# Patient Record
Sex: FEMALE | Race: WHITE | Employment: OTHER | URBAN - METROPOLITAN AREA
[De-identification: names, ages, dates, MRNs, and addresses within clinical notes are randomized per-mention and may not be internally consistent; named-entity substitution may affect disease eponyms.]

---

## 2017-01-20 RX ORDER — METAXALONE 800 MG/1
TABLET ORAL
Qty: 180 TABLET | Refills: 1 | Status: SHIPPED | OUTPATIENT
Start: 2017-01-20 | End: 2017-08-24

## 2017-01-20 NOTE — TELEPHONE ENCOUNTER
Approve/Deny. Last OV LE ANA LILIA 7-18-16 with request for 1 year follow up with refill given for 6 months.

## 2017-03-23 PROCEDURE — 87624 HPV HI-RISK TYP POOLED RSLT: CPT | Performed by: OBSTETRICS & GYNECOLOGY

## 2017-03-23 PROCEDURE — 88175 CYTOPATH C/V AUTO FLUID REDO: CPT | Performed by: OBSTETRICS & GYNECOLOGY

## 2017-08-24 ENCOUNTER — OFFICE VISIT (OUTPATIENT)
Dept: FAMILY MEDICINE CLINIC | Facility: CLINIC | Age: 57
End: 2017-08-24

## 2017-08-24 VITALS
RESPIRATION RATE: 20 BRPM | SYSTOLIC BLOOD PRESSURE: 120 MMHG | TEMPERATURE: 100 F | DIASTOLIC BLOOD PRESSURE: 60 MMHG | HEIGHT: 66.5 IN | WEIGHT: 187 LBS | HEART RATE: 76 BPM | BODY MASS INDEX: 29.7 KG/M2

## 2017-08-24 DIAGNOSIS — M54.9 CHRONIC BACK PAIN, UNSPECIFIED BACK LOCATION, UNSPECIFIED BACK PAIN LATERALITY: ICD-10-CM

## 2017-08-24 DIAGNOSIS — G89.29 CHRONIC BACK PAIN, UNSPECIFIED BACK LOCATION, UNSPECIFIED BACK PAIN LATERALITY: ICD-10-CM

## 2017-08-24 DIAGNOSIS — R07.89 ATYPICAL CHEST PAIN: ICD-10-CM

## 2017-08-24 DIAGNOSIS — Z82.49 FAMILY HISTORY OF EARLY CAD: ICD-10-CM

## 2017-08-24 DIAGNOSIS — Z13.820 SCREENING FOR OSTEOPOROSIS: ICD-10-CM

## 2017-08-24 DIAGNOSIS — E04.1 THYROID NODULE: ICD-10-CM

## 2017-08-24 DIAGNOSIS — R94.39 ABNORMAL STRESS ECHO: ICD-10-CM

## 2017-08-24 DIAGNOSIS — Z00.00 ANNUAL PHYSICAL EXAM: Primary | ICD-10-CM

## 2017-08-24 PROCEDURE — 99396 PREV VISIT EST AGE 40-64: CPT | Performed by: FAMILY MEDICINE

## 2017-08-24 RX ORDER — PREDNISONE 10 MG/1
TABLET ORAL
COMMUNITY
Start: 2017-07-31 | End: 2017-11-28

## 2017-08-24 RX ORDER — METAXALONE 800 MG/1
TABLET ORAL
Qty: 180 TABLET | Refills: 1 | Status: SHIPPED | OUTPATIENT
Start: 2017-08-24 | End: 2018-09-27

## 2017-08-24 NOTE — PROGRESS NOTES
HPI:   Jermaine Ko is a 60year old female who presents for a complete physical exam.     Wt Readings from Last 6 Encounters:  08/24/17 : 187 lb  03/23/17 : 186 lb  12/30/16 : 175 lb  07/18/16 : 180 lb  03/21/16 : 184 lb  03/10/16 : 187 lb 8 oz will per day     Pt reports she has had some issues with her heart skipping a beat and she has heard a swish  Pt reports early family history of CAD.         Current Outpatient Prescriptions:  Estradiol 0.0375 MG/24HR Transdermal Patch Biweekly Place 1 [facility] 8/15/2005   • Allergic rhinitis    • AR (allergic rhinitis) 8/15/2005   • Asthma    • Diverticulosis 8/15/2008   • Lactose intolerance 8/15/2008   • Migraine headache 8/15/1973   • MVP (mitral valve prolapse) 8/15/2005   • Spinal stenosis     CERVICAL MRI Cousin Female       Social History:   Smoking status: Never Smoker                                                              Smokeless tobacco: Never Used                      Alcohol use: No              Occ: . : .  Children: 4 - 2 sets of twins Future  - CBC WITH DIFFERENTIAL WITH PLATELET; Future  - VITAMIN B12; Future  - VITAMIN D, 25-HYDROXY; Future  - COMP METABOLIC PANEL (14); Future  - THYROID PEROXIDASE AND THYROGLOBULIN ANTIBODIES; Future    2.  Screening for osteoporosis    - XR DEXA BONE

## 2017-11-04 ENCOUNTER — LAB ENCOUNTER (OUTPATIENT)
Dept: LAB | Facility: HOSPITAL | Age: 57
End: 2017-11-04
Attending: FAMILY MEDICINE
Payer: COMMERCIAL

## 2017-11-04 DIAGNOSIS — Z00.00 ANNUAL PHYSICAL EXAM: ICD-10-CM

## 2017-11-04 DIAGNOSIS — E04.1 THYROID NODULE: ICD-10-CM

## 2017-11-04 DIAGNOSIS — R73.09 ELEVATED GLUCOSE: ICD-10-CM

## 2017-11-04 PROCEDURE — 86800 THYROGLOBULIN ANTIBODY: CPT

## 2017-11-04 PROCEDURE — 83036 HEMOGLOBIN GLYCOSYLATED A1C: CPT

## 2017-11-04 PROCEDURE — 82306 VITAMIN D 25 HYDROXY: CPT

## 2017-11-04 PROCEDURE — 82607 VITAMIN B-12: CPT

## 2017-11-04 PROCEDURE — 84439 ASSAY OF FREE THYROXINE: CPT

## 2017-11-04 PROCEDURE — 36415 COLL VENOUS BLD VENIPUNCTURE: CPT

## 2017-11-04 PROCEDURE — 84443 ASSAY THYROID STIM HORMONE: CPT

## 2017-11-04 PROCEDURE — 86376 MICROSOMAL ANTIBODY EACH: CPT

## 2017-11-04 PROCEDURE — 80061 LIPID PANEL: CPT

## 2017-11-04 PROCEDURE — 84481 FREE ASSAY (FT-3): CPT

## 2017-11-04 PROCEDURE — 80053 COMPREHEN METABOLIC PANEL: CPT

## 2017-11-04 PROCEDURE — 85025 COMPLETE CBC W/AUTO DIFF WBC: CPT

## 2017-11-06 ENCOUNTER — PATIENT MESSAGE (OUTPATIENT)
Dept: FAMILY MEDICINE CLINIC | Facility: CLINIC | Age: 57
End: 2017-11-06

## 2017-11-06 DIAGNOSIS — R73.09 ELEVATED GLUCOSE: Primary | ICD-10-CM

## 2017-11-06 NOTE — TELEPHONE ENCOUNTER
From: Domenico Lyons  To: Rolan Campos DO  Sent: 11/6/2017 2:13 PM CST  Subject: Test Results Question    Should I be concerned with any of my abnormal test results, such especially my RDW-CD and Monocyte counts?

## 2017-11-15 ENCOUNTER — HOSPITAL ENCOUNTER (OUTPATIENT)
Dept: CV DIAGNOSTICS | Facility: HOSPITAL | Age: 57
Discharge: HOME | End: 2017-11-15
Attending: FAMILY MEDICINE
Payer: COMMERCIAL

## 2017-11-15 DIAGNOSIS — Z82.49 FAMILY HISTORY OF EARLY CAD: ICD-10-CM

## 2017-11-15 DIAGNOSIS — R07.89 ATYPICAL CHEST PAIN: ICD-10-CM

## 2017-11-15 DIAGNOSIS — R94.39 ABNORMAL STRESS ECHO: ICD-10-CM

## 2017-11-15 PROCEDURE — 93017 CV STRESS TEST TRACING ONLY: CPT | Performed by: FAMILY MEDICINE

## 2017-11-15 PROCEDURE — 93350 STRESS TTE ONLY: CPT | Performed by: FAMILY MEDICINE

## 2017-11-15 PROCEDURE — 93018 CV STRESS TEST I&R ONLY: CPT | Performed by: FAMILY MEDICINE

## 2017-11-28 ENCOUNTER — OFFICE VISIT (OUTPATIENT)
Dept: FAMILY MEDICINE CLINIC | Facility: CLINIC | Age: 57
End: 2017-11-28

## 2017-11-28 VITALS
OXYGEN SATURATION: 98 % | WEIGHT: 188 LBS | SYSTOLIC BLOOD PRESSURE: 124 MMHG | DIASTOLIC BLOOD PRESSURE: 80 MMHG | HEART RATE: 85 BPM | BODY MASS INDEX: 30.22 KG/M2 | RESPIRATION RATE: 22 BRPM | HEIGHT: 66 IN | TEMPERATURE: 99 F

## 2017-11-28 DIAGNOSIS — R73.9 HYPERGLYCEMIA: Primary | ICD-10-CM

## 2017-11-28 PROCEDURE — 82962 GLUCOSE BLOOD TEST: CPT | Performed by: FAMILY MEDICINE

## 2017-11-28 PROCEDURE — 99213 OFFICE O/P EST LOW 20 MIN: CPT | Performed by: FAMILY MEDICINE

## 2017-11-28 RX ORDER — BUDESONIDE 0.5 MG/2ML
INHALANT ORAL AS NEEDED
COMMUNITY
Start: 2017-10-21 | End: 2018-09-27 | Stop reason: ALTCHOICE

## 2017-11-28 RX ORDER — EPINEPHRINE 0.3 MG/.3ML
INJECTION SUBCUTANEOUS AS NEEDED
COMMUNITY
Start: 2017-11-12 | End: 2019-10-11 | Stop reason: ALTCHOICE

## 2017-11-28 RX ORDER — LEVALBUTEROL INHALATION SOLUTION 0.63 MG/3ML
SOLUTION RESPIRATORY (INHALATION) AS NEEDED
COMMUNITY
Start: 2017-11-17

## 2017-11-28 NOTE — PROGRESS NOTES
HPI:   Dyan Gaston is a 59year old female here for lab follow up     Glucose and cholesterol elevated. Pt has not been doing great with exercise. Diet is ok   Working too much around the holidays.      Discussed stress echo  Pt reports she has an Disp:  Rfl:    VITAMIN D OR 5000 IU Daily Disp:  Rfl:    MSM  MG daily Disp:  Rfl:    MULTI-VITAMIN OR 1 tab daily Disp:  Rfl:    VITAMIN C 500 MG OR TABS 1 TABLET DAILY Disp:  Rfl:    SELENIUM 200 MCG OR TABS 2 qd Disp:  Rfl:       Past Medical Hist Zollie Ports Son    • Psychiatric Son      ADD   • Other Zollie Ports Son    • Obesity Sister    • Hypertension Sister    • Other [OTHER] Sister    • [address] Sister    • Psychiatric Brother      ADHD    • Breast Cancer Cousin 28     P 1st cousin- dx age 28 the plan. Follow up in 9 months  or sooner if needed.

## 2018-04-11 PROBLEM — S83.231A COMPLEX TEAR OF MEDIAL MENISCUS OF RIGHT KNEE AS CURRENT INJURY, INITIAL ENCOUNTER: Status: ACTIVE | Noted: 2018-04-11

## 2018-04-25 ENCOUNTER — HOSPITAL ENCOUNTER (OUTPATIENT)
Dept: MRI IMAGING | Age: 58
Discharge: HOME | End: 2018-04-25
Attending: ORTHOPAEDIC SURGERY
Payer: COMMERCIAL

## 2018-04-25 DIAGNOSIS — G89.29 CHRONIC PAIN OF RIGHT KNEE: ICD-10-CM

## 2018-04-25 DIAGNOSIS — S83.231A COMPLEX TEAR OF MEDIAL MENISCUS OF RIGHT KNEE AS CURRENT INJURY, INITIAL ENCOUNTER: ICD-10-CM

## 2018-04-25 DIAGNOSIS — M25.561 CHRONIC PAIN OF RIGHT KNEE: ICD-10-CM

## 2018-04-25 PROCEDURE — 73721 MRI JNT OF LWR EXTRE W/O DYE: CPT | Performed by: ORTHOPAEDIC SURGERY

## 2018-05-09 PROBLEM — M17.11 PRIMARY OSTEOARTHRITIS OF RIGHT KNEE: Status: ACTIVE | Noted: 2018-05-09

## 2018-05-09 PROBLEM — S83.231A COMPLEX TEAR OF MEDIAL MENISCUS OF RIGHT KNEE AS CURRENT INJURY, INITIAL ENCOUNTER: Status: RESOLVED | Noted: 2018-04-11 | Resolved: 2018-05-09

## 2018-05-09 PROCEDURE — 88175 CYTOPATH C/V AUTO FLUID REDO: CPT | Performed by: OBSTETRICS & GYNECOLOGY

## 2018-07-20 PROCEDURE — 88305 TISSUE EXAM BY PATHOLOGIST: CPT | Performed by: OBSTETRICS & GYNECOLOGY

## 2018-09-27 ENCOUNTER — LABORATORY ENCOUNTER (OUTPATIENT)
Dept: LAB | Age: 58
End: 2018-09-27
Attending: FAMILY MEDICINE
Payer: COMMERCIAL

## 2018-09-27 ENCOUNTER — OFFICE VISIT (OUTPATIENT)
Dept: FAMILY MEDICINE CLINIC | Facility: CLINIC | Age: 58
End: 2018-09-27
Payer: COMMERCIAL

## 2018-09-27 VITALS
WEIGHT: 186 LBS | BODY MASS INDEX: 29.89 KG/M2 | DIASTOLIC BLOOD PRESSURE: 72 MMHG | RESPIRATION RATE: 16 BRPM | OXYGEN SATURATION: 99 % | TEMPERATURE: 98 F | HEIGHT: 66 IN | HEART RATE: 84 BPM | SYSTOLIC BLOOD PRESSURE: 120 MMHG

## 2018-09-27 DIAGNOSIS — M51.26 BULGING OF LUMBAR INTERVERTEBRAL DISC: ICD-10-CM

## 2018-09-27 DIAGNOSIS — Z00.00 ANNUAL PHYSICAL EXAM: ICD-10-CM

## 2018-09-27 DIAGNOSIS — R73.9 HYPERGLYCEMIA: ICD-10-CM

## 2018-09-27 DIAGNOSIS — Z12.11 COLON CANCER SCREENING: ICD-10-CM

## 2018-09-27 DIAGNOSIS — M50.20 BULGING OF CERVICAL INTERVERTEBRAL DISC: ICD-10-CM

## 2018-09-27 DIAGNOSIS — Z00.00 ANNUAL PHYSICAL EXAM: Primary | ICD-10-CM

## 2018-09-27 DIAGNOSIS — M51.24 BULGING OF THORACIC INTERVERTEBRAL DISC: ICD-10-CM

## 2018-09-27 DIAGNOSIS — M54.9 CHRONIC BACK PAIN, UNSPECIFIED BACK LOCATION, UNSPECIFIED BACK PAIN LATERALITY: ICD-10-CM

## 2018-09-27 DIAGNOSIS — R30.0 DYSURIA: ICD-10-CM

## 2018-09-27 DIAGNOSIS — Z00.00 ROUTINE GENERAL MEDICAL EXAMINATION AT A HEALTH CARE FACILITY: ICD-10-CM

## 2018-09-27 DIAGNOSIS — G89.29 CHRONIC BACK PAIN, UNSPECIFIED BACK LOCATION, UNSPECIFIED BACK PAIN LATERALITY: ICD-10-CM

## 2018-09-27 LAB
ALBUMIN SERPL-MCNC: 3.9 G/DL (ref 3.5–4.8)
ALBUMIN/GLOB SERPL: 1 {RATIO} (ref 1–2)
ALP LIVER SERPL-CCNC: 62 U/L (ref 46–118)
ALT SERPL-CCNC: 32 U/L (ref 14–54)
ANION GAP SERPL CALC-SCNC: 6 MMOL/L (ref 0–18)
AST SERPL-CCNC: 18 U/L (ref 15–41)
BASOPHILS # BLD AUTO: 0.03 X10(3) UL (ref 0–0.1)
BASOPHILS NFR BLD AUTO: 0.5 %
BILIRUB SERPL-MCNC: 0.5 MG/DL (ref 0.1–2)
BUN BLD-MCNC: 16 MG/DL (ref 8–20)
BUN/CREAT SERPL: 18.6 (ref 10–20)
CALCIUM BLD-MCNC: 8.7 MG/DL (ref 8.3–10.3)
CHLORIDE SERPL-SCNC: 108 MMOL/L (ref 101–111)
CHOLEST SMN-MCNC: 248 MG/DL (ref ?–200)
CO2 SERPL-SCNC: 27 MMOL/L (ref 22–32)
CREAT BLD-MCNC: 0.86 MG/DL (ref 0.55–1.02)
EOSINOPHIL # BLD AUTO: 0.07 X10(3) UL (ref 0–0.3)
EOSINOPHIL NFR BLD AUTO: 1.2 %
ERYTHROCYTE [DISTWIDTH] IN BLOOD BY AUTOMATED COUNT: 13.9 % (ref 11.5–16)
EST. AVERAGE GLUCOSE BLD GHB EST-MCNC: 120 MG/DL (ref 68–126)
GLOBULIN PLAS-MCNC: 4 G/DL (ref 2.5–4)
GLUCOSE BLD-MCNC: 110 MG/DL (ref 70–99)
HAV AB SERPL IA-ACNC: 1027 PG/ML (ref 193–986)
HBA1C MFR BLD HPLC: 5.8 % (ref ?–5.7)
HCT VFR BLD AUTO: 44.8 % (ref 34–50)
HDLC SERPL-MCNC: 50 MG/DL (ref 40–59)
HGB BLD-MCNC: 14.5 G/DL (ref 12–16)
IMMATURE GRANULOCYTE COUNT: 0.01 X10(3) UL (ref 0–1)
IMMATURE GRANULOCYTE RATIO %: 0.2 %
LDLC SERPL CALC-MCNC: 171 MG/DL (ref ?–100)
LYMPHOCYTES # BLD AUTO: 2.1 X10(3) UL (ref 0.9–4)
LYMPHOCYTES NFR BLD AUTO: 35 %
M PROTEIN MFR SERPL ELPH: 7.9 G/DL (ref 6.1–8.3)
MCH RBC QN AUTO: 30 PG (ref 27–33.2)
MCHC RBC AUTO-ENTMCNC: 32.4 G/DL (ref 31–37)
MCV RBC AUTO: 92.8 FL (ref 81–100)
MONOCYTES # BLD AUTO: 0.6 X10(3) UL (ref 0.1–1)
MONOCYTES NFR BLD AUTO: 10 %
NEUTROPHIL ABS PRELIM: 3.19 X10 (3) UL (ref 1.3–6.7)
NEUTROPHILS # BLD AUTO: 3.19 X10(3) UL (ref 1.3–6.7)
NEUTROPHILS NFR BLD AUTO: 53.1 %
NONHDLC SERPL-MCNC: 198 MG/DL (ref ?–130)
OSMOLALITY SERPL CALC.SUM OF ELEC: 294 MOSM/KG (ref 275–295)
PLATELET # BLD AUTO: 302 10(3)UL (ref 150–450)
POTASSIUM SERPL-SCNC: 4.2 MMOL/L (ref 3.6–5.1)
RBC # BLD AUTO: 4.83 X10(6)UL (ref 3.8–5.1)
RED CELL DISTRIBUTION WIDTH-SD: 47.7 FL (ref 35.1–46.3)
SODIUM SERPL-SCNC: 141 MMOL/L (ref 136–144)
T3FREE SERPL-MCNC: 2.49 PG/ML (ref 2.3–4.2)
T4 FREE SERPL-MCNC: 1 NG/DL (ref 0.9–1.8)
THYROGLOB SERPL-MCNC: 36 U/ML (ref ?–60)
THYROPEROXIDASE AB SERPL-ACNC: <28 U/ML (ref ?–60)
TRIGL SERPL-MCNC: 133 MG/DL (ref 30–149)
TSI SER-ACNC: 1.31 MIU/ML (ref 0.35–5.5)
VIT D+METAB SERPL-MCNC: 55.5 NG/ML (ref 30–100)
VLDLC SERPL CALC-MCNC: 27 MG/DL (ref 0–30)
WBC # BLD AUTO: 6 X10(3) UL (ref 4–13)

## 2018-09-27 PROCEDURE — 82306 VITAMIN D 25 HYDROXY: CPT

## 2018-09-27 PROCEDURE — 86376 MICROSOMAL ANTIBODY EACH: CPT

## 2018-09-27 PROCEDURE — 84481 FREE ASSAY (FT-3): CPT

## 2018-09-27 PROCEDURE — 99396 PREV VISIT EST AGE 40-64: CPT | Performed by: FAMILY MEDICINE

## 2018-09-27 PROCEDURE — 36415 COLL VENOUS BLD VENIPUNCTURE: CPT

## 2018-09-27 PROCEDURE — 80053 COMPREHEN METABOLIC PANEL: CPT

## 2018-09-27 PROCEDURE — 80061 LIPID PANEL: CPT

## 2018-09-27 PROCEDURE — 82607 VITAMIN B-12: CPT

## 2018-09-27 PROCEDURE — 85025 COMPLETE CBC W/AUTO DIFF WBC: CPT

## 2018-09-27 PROCEDURE — 83036 HEMOGLOBIN GLYCOSYLATED A1C: CPT

## 2018-09-27 PROCEDURE — 84443 ASSAY THYROID STIM HORMONE: CPT

## 2018-09-27 PROCEDURE — 84439 ASSAY OF FREE THYROXINE: CPT

## 2018-09-27 PROCEDURE — 87086 URINE CULTURE/COLONY COUNT: CPT

## 2018-09-27 PROCEDURE — 86800 THYROGLOBULIN ANTIBODY: CPT

## 2018-09-27 RX ORDER — METAXALONE 800 MG/1
TABLET ORAL
Qty: 180 TABLET | Refills: 3 | Status: SHIPPED | OUTPATIENT
Start: 2018-09-27 | End: 2020-10-03

## 2018-09-27 RX ORDER — METAXALONE 800 MG/1
TABLET ORAL
Qty: 180 TABLET | Refills: 1 | Status: SHIPPED | OUTPATIENT
Start: 2018-09-27 | End: 2018-09-27

## 2018-09-27 RX ORDER — PREDNISONE 10 MG/1
TABLET ORAL
Status: ON HOLD | COMMUNITY
Start: 2018-09-26 | End: 2019-01-17

## 2018-09-27 NOTE — PROGRESS NOTES
HPI:   Trinidad Perera is a 59year old female who presents for a complete physical exam.     Wt Readings from Last 6 Encounters:  09/27/18 : 186 lb  07/20/18 : 187 lb 12.8 oz  05/09/18 : 180 lb  04/11/18 : 180 lb  12/29/17 : 190 lb  11/28/17 : 188 lb Outpatient Medications:  predniSONE 10 MG Oral Tab  Disp:  Rfl:    Progesterone Micronized 100 MG Oral Cap Take 1 capsule (100 mg total) by mouth nightly.  Disp: 90 capsule Rfl: 4   Estradiol 0.05 MG/24HR Transdermal Patch Biweekly Place 1 patch onto the sk hyperactivity disorder) 8/15/2005   • Allergic rhinitis    • AR (allergic rhinitis) 8/15/2005   • Asthma    • Diverticulosis 8/15/2008   • Lactose intolerance 8/15/2008   • Migraine headache 8/15/1973   • MVP (mitral valve prolapse) 8/15/2005   • Primary o Wendy Brand) Sister    • Psychiatric Brother         ADHD    • Breast Cancer Cousin 28        P 1st cousin- dx age 28   • Breast Cancer Paternal Aunt         dx age- 59   • Ovarian Cancer Paternal Cousin Female       Social History:   Social History    [place] 2+ DTR right leg 1+ left leg normal sensation   EXTREMITIES: no cyanosis, clubbing or edema  NEURO: cranial nerves are intact,motor and sensory are grossly intact    ASSESSMENT AND PLAN:   Zoë Early is a 59year old female who presents for annual

## 2018-12-06 ENCOUNTER — PATIENT MESSAGE (OUTPATIENT)
Dept: FAMILY MEDICINE CLINIC | Facility: CLINIC | Age: 58
End: 2018-12-06

## 2018-12-06 DIAGNOSIS — N28.1 KIDNEY CYSTS: Primary | ICD-10-CM

## 2018-12-06 NOTE — TELEPHONE ENCOUNTER
From: Shonda Maciel  To: Joivta Adames DO  Sent: 12/6/2018 3:58 PM CST  Subject: Test Results Question    Hello. I had 3 MRI's at [facility] for [facility] ([phone]) on Monday, December 3rd.  Has Dr. Marla Romero had a chance to review the

## 2018-12-07 NOTE — TELEPHONE ENCOUNTER
Regarding: RE: Test Results Question  Contact: [phone]  ----- Message from Generic, Mychart sent at 12/7/2018  8:50 AM CST -----    Thank you for your response. Please fax the reports to [phone], as that is my private, confidential fax.   In selena

## 2018-12-08 ENCOUNTER — HOSPITAL ENCOUNTER (OUTPATIENT)
Dept: ULTRASOUND IMAGING | Age: 58
Discharge: HOME | End: 2018-12-08
Attending: FAMILY MEDICINE
Payer: COMMERCIAL

## 2018-12-08 DIAGNOSIS — N28.1 KIDNEY CYSTS: ICD-10-CM

## 2018-12-08 PROCEDURE — 76775 US EXAM ABDO BACK WALL LIM: CPT | Performed by: FAMILY MEDICINE

## 2018-12-10 DIAGNOSIS — N28.1 BILATERAL RENAL CYSTS: Primary | ICD-10-CM

## 2019-01-16 ENCOUNTER — HOSPITAL ENCOUNTER (OUTPATIENT)
Facility: HOSPITAL | Age: 59
Setting detail: OBSERVATION
Discharge: HOME | End: 2019-01-17
Attending: STUDENT IN AN ORGANIZED HEALTH CARE EDUCATION/TRAINING PROGRAM | Admitting: HOSPITALIST
Payer: COMMERCIAL

## 2019-01-16 DIAGNOSIS — R07.9 ACUTE CHEST PAIN: Primary | ICD-10-CM

## 2019-01-16 DIAGNOSIS — I10 HYPERTENSION, UNSPECIFIED TYPE: ICD-10-CM

## 2019-01-17 ENCOUNTER — APPOINTMENT (OUTPATIENT)
Dept: GENERAL RADIOLOGY | Facility: HOSPITAL | Age: 59
End: 2019-01-17
Attending: STUDENT IN AN ORGANIZED HEALTH CARE EDUCATION/TRAINING PROGRAM
Payer: COMMERCIAL

## 2019-01-17 ENCOUNTER — APPOINTMENT (OUTPATIENT)
Dept: CV DIAGNOSTICS | Facility: HOSPITAL | Age: 59
End: 2019-01-17
Attending: INTERNAL MEDICINE
Payer: COMMERCIAL

## 2019-01-17 VITALS
SYSTOLIC BLOOD PRESSURE: 159 MMHG | RESPIRATION RATE: 18 BRPM | BODY MASS INDEX: 30.86 KG/M2 | TEMPERATURE: 98 F | HEIGHT: 66 IN | DIASTOLIC BLOOD PRESSURE: 92 MMHG | HEART RATE: 77 BPM | WEIGHT: 192 LBS | OXYGEN SATURATION: 96 %

## 2019-01-17 PROBLEM — I10 HYPERTENSION, UNSPECIFIED TYPE: Status: ACTIVE | Noted: 2019-01-17

## 2019-01-17 PROBLEM — R07.9 ACUTE CHEST PAIN: Status: ACTIVE | Noted: 2019-01-17

## 2019-01-17 LAB
ALBUMIN SERPL-MCNC: 3.9 G/DL (ref 3.1–4.5)
ALBUMIN/GLOB SERPL: 1 {RATIO} (ref 1–2)
ALP LIVER SERPL-CCNC: 66 U/L (ref 46–118)
ALT SERPL-CCNC: 31 U/L (ref 14–54)
ANION GAP SERPL CALC-SCNC: 8 MMOL/L (ref 0–18)
APTT PPP: 34.6 SECONDS (ref 26.1–34.6)
AST SERPL-CCNC: 22 U/L (ref 15–41)
ATRIAL RATE: 97 BPM
BASOPHILS # BLD AUTO: 0.04 X10(3) UL (ref 0–0.1)
BASOPHILS NFR BLD AUTO: 0.5 %
BILIRUB SERPL-MCNC: 0.2 MG/DL (ref 0.1–2)
BUN BLD-MCNC: 23 MG/DL (ref 8–20)
BUN/CREAT SERPL: 25.8 (ref 10–20)
CALCIUM BLD-MCNC: 9.1 MG/DL (ref 8.3–10.3)
CHLORIDE SERPL-SCNC: 107 MMOL/L (ref 101–111)
CHOLEST SMN-MCNC: 193 MG/DL (ref ?–200)
CO2 SERPL-SCNC: 26 MMOL/L (ref 22–32)
CREAT BLD-MCNC: 0.89 MG/DL (ref 0.55–1.02)
D-DIMER: 0.45 UG/ML FEU (ref 0–0.49)
EOSINOPHIL # BLD AUTO: 0.08 X10(3) UL (ref 0–0.3)
EOSINOPHIL NFR BLD AUTO: 1 %
ERYTHROCYTE [DISTWIDTH] IN BLOOD BY AUTOMATED COUNT: 14 % (ref 11.5–16)
EST. AVERAGE GLUCOSE BLD GHB EST-MCNC: 120 MG/DL (ref 68–126)
GLOBULIN PLAS-MCNC: 3.8 G/DL (ref 2.8–4.4)
GLUCOSE BLD-MCNC: 117 MG/DL (ref 70–99)
HBA1C MFR BLD HPLC: 5.8 % (ref ?–5.7)
HCT VFR BLD AUTO: 41.2 % (ref 34–50)
HDLC SERPL-MCNC: 50 MG/DL (ref 40–59)
HGB BLD-MCNC: 13.5 G/DL (ref 12–16)
IMMATURE GRANULOCYTE COUNT: 0.02 X10(3) UL (ref 0–1)
IMMATURE GRANULOCYTE RATIO %: 0.3 %
INR BLD: 0.95 (ref 0.9–1.1)
LDLC SERPL CALC-MCNC: 128 MG/DL (ref ?–100)
LYMPHOCYTES # BLD AUTO: 3.64 X10(3) UL (ref 0.9–4)
LYMPHOCYTES NFR BLD AUTO: 47.2 %
M PROTEIN MFR SERPL ELPH: 7.7 G/DL (ref 6.4–8.2)
MCH RBC QN AUTO: 29.5 PG (ref 27–33.2)
MCHC RBC AUTO-ENTMCNC: 32.8 G/DL (ref 31–37)
MCV RBC AUTO: 90 FL (ref 81–100)
MONOCYTES # BLD AUTO: 0.79 X10(3) UL (ref 0.1–1)
MONOCYTES NFR BLD AUTO: 10.2 %
NEUTROPHIL ABS PRELIM: 3.14 X10 (3) UL (ref 1.3–6.7)
NEUTROPHILS # BLD AUTO: 3.14 X10(3) UL (ref 1.3–6.7)
NEUTROPHILS NFR BLD AUTO: 40.8 %
NONHDLC SERPL-MCNC: 143 MG/DL (ref ?–130)
OSMOLALITY SERPL CALC.SUM OF ELEC: 297 MOSM/KG (ref 275–295)
P AXIS: 22 DEGREES
P-R INTERVAL: 170 MS
PLATELET # BLD AUTO: 292 10(3)UL (ref 150–450)
POTASSIUM SERPL-SCNC: 3.7 MMOL/L (ref 3.6–5.1)
PRO-BETA NATRIURETIC PEPTIDE: 53 PG/ML (ref ?–125)
PSA SERPL DL<=0.01 NG/ML-MCNC: 13.1 SECONDS (ref 12.4–14.7)
Q-T INTERVAL: 358 MS
QRS DURATION: 98 MS
QTC CALCULATION (BEZET): 454 MS
R AXIS: -4 DEGREES
RBC # BLD AUTO: 4.58 X10(6)UL (ref 3.8–5.1)
RED CELL DISTRIBUTION WIDTH-SD: 46.2 FL (ref 35.1–46.3)
SODIUM SERPL-SCNC: 141 MMOL/L (ref 136–144)
T AXIS: 17 DEGREES
TRIGL SERPL-MCNC: 77 MG/DL (ref 30–149)
TROPONIN I SERPL-MCNC: <0.046 NG/ML (ref ?–0.05)
VENTRICULAR RATE: 97 BPM
VLDLC SERPL CALC-MCNC: 15 MG/DL (ref 0–30)
WBC # BLD AUTO: 7.7 X10(3) UL (ref 4–13)

## 2019-01-17 PROCEDURE — 71045 X-RAY EXAM CHEST 1 VIEW: CPT | Performed by: STUDENT IN AN ORGANIZED HEALTH CARE EDUCATION/TRAINING PROGRAM

## 2019-01-17 PROCEDURE — 93017 CV STRESS TEST TRACING ONLY: CPT | Performed by: INTERNAL MEDICINE

## 2019-01-17 PROCEDURE — 99220 INITIAL OBSERVATION CARE,LEVL III: CPT | Performed by: HOSPITALIST

## 2019-01-17 PROCEDURE — 78452 HT MUSCLE IMAGE SPECT MULT: CPT | Performed by: INTERNAL MEDICINE

## 2019-01-17 PROCEDURE — 93018 CV STRESS TEST I&R ONLY: CPT | Performed by: INTERNAL MEDICINE

## 2019-01-17 RX ORDER — AZELASTINE HYDROCHLORIDE, FLUTICASONE PROPIONATE 137; 50 UG/1; UG/1
SPRAY, METERED NASAL DAILY
Status: DISCONTINUED | OUTPATIENT
Start: 2019-01-17 | End: 2019-01-17 | Stop reason: SDUPTHER

## 2019-01-17 RX ORDER — METHYLPHENIDATE HYDROCHLORIDE 5 MG/1
5 TABLET ORAL 2 TIMES DAILY
Status: DISCONTINUED | OUTPATIENT
Start: 2019-01-17 | End: 2019-01-17

## 2019-01-17 RX ORDER — NITROGLYCERIN 0.4 MG/1
0.4 TABLET SUBLINGUAL ONCE
Status: COMPLETED | OUTPATIENT
Start: 2019-01-17 | End: 2019-01-17

## 2019-01-17 RX ORDER — METHYLPHENIDATE HYDROCHLORIDE 10 MG/1
10 TABLET ORAL 2 TIMES DAILY
Status: DISCONTINUED | OUTPATIENT
Start: 2019-01-17 | End: 2019-01-17

## 2019-01-17 RX ORDER — LISINOPRIL 10 MG/1
10 TABLET ORAL DAILY
Qty: 30 TABLET | Refills: 0 | Status: SHIPPED | OUTPATIENT
Start: 2019-01-17 | End: 2019-02-01

## 2019-01-17 RX ORDER — CYCLOBENZAPRINE HCL 5 MG
5 TABLET ORAL 3 TIMES DAILY PRN
Status: DISCONTINUED | OUTPATIENT
Start: 2019-01-17 | End: 2019-01-17

## 2019-01-17 RX ORDER — LISINOPRIL 10 MG/1
10 TABLET ORAL DAILY
Status: DISCONTINUED | OUTPATIENT
Start: 2019-01-17 | End: 2019-01-17

## 2019-01-17 RX ORDER — FLUTICASONE PROPIONATE 50 MCG
1 SPRAY, SUSPENSION (ML) NASAL DAILY
Status: DISCONTINUED | OUTPATIENT
Start: 2019-01-17 | End: 2019-01-17

## 2019-01-17 RX ORDER — POTASSIUM CHLORIDE 20 MEQ/1
40 TABLET, EXTENDED RELEASE ORAL ONCE
Status: COMPLETED | OUTPATIENT
Start: 2019-01-17 | End: 2019-01-17

## 2019-01-17 RX ORDER — SODIUM CHLORIDE 9 MG/ML
INJECTION, SOLUTION INTRAVENOUS CONTINUOUS
Status: DISCONTINUED | OUTPATIENT
Start: 2019-01-17 | End: 2019-01-17

## 2019-01-17 RX ORDER — ACETAMINOPHEN 500 MG
1000 TABLET ORAL ONCE
Status: COMPLETED | OUTPATIENT
Start: 2019-01-17 | End: 2019-01-17

## 2019-01-17 RX ORDER — LEVOTHYROXINE SODIUM 0.07 MG/1
75 TABLET ORAL
Status: DISCONTINUED | OUTPATIENT
Start: 2019-01-17 | End: 2019-01-17

## 2019-01-17 RX ORDER — ASPIRIN 325 MG
325 TABLET ORAL DAILY
Status: DISCONTINUED | OUTPATIENT
Start: 2019-01-17 | End: 2019-01-17

## 2019-01-17 RX ORDER — ALBUTEROL SULFATE 90 UG/1
2 AEROSOL, METERED RESPIRATORY (INHALATION) EVERY 4 HOURS PRN
Status: DISCONTINUED | OUTPATIENT
Start: 2019-01-17 | End: 2019-01-17

## 2019-01-17 RX ORDER — AZELASTINE 1 MG/ML
1 SPRAY, METERED NASAL DAILY
Status: DISCONTINUED | OUTPATIENT
Start: 2019-01-17 | End: 2019-01-17

## 2019-01-17 RX ORDER — ASPIRIN 81 MG/1
162 TABLET, CHEWABLE ORAL ONCE
Status: COMPLETED | OUTPATIENT
Start: 2019-01-17 | End: 2019-01-17

## 2019-01-17 RX ORDER — NITROGLYCERIN 0.4 MG/1
0.4 TABLET SUBLINGUAL EVERY 5 MIN PRN
Status: DISCONTINUED | OUTPATIENT
Start: 2019-01-17 | End: 2019-01-17

## 2019-01-17 NOTE — ED INITIAL ASSESSMENT (HPI)
Pt with intermittent chest pain aprox 45 min prior. Pt with pain to right arm and into right shoulder blade.

## 2019-01-17 NOTE — PROGRESS NOTES
Tele & one iv site removed (catheter inatact). Pt is agreeable to d/c home w/lisinopril Rx for elevated b/p; however, she stated that she will only take it tomorrow if her b/p is still elevated.   Discharge paperwork, follow-up appointments needed, dischar

## 2019-01-17 NOTE — PLAN OF CARE
NURSING ADMISSION NOTE    Assumed care of patient on CTU8 @ 0130. Patient is ANOx4, on RA, tele NSR/ST, cont B/B, UAL. Patient came to ED overnight due to dull chest pain rated 2/10 with radiation to L Arm, L Shoulder, and L side of Jaw.  Patient drove Patient/Family Goals    • Patient/Family Long Term Goal Progressing    • Patient/Family Short Term Goal Progressing

## 2019-01-17 NOTE — PROGRESS NOTES
CARDIODIAGNOTIC PRELIMINARY REPORT:      REN protocol completed for 9:15 minutes with EKG changes noted; no arrhythmias noted     Denied cardiac symptoms     Base:  134/84, HR: 88;  Peak: 184/86, HR: 141 (88% APMHR)    Second set of images pending

## 2019-01-17 NOTE — H&P
[facility] HOSPITAL[facility]  History and Physical     Matt Whitley Patient Status:  Emergency    10/13/1960 MRN [id number]   Location [facility] Attending Marialuisa Garcia MD   Hosp Day # 0 PCP Tricia Goldstein DO     Chief Compla hemorrhoids   • COLONOSCOPY  2009   • HYSTEROSCOPY  07/20/2018    S&N, R/S of Endometrial Polyp   • OTHER SURGICAL HISTORY  2010    BMT   • OTHER SURGICAL HISTORY  1993    ECTOPIC  PREGNANCY REMOVAL   • OTHER SURGICAL HISTORY  2010    ENDOSCOPIC  SINUS   • Medications on File Prior to Encounter:  Metaxalone 800 MG Oral Tab TAKE ONE TABLET TWO TIMES A DAY AS NEEDED Disp: 180 tablet Rfl: 3   Progesterone Micronized 100 MG Oral Cap Take 1 capsule (100 mg total) by mouth nightly.  Disp: 90 capsule Rfl: 4   Estrad systems was completed. Pertinent positives and negatives noted in the HPI.     Physical Exam:    BP (!) 156/93   Pulse 85   Temp 97.3 °F (36.3 °C) (Temporal)   Resp 13   Ht 167.6 cm (5' 6\")   Wt 180 lb (81.6 kg)   LMP 10/13/2007   SpO2 94%   BMI 29.05 k evaluation in the morning  2.hx of ADHD    Quality:  · DVT Prophylaxis: scd  · CODE status: full  · Gallegos: no    Plan of care discussed with patient and ED physician    Tierney Doherty MD  1/17/2019

## 2019-01-17 NOTE — ED PROVIDER NOTES
Patient Seen in: [facility] Emergency Department    History   Patient presents with:  Chest Pain Angina (cardiovascular)    Stated Complaint: chest pain    HPI    Patient is a 25-year-old female with previous history of mitral valve prolapse and bor Alcohol use: Yes      Alcohol/week: 0.0 oz      Comment: Occ glass of wine 1x/mo    Drug use: No      Comment: MARIJUANA 30 YRS. AGO W/O ABUSE/XS. Review of Systems    Positive for stated complaint: chest pain  Other systems are as noted in HPI.   Cons Final result                 Please view results for these tests on the individual orders.    COMP METABOLIC PANEL (14)   TROPONIN I   D-DIMER   PRO BETA NATRIURETIC PEPTIDE   PROTHROMBIN TIME (PT)   PTT, ACTIVATED   RAINBOW DRAW B

## 2019-01-17 NOTE — CONSULTS
[facility] Cardiology Consultation    Heatherana Núñez Patient Status:  Observation    10/13/1960 MRN [id number]   [facility] 8NE-A Attending Gisela Eisenberg MD   Hosp Day # 0 PCP Yun Sam DO     Reason for Consultation:  Chest pain Age of Onset   • Cancer Mother         NHL and pancreatic cancer   • Hypertension Mother    • Obesity Mother    • Other (Other) Mother    • Cancer Other         colon cancer in maternal aunt and maternal 1st cousin   • Heart Disorder Father         1)ANA (84.4 kg)  07/20/18 : 187 lb 12.8 oz (85.2 kg)       01/17/19  0143 01/17/19  0144 01/17/19  0145 01/17/19  0457   BP: 146/84 142/86 131/84 125/84   BP Location: Left arm Left arm Left arm Left arm   Pulse: 82 78 85 85   Resp: 18 18 18 16   Temp: 97.8 °F (

## 2019-01-17 NOTE — PLAN OF CARE
Comments: Pt is A&OX4, VSS on RA, and maintaining NSR on telemetry. Awaiting nuclear stress test results; if WNL, then pt will d/c home & f/u w/Dr. Marissa LUO.       Currently, denies any generalized/localized pain, chest pain, chest pressure, palpit replacement as ordered  - Monitor response to electrolyte replacements, including rhythm and repeat lab results as appropriate  - Fluid restriction as ordered  - Instruct patient on fluid and nutrition restrictions as appropriate  Outcome: Progressing    P

## 2019-01-18 ENCOUNTER — PATIENT OUTREACH (OUTPATIENT)
Dept: CASE MANAGEMENT | Age: 59
End: 2019-01-18

## 2019-01-18 DIAGNOSIS — Z02.9 ENCOUNTERS FOR UNSPECIFIED ADMINISTRATIVE PURPOSE: ICD-10-CM

## 2019-01-18 NOTE — DISCHARGE SUMMARY
[facility]OSPITAL[facility]  DISCHARGE SUMMARY     China Strange Patient Status:  Observation    10/13/1960 MRN [id number]   [facility] 8NE-A Attending No att. providers found   Hosp Day # 0 PCP Skyler Layton DO     Date of Admission:  Medications      START taking these medications      Instructions Prescription details   lisinopril 10 MG Tabs  Commonly known as:  ELMAILZESTRIL  Notes to patient:  Take your blood pressure at least twice per day.   Write down day/time for each [employer] Take 1,000 mg by mouth 2 (two) times daily. Refills:  0     Progesterone Micronized 100 MG Caps  Commonly known as:  PROMETRIUM      Take 1 capsule (100 mg total) by mouth nightly.    Quantity:  90 capsule  Refills:  4     PULMICORT FLEXHALER 180 MCG/A

## 2019-01-21 NOTE — PROGRESS NOTES
Initial Post Discharge Follow Up   Discharge Date: 1/17/19  Contact Date: 1/18/2019    Consent Verification:  Assessment Completed With: Patient  HIPAA Verified?   Yes    Discharge Dx:     Chest discomfort  Anxiety  Hx ADHD    General:   • How have you b Biweekly Place 1 patch onto the skin twice a week. Disp: 24 patch Rfl: 4   EPINEPHrine 0.3 MG/0.3ML Injection Solution Auto-injector as needed. Disp:  Rfl:    Levalbuterol HCl 0.63 MG/3ML Inhalation Nebu Soln as needed.    Disp:  Rfl:    Beta Glucan Does over your medications with you to make sure we are not missing anything? Pt taking medications as prescribed. Pt to reconcile medications at her [facility] HFU appt. • Are there any reasons that keep you from taking your medication as prescribed?  No  Are you hav [address] [facility]  [place] [address] [phone] [facility] TRISTAN rPieto at [address], [address]  [place] [address] [phone] [facility] - Donna Lund at [address]

## 2019-02-01 ENCOUNTER — OFFICE VISIT (OUTPATIENT)
Dept: FAMILY MEDICINE CLINIC | Facility: CLINIC | Age: 59
End: 2019-02-01
Payer: COMMERCIAL

## 2019-02-01 ENCOUNTER — LAB ENCOUNTER (OUTPATIENT)
Dept: LAB | Age: 59
End: 2019-02-01
Attending: FAMILY MEDICINE
Payer: COMMERCIAL

## 2019-02-01 VITALS
DIASTOLIC BLOOD PRESSURE: 64 MMHG | RESPIRATION RATE: 16 BRPM | HEART RATE: 87 BPM | BODY MASS INDEX: 29.89 KG/M2 | WEIGHT: 186 LBS | SYSTOLIC BLOOD PRESSURE: 120 MMHG | TEMPERATURE: 98 F | HEIGHT: 66 IN | OXYGEN SATURATION: 98 %

## 2019-02-01 DIAGNOSIS — E87.6 LOW BLOOD POTASSIUM: ICD-10-CM

## 2019-02-01 DIAGNOSIS — E06.9 THYROIDITIS: ICD-10-CM

## 2019-02-01 DIAGNOSIS — R07.89 ATYPICAL CHEST PAIN: Primary | ICD-10-CM

## 2019-02-01 DIAGNOSIS — R07.89 ATYPICAL CHEST PAIN: ICD-10-CM

## 2019-02-01 LAB
ALBUMIN SERPL-MCNC: 3.7 G/DL (ref 3.1–4.5)
ALBUMIN/GLOB SERPL: 1 {RATIO} (ref 1–2)
ALP LIVER SERPL-CCNC: 75 U/L (ref 46–118)
ALT SERPL-CCNC: 65 U/L (ref 14–54)
ANION GAP SERPL CALC-SCNC: 6 MMOL/L (ref 0–18)
AST SERPL-CCNC: 30 U/L (ref 15–41)
BILIRUB SERPL-MCNC: 0.2 MG/DL (ref 0.1–2)
BUN BLD-MCNC: 20 MG/DL (ref 8–20)
BUN/CREAT SERPL: 24.1 (ref 10–20)
CALCIUM BLD-MCNC: 8.3 MG/DL (ref 8.3–10.3)
CHLORIDE SERPL-SCNC: 108 MMOL/L (ref 101–111)
CO2 SERPL-SCNC: 28 MMOL/L (ref 22–32)
CREAT BLD-MCNC: 0.83 MG/DL (ref 0.55–1.02)
GLOBULIN PLAS-MCNC: 3.6 G/DL (ref 2.8–4.4)
GLUCOSE BLD-MCNC: 101 MG/DL (ref 70–99)
HAV IGM SER QL: 2.2 MG/DL (ref 1.8–2.5)
M PROTEIN MFR SERPL ELPH: 7.3 G/DL (ref 6.4–8.2)
OSMOLALITY SERPL CALC.SUM OF ELEC: 297 MOSM/KG (ref 275–295)
POTASSIUM SERPL-SCNC: 4 MMOL/L (ref 3.6–5.1)
SODIUM SERPL-SCNC: 142 MMOL/L (ref 136–144)
T3FREE SERPL-MCNC: 2.29 PG/ML (ref 2.3–4.2)
T4 FREE SERPL-MCNC: 1 NG/DL (ref 0.9–1.8)
THYROGLOB SERPL-MCNC: <15 U/ML (ref ?–60)
THYROPEROXIDASE AB SERPL-ACNC: 28 U/ML (ref ?–60)
TSI SER-ACNC: 0.63 MIU/ML (ref 0.35–5.5)

## 2019-02-01 PROCEDURE — 84443 ASSAY THYROID STIM HORMONE: CPT

## 2019-02-01 PROCEDURE — 80053 COMPREHEN METABOLIC PANEL: CPT

## 2019-02-01 PROCEDURE — 1111F DSCHRG MED/CURRENT MED MERGE: CPT | Performed by: FAMILY MEDICINE

## 2019-02-01 PROCEDURE — 86376 MICROSOMAL ANTIBODY EACH: CPT

## 2019-02-01 PROCEDURE — 83735 ASSAY OF MAGNESIUM: CPT

## 2019-02-01 PROCEDURE — 84481 FREE ASSAY (FT-3): CPT

## 2019-02-01 PROCEDURE — 99495 TRANSJ CARE MGMT MOD F2F 14D: CPT | Performed by: FAMILY MEDICINE

## 2019-02-01 PROCEDURE — 86800 THYROGLOBULIN ANTIBODY: CPT

## 2019-02-01 PROCEDURE — 84439 ASSAY OF FREE THYROXINE: CPT

## 2019-02-01 NOTE — PROGRESS NOTES
HPI:    Quentin Campuzano is a 59year old female here today for hospital follow up.    She was discharged from Inpatient hospital, [facility] to Home   Admit Date: 1/16/19   Discharge Date: 1/17/19  Hospital Discharge Diagnosis:    Chest pain ; lo 0.05 MG/24HR Transdermal Patch Biweekly Place 1 patch onto the skin twice a week. EPINEPHrine 0.3 MG/0.3ML Injection Solution Auto-injector as needed. Levalbuterol HCl 0.63 MG/3ML Inhalation Nebu Soln as needed.      Beta Glucan Does not apply Powder (); colonoscopy (); colonoscopy ();  ([id number]); tonsillectomy; other surgical history (); other surgical history (); other surgical history (); appendectomy (); other surgical history (); and hysteroscopy ( Resp 16   Ht 66\"   Wt 186 lb   LMP 10/13/2007   SpO2 98%   BMI 30.02 kg/m²    GENERAL: well developed, well nourished, in no apparent distress  SKIN: no rashes, no suspicious lesions  HEENT: atraumatic, normocephalic, ears and throat are clear  EYES: PER to 30 days:   · Number of Possible Diagnoses and/or Management Options: moderate  · Amount and/or Complexity of Data to Be Reviewed: moderate  · Risk of Significant Complications, Morbidity, and/or Mortality: moderate    Overall Risk:   moderate    Patient

## 2019-02-03 ENCOUNTER — PATIENT MESSAGE (OUTPATIENT)
Dept: FAMILY MEDICINE CLINIC | Facility: CLINIC | Age: 59
End: 2019-02-03

## 2019-02-04 NOTE — TELEPHONE ENCOUNTER
From: Joshua Sebastian  To: Dilia Garvey DO  Sent: 2/3/2019 3:26 PM CST  Subject: Test Results Question    I was on the cardiac floor on 1/17/19, and Dr. Tony Hardin discharged me based on the results of my Nuclear Medicine Exercise Stress test. The

## 2019-03-27 ENCOUNTER — LAB ENCOUNTER (OUTPATIENT)
Dept: LAB | Age: 59
End: 2019-03-27
Attending: INTERNAL MEDICINE
Payer: COMMERCIAL

## 2019-03-27 DIAGNOSIS — I51.9 MYXEDEMA HEART DISEASE: Primary | ICD-10-CM

## 2019-03-27 DIAGNOSIS — E03.9 MYXEDEMA HEART DISEASE: Primary | ICD-10-CM

## 2019-03-27 LAB
T3FREE SERPL-MCNC: 2.47 PG/ML (ref 2.4–4.2)
T4 FREE SERPL-MCNC: 1 NG/DL (ref 0.8–1.7)
THYROGLOB SERPL-MCNC: <15 U/ML (ref ?–60)
THYROPEROXIDASE AB SERPL-ACNC: <28 U/ML (ref ?–60)
TSI SER-ACNC: 0.6 MIU/ML (ref 0.36–3.74)

## 2019-03-27 PROCEDURE — 84443 ASSAY THYROID STIM HORMONE: CPT

## 2019-03-27 PROCEDURE — 86376 MICROSOMAL ANTIBODY EACH: CPT

## 2019-03-27 PROCEDURE — 84481 FREE ASSAY (FT-3): CPT

## 2019-03-27 PROCEDURE — 86800 THYROGLOBULIN ANTIBODY: CPT

## 2019-03-27 PROCEDURE — 84439 ASSAY OF FREE THYROXINE: CPT

## 2019-05-11 ENCOUNTER — HOSPITAL ENCOUNTER (OUTPATIENT)
Dept: GENERAL RADIOLOGY | Facility: HOSPITAL | Age: 59
Discharge: HOME | End: 2019-05-11
Attending: INTERNAL MEDICINE
Payer: COMMERCIAL

## 2019-05-11 DIAGNOSIS — R10.13 ABDOMINAL PAIN, EPIGASTRIC: ICD-10-CM

## 2019-05-11 PROCEDURE — 74240 X-RAY XM UPR GI TRC 1CNTRST: CPT | Performed by: INTERNAL MEDICINE

## 2019-05-13 NOTE — PROGRESS NOTES
Here are the results from your recent testing : The upper GI xray was normal other than a small hiatal hernia. If you need any further assistance, please feel free to call [phone]. Thank you for letting us care for you .     Sincerely ,     Lexi Steele

## 2019-05-16 PROCEDURE — 87338 HPYLORI STOOL AG IA: CPT | Performed by: INTERNAL MEDICINE

## 2019-06-10 ENCOUNTER — OFFICE VISIT (OUTPATIENT)
Dept: FAMILY MEDICINE CLINIC | Facility: CLINIC | Age: 59
End: 2019-06-10
Payer: COMMERCIAL

## 2019-06-10 VITALS
DIASTOLIC BLOOD PRESSURE: 76 MMHG | SYSTOLIC BLOOD PRESSURE: 130 MMHG | RESPIRATION RATE: 18 BRPM | OXYGEN SATURATION: 98 % | BODY MASS INDEX: 29.86 KG/M2 | WEIGHT: 188 LBS | HEART RATE: 81 BPM | HEIGHT: 66.5 IN

## 2019-06-10 DIAGNOSIS — K44.9 HIATAL HERNIA: ICD-10-CM

## 2019-06-10 DIAGNOSIS — K43.9 VENTRAL HERNIA WITHOUT OBSTRUCTION OR GANGRENE: Primary | ICD-10-CM

## 2019-06-10 PROCEDURE — 99213 OFFICE O/P EST LOW 20 MIN: CPT | Performed by: FAMILY MEDICINE

## 2019-06-10 RX ORDER — OMEPRAZOLE 40 MG/1
40 CAPSULE, DELAYED RELEASE ORAL DAILY
Qty: 30 CAPSULE | Refills: 2 | Status: SHIPPED | OUTPATIENT
Start: 2019-06-10 | End: 2019-10-11 | Stop reason: ALTCHOICE

## 2019-06-10 NOTE — PROGRESS NOTES
HPI:    Key Rhodes is a 59year old female here to follow up on multiple concerns     No further chest pain  Pt continued to have palpitations that were determined to be PVC's     HOLTER  Summary:    1. Left ventricle:  The cavity size is normal. Beta Glucan Does not apply Powder Jason's Beta 1,3 Glucan 500 mg capsules Sig: Pt to take 1 capsule QD Dispense #90 with 3 refills   Magnesium Gluconate 250 MG Oral Tab Take 750 mg by mouth.    omega-3 fatty acids (FISH OIL) 1000 MG Oral Cap Take 1,000 mg b history (2013); hysteroscopy (07/20/2018); and colonoscopy (N/A, 3/22/2019). She family history includes Breast Cancer in her paternal aunt; Breast Cancer (age of onset: 28) in her cousin;  Cancer in her mother and another family member; HYPOTHYROID in h JVD  CHEST: no chest tenderness  LUNGS: clear to auscultation  CARDIO: RRR without murmur  GI: good BS's, no masses when supine with abdominal wall flexion, + mid upper abdominal prominence when standing, no pain   MUSCULOSKELETAL: back is not tender, FROM

## 2019-06-12 ENCOUNTER — PATIENT MESSAGE (OUTPATIENT)
Dept: FAMILY MEDICINE CLINIC | Facility: CLINIC | Age: 59
End: 2019-06-12

## 2019-06-12 NOTE — TELEPHONE ENCOUNTER
From: Sorin Gutiérrez  To: Gretchen Proctor DO  Sent: 6/12/2019 10:29 AM CDT  Subject: Test Results Question    Will you please provide me with your fax number to which I can fax an MRI report that I promised to Dr. Luz Elena Chisholm?   Thank you,  Corby Elliott

## 2019-10-03 ENCOUNTER — PATIENT MESSAGE (OUTPATIENT)
Dept: FAMILY MEDICINE CLINIC | Facility: CLINIC | Age: 59
End: 2019-10-03

## 2019-10-03 DIAGNOSIS — E06.9 THYROIDITIS: ICD-10-CM

## 2019-10-03 DIAGNOSIS — E55.9 VITAMIN D DEFICIENCY: ICD-10-CM

## 2019-10-03 DIAGNOSIS — R73.9 HYPERGLYCEMIA: ICD-10-CM

## 2019-10-03 DIAGNOSIS — Z00.00 ROUTINE GENERAL MEDICAL EXAMINATION AT A HEALTH CARE FACILITY: Primary | ICD-10-CM

## 2019-10-03 NOTE — TELEPHONE ENCOUNTER
From: Scott Lewis  To: Marsha Thomas DO  Sent: 10/3/2019 8:26 AM CDT  Subject: Visit Follow-up Question    Good morning. I have my physical with Dr. Bhupinder Malone scheduled for October 11th.  I would like to be able to discuss lab work at my appointment,

## 2019-10-04 ENCOUNTER — APPOINTMENT (OUTPATIENT)
Dept: LAB | Age: 59
End: 2019-10-04
Attending: FAMILY MEDICINE
Payer: COMMERCIAL

## 2019-10-04 DIAGNOSIS — R74.8 ELEVATED LIVER ENZYMES: ICD-10-CM

## 2019-10-04 PROCEDURE — 36415 COLL VENOUS BLD VENIPUNCTURE: CPT | Performed by: FAMILY MEDICINE

## 2019-10-04 PROCEDURE — 84439 ASSAY OF FREE THYROXINE: CPT | Performed by: FAMILY MEDICINE

## 2019-10-04 PROCEDURE — 85025 COMPLETE CBC W/AUTO DIFF WBC: CPT | Performed by: FAMILY MEDICINE

## 2019-10-04 PROCEDURE — 86800 THYROGLOBULIN ANTIBODY: CPT | Performed by: FAMILY MEDICINE

## 2019-10-04 PROCEDURE — 80061 LIPID PANEL: CPT | Performed by: FAMILY MEDICINE

## 2019-10-04 PROCEDURE — 82306 VITAMIN D 25 HYDROXY: CPT | Performed by: FAMILY MEDICINE

## 2019-10-04 PROCEDURE — 80053 COMPREHEN METABOLIC PANEL: CPT

## 2019-10-04 PROCEDURE — 82607 VITAMIN B-12: CPT | Performed by: FAMILY MEDICINE

## 2019-10-04 PROCEDURE — 84481 FREE ASSAY (FT-3): CPT | Performed by: FAMILY MEDICINE

## 2019-10-04 PROCEDURE — 84443 ASSAY THYROID STIM HORMONE: CPT | Performed by: FAMILY MEDICINE

## 2019-10-04 PROCEDURE — 86376 MICROSOMAL ANTIBODY EACH: CPT | Performed by: FAMILY MEDICINE

## 2019-10-11 ENCOUNTER — OFFICE VISIT (OUTPATIENT)
Dept: FAMILY MEDICINE CLINIC | Facility: CLINIC | Age: 59
End: 2019-10-11
Payer: COMMERCIAL

## 2019-10-11 VITALS
BODY MASS INDEX: 26.52 KG/M2 | TEMPERATURE: 98 F | DIASTOLIC BLOOD PRESSURE: 72 MMHG | SYSTOLIC BLOOD PRESSURE: 124 MMHG | WEIGHT: 167 LBS | OXYGEN SATURATION: 99 % | HEART RATE: 88 BPM | HEIGHT: 66.5 IN | RESPIRATION RATE: 16 BRPM

## 2019-10-11 DIAGNOSIS — M51.24 BULGING OF THORACIC INTERVERTEBRAL DISC: ICD-10-CM

## 2019-10-11 DIAGNOSIS — Z00.00 ANNUAL PHYSICAL EXAM: Primary | ICD-10-CM

## 2019-10-11 DIAGNOSIS — M51.26 BULGING OF LUMBAR INTERVERTEBRAL DISC: ICD-10-CM

## 2019-10-11 DIAGNOSIS — M54.40 CHRONIC LOW BACK PAIN WITH SCIATICA, SCIATICA LATERALITY UNSPECIFIED, UNSPECIFIED BACK PAIN LATERALITY: ICD-10-CM

## 2019-10-11 DIAGNOSIS — M48.00 SPINAL STENOSIS, UNSPECIFIED SPINAL REGION: ICD-10-CM

## 2019-10-11 DIAGNOSIS — M50.20 BULGING OF CERVICAL INTERVERTEBRAL DISC: ICD-10-CM

## 2019-10-11 DIAGNOSIS — G89.29 CHRONIC LOW BACK PAIN WITH SCIATICA, SCIATICA LATERALITY UNSPECIFIED, UNSPECIFIED BACK PAIN LATERALITY: ICD-10-CM

## 2019-10-11 PROBLEM — R73.9 HYPERGLYCEMIA: Status: RESOLVED | Noted: 2017-11-28 | Resolved: 2019-10-11

## 2019-10-11 PROBLEM — R07.9 ACUTE CHEST PAIN: Status: RESOLVED | Noted: 2019-01-17 | Resolved: 2019-10-11

## 2019-10-11 PROCEDURE — 99396 PREV VISIT EST AGE 40-64: CPT | Performed by: FAMILY MEDICINE

## 2019-10-11 PROCEDURE — 90471 IMMUNIZATION ADMIN: CPT | Performed by: FAMILY MEDICINE

## 2019-10-11 PROCEDURE — 90686 IIV4 VACC NO PRSV 0.5 ML IM: CPT | Performed by: FAMILY MEDICINE

## 2019-10-11 NOTE — PROGRESS NOTES
HPI:   Wilmer Arreola is a 59year old female who presents for a complete physical exam.     Wt Readings from Last 6 Encounters:  10/11/19 : 167 lb (75.8 kg)  06/12/19 : 184 lb (83.5 kg)  06/10/19 : 188 lb (85.3 kg)  05/01/19 : 190 lb (86.2 kg)  04/24 for occasional flares  Pt goes to chiro every 2 weeks and pt has an inversion table   Pt in PT as well        Current Outpatient Medications:  Estradiol 0.05 MG/24HR Transdermal Patch Biweekly Place 1 patch onto the skin twice a week.  Disp: 24 patch Rfl: 4 disorder) 8/15/2005   • AR (allergic rhinitis) 8/15/2005   • Asthma    • Diverticulosis 8/15/2008   • DJD (degenerative joint disease)    • Hashimoto's thyroiditis    • Herniated disc, cervical    • Lactose intolerance 8/15/2008   • Migraine headache 8/15/ dx age- 57   • Ovarian Cancer Paternal Cousin Female    • Other (Pancreatic Cancer) Maternal Aunt         stage 4 metastatic      Social History:   Social History    Tobacco Use      Smoking status: Never Smoker      Smokeless tobacco: Never Used intact    ASSESSMENT AND PLAN:   Ayaan Daniels is a 59year old female who presents for annual physical     1. Annual physical exam      2. Bulging of cervical intervertebral disc  Note for travel     3.  Bulging of thoracic intervertebral disc  Note

## 2020-02-12 ENCOUNTER — OFFICE VISIT (OUTPATIENT)
Dept: FAMILY MEDICINE CLINIC | Facility: CLINIC | Age: 60
End: 2020-02-12
Payer: COMMERCIAL

## 2020-02-12 VITALS
RESPIRATION RATE: 16 BRPM | OXYGEN SATURATION: 98 % | SYSTOLIC BLOOD PRESSURE: 136 MMHG | WEIGHT: 167 LBS | BODY MASS INDEX: 27.49 KG/M2 | HEIGHT: 65.5 IN | DIASTOLIC BLOOD PRESSURE: 84 MMHG | TEMPERATURE: 99 F | HEART RATE: 77 BPM

## 2020-02-12 DIAGNOSIS — B02.22 TRIGEMINAL HERPES ZOSTER: Primary | ICD-10-CM

## 2020-02-12 PROCEDURE — 99214 OFFICE O/P EST MOD 30 MIN: CPT | Performed by: FAMILY MEDICINE

## 2020-02-12 RX ORDER — PREDNISONE 20 MG/1
60 TABLET ORAL DAILY
Qty: 15 TABLET | Refills: 0 | Status: SHIPPED | OUTPATIENT
Start: 2020-02-12 | End: 2020-02-17

## 2020-02-12 RX ORDER — ACYCLOVIR 800 MG/1
800 TABLET ORAL
Qty: 35 TABLET | Refills: 0 | Status: SHIPPED | OUTPATIENT
Start: 2020-02-12 | End: 2020-02-20

## 2020-02-12 NOTE — PROGRESS NOTES
HPI:   Key Rhodes is a 62year old female who presents with rash     Pt woke up with red rash to right lateral eye yesterday   It was oozing and crusted   Pt reports she now has pressure type pain radiation to the head   No vision changes  Stress hyperactivity disorder) 8/15/2005   • AR (allergic rhinitis) 8/15/2005   • Asthma    • BRCA1 negative    • BRCA2 negative    • Diverticulosis 8/15/2008   • DJD (degenerative joint disease)    • Hashimoto's thyroiditis    • Herniated disc, cervical    • Lac 1st cousin- dx age 28   • Breast Cancer Paternal Aunt         dx age- 59   • Ovarian Cancer Paternal Cousin Female    • Other (Pancreatic Cancer) Maternal Aunt         stage 4 metastatic      Social History:   Social History    Tobacco Use      Smoking sta OPHTHALMOLOGY - INTERNAL  - acyclovir 800 MG Oral Tab; Take 1 tablet (800 mg total) by mouth 5 (five) times daily for 7 days. Dispense: 35 tablet; Refill: 0     Questions answered and patient indicates understanding of these issues and agrees to the plan.

## 2020-03-06 ENCOUNTER — OFFICE VISIT (OUTPATIENT)
Dept: FAMILY MEDICINE CLINIC | Facility: CLINIC | Age: 60
End: 2020-03-06
Payer: COMMERCIAL

## 2020-03-06 VITALS
BODY MASS INDEX: 27.33 KG/M2 | HEIGHT: 65.5 IN | SYSTOLIC BLOOD PRESSURE: 118 MMHG | DIASTOLIC BLOOD PRESSURE: 70 MMHG | HEART RATE: 98 BPM | RESPIRATION RATE: 16 BRPM | OXYGEN SATURATION: 98 % | WEIGHT: 166 LBS | TEMPERATURE: 98 F

## 2020-03-06 DIAGNOSIS — B02.22 TRIGEMINAL HERPES ZOSTER: Primary | ICD-10-CM

## 2020-03-06 DIAGNOSIS — M48.00 SPINAL STENOSIS, UNSPECIFIED SPINAL REGION: ICD-10-CM

## 2020-03-06 PROCEDURE — 99213 OFFICE O/P EST LOW 20 MIN: CPT | Performed by: FAMILY MEDICINE

## 2020-03-06 RX ORDER — PREDNISONE 10 MG/1
TABLET ORAL
Qty: 105 TABLET | Refills: 0 | Status: SHIPPED | OUTPATIENT
Start: 2020-03-06 | End: 2020-04-05

## 2020-03-06 NOTE — PROGRESS NOTES
HPI:   China Strange is a 62year old female who presents for shingles follow up     Pt had a second flare of shingles and just finished the prednisone and acyclovir   Pt doing much better    Pt still having some trigeminal neuralgia   Pt declines ga ALBUTEROL SULFATE (PROVENTIL HFA) 108 (90 BASE) MCG/ACT Inhalation Aero Soln Inhale 2 Puffs into the lungs every 4 (four) hours as needed for Wheezing.  3 Inhaler 0   • BORON OR BID     • VITAMIN D OR 5000 IU Daily     • MSM  MG daily     • MULTI-STEVEN 2)ASTHMA   • Other (Other) Son         ASTHMA   • Heart Disorder Maternal Grandfather         MI AT 77   • Heart Disorder Paternal Grandfather         CHF   • Hypertension Sister    • Other (SLE) Sister    • Other (HYPOTHYROID) Sister    • Psychiatric Son lateral eye/ no vesicles / no other lesions noted   EXTREMITIES: no cyanosis, clubbing or edema  NEURO: cranial nerves are intact,motor and sensory are grossly intact    ASSESSMENT AND PLAN:   Tamir Lamar is a 62year old female who presents with

## 2020-03-25 ENCOUNTER — PATIENT MESSAGE (OUTPATIENT)
Dept: FAMILY MEDICINE CLINIC | Facility: CLINIC | Age: 60
End: 2020-03-25

## 2020-03-25 NOTE — TELEPHONE ENCOUNTER
From: Trinidad Perera  To: Vu Lindsey DO  Sent: 3/25/2020 8:06 AM CDT  Subject: Test Results Question    For my electronic records entry, I am attaching the receipt for the [id number] I was administered on March 14th 2020.

## 2020-09-25 ENCOUNTER — APPOINTMENT (OUTPATIENT)
Dept: LAB | Age: 60
End: 2020-09-25
Attending: FAMILY MEDICINE
Payer: COMMERCIAL

## 2020-10-02 ENCOUNTER — OFFICE VISIT (OUTPATIENT)
Dept: FAMILY MEDICINE CLINIC | Facility: CLINIC | Age: 60
End: 2020-10-02
Payer: COMMERCIAL

## 2020-10-02 VITALS
BODY MASS INDEX: 27.33 KG/M2 | SYSTOLIC BLOOD PRESSURE: 134 MMHG | WEIGHT: 166 LBS | HEART RATE: 86 BPM | DIASTOLIC BLOOD PRESSURE: 66 MMHG | TEMPERATURE: 96 F | RESPIRATION RATE: 16 BRPM | OXYGEN SATURATION: 98 % | HEIGHT: 65.5 IN

## 2020-10-02 DIAGNOSIS — G89.29 CHRONIC BACK PAIN, UNSPECIFIED BACK LOCATION, UNSPECIFIED BACK PAIN LATERALITY: ICD-10-CM

## 2020-10-02 DIAGNOSIS — R00.2 PALPITATIONS: ICD-10-CM

## 2020-10-02 DIAGNOSIS — Z23 NEED FOR VACCINATION: ICD-10-CM

## 2020-10-02 DIAGNOSIS — M54.9 CHRONIC BACK PAIN, UNSPECIFIED BACK LOCATION, UNSPECIFIED BACK PAIN LATERALITY: ICD-10-CM

## 2020-10-02 DIAGNOSIS — Z00.00 ANNUAL PHYSICAL EXAM: Primary | ICD-10-CM

## 2020-10-02 DIAGNOSIS — R92.8 ABNORMAL MAMMOGRAM: ICD-10-CM

## 2020-10-02 PROCEDURE — 3075F SYST BP GE 130 - 139MM HG: CPT | Performed by: FAMILY MEDICINE

## 2020-10-02 PROCEDURE — 90686 IIV4 VACC NO PRSV 0.5 ML IM: CPT | Performed by: FAMILY MEDICINE

## 2020-10-02 PROCEDURE — 3008F BODY MASS INDEX DOCD: CPT | Performed by: FAMILY MEDICINE

## 2020-10-02 PROCEDURE — 90471 IMMUNIZATION ADMIN: CPT | Performed by: FAMILY MEDICINE

## 2020-10-02 PROCEDURE — 99396 PREV VISIT EST AGE 40-64: CPT | Performed by: FAMILY MEDICINE

## 2020-10-02 PROCEDURE — 3078F DIAST BP <80 MM HG: CPT | Performed by: FAMILY MEDICINE

## 2020-10-02 NOTE — PROGRESS NOTES
HPI:   Kati Mclaughlin is a 62year old female who presents for a complete physical exam.     Wt Readings from Last 6 Encounters:  10/02/20 : 166 lb (75.3 kg)  03/25/20 : 163 lb (73.9 kg)  03/06/20 : 166 lb (75.3 kg)  02/12/20 : 167 lb (75.8 kg)  10/11 cervical disc issues C6-7/ L4-S1/ has not needed for almost a year         Current Outpatient Medications   Medication Sig Dispense Refill   • Estradiol 0.05 MG/24HR Transdermal Patch Biweekly Place 1 patch onto the skin twice a week.  24 patch 2   • Proges thyroiditis    • Herniated disc, cervical    • Lactose intolerance 8/15/2008   • Migraine headache 8/15/1973   • MVP (mitral valve prolapse) 8/15/2005   • Primary osteoarthritis of right knee 5/9/2018   • Spinal stenosis     CERVICAL MRI   • Transfusion 19 History:   Social History    Tobacco Use      Smoking status: Never Smoker      Smokeless tobacco: Never Used    Alcohol use: Yes      Alcohol/week: 0.0 standard drinks      Frequency: Monthly or less      Drinks per session: 1 or 2      Comment:  Occ glass presents for annual physical     1. Annual physical exam      2. Abnormal mammogram    - [facility] MEL 2D+3D DIAGNOSTIC [facility]  BILAT (CPT=77066/32712); Future    3. Need for vaccination    - FLULAVAL INFLUENZA VACCINE QUAD PRESERVATIVE FREE 0.5 ML    4.  Chronic b

## 2020-10-03 RX ORDER — METAXALONE 800 MG/1
TABLET ORAL
Qty: 30 TABLET | Refills: 0 | Status: SHIPPED | OUTPATIENT
Start: 2020-10-03 | End: 2021-03-26

## 2020-10-07 ENCOUNTER — PATIENT MESSAGE (OUTPATIENT)
Dept: FAMILY MEDICINE CLINIC | Facility: CLINIC | Age: 60
End: 2020-10-07

## 2020-10-08 NOTE — TELEPHONE ENCOUNTER
From: Carlos Loss  To: Jorge Burdick DO  Sent: 10/7/2020 10:24 PM CDT  Subject: Visit Follow-up Question    I just read that since I had the [id number] just this past February, the guidelines are to wait a year before getting the PCV13.  Do you agr

## 2020-10-08 NOTE — TELEPHONE ENCOUNTER
Pneumovax 23 3/14/2020     Patient has appt on 10/16/2020 for Prevnar 13. LE, do you want patient to wait until March 2021? Or come in on 10/16?

## 2020-10-16 ENCOUNTER — NURSE ONLY (OUTPATIENT)
Dept: FAMILY MEDICINE CLINIC | Facility: CLINIC | Age: 60
End: 2020-10-16
Payer: COMMERCIAL

## 2020-10-16 PROCEDURE — 90670 PCV13 VACCINE IM: CPT | Performed by: FAMILY MEDICINE

## 2020-10-16 PROCEDURE — 90471 IMMUNIZATION ADMIN: CPT | Performed by: FAMILY MEDICINE

## 2020-10-20 ENCOUNTER — ORDER TRANSCRIPTION (OUTPATIENT)
Dept: ADMINISTRATIVE | Facility: HOSPITAL | Age: 60
End: 2020-10-20

## 2020-10-20 DIAGNOSIS — Z13.6 SCREENING FOR CARDIOVASCULAR CONDITION: Primary | ICD-10-CM

## 2020-10-21 ENCOUNTER — HOSPITAL ENCOUNTER (OUTPATIENT)
Dept: CT IMAGING | Facility: HOSPITAL | Age: 60
Discharge: HOME | End: 2020-10-21
Attending: FAMILY MEDICINE

## 2020-10-21 DIAGNOSIS — Z13.6 SCREENING FOR CARDIOVASCULAR CONDITION: ICD-10-CM

## 2020-10-23 ENCOUNTER — APPOINTMENT (OUTPATIENT)
Dept: CT IMAGING | Facility: HOSPITAL | Age: 60
End: 2020-10-23
Attending: EMERGENCY MEDICINE
Payer: COMMERCIAL

## 2020-10-23 ENCOUNTER — HOSPITAL ENCOUNTER (EMERGENCY)
Facility: HOSPITAL | Age: 60
Discharge: HOME | End: 2020-10-23
Attending: EMERGENCY MEDICINE
Payer: COMMERCIAL

## 2020-10-23 ENCOUNTER — PATIENT MESSAGE (OUTPATIENT)
Dept: FAMILY MEDICINE CLINIC | Facility: CLINIC | Age: 60
End: 2020-10-23

## 2020-10-23 VITALS
OXYGEN SATURATION: 100 % | RESPIRATION RATE: 18 BRPM | SYSTOLIC BLOOD PRESSURE: 122 MMHG | HEART RATE: 84 BPM | BODY MASS INDEX: 26.2 KG/M2 | HEIGHT: 66 IN | TEMPERATURE: 99 F | WEIGHT: 163 LBS | DIASTOLIC BLOOD PRESSURE: 64 MMHG

## 2020-10-23 DIAGNOSIS — R74.8 ELEVATED LIVER ENZYMES: Primary | ICD-10-CM

## 2020-10-23 DIAGNOSIS — R74.8 ELEVATED LIVER ENZYMES: ICD-10-CM

## 2020-10-23 DIAGNOSIS — K57.92 ACUTE DIVERTICULITIS: Primary | ICD-10-CM

## 2020-10-23 PROCEDURE — 99285 EMERGENCY DEPT VISIT HI MDM: CPT

## 2020-10-23 PROCEDURE — 96367 TX/PROPH/DG ADDL SEQ IV INF: CPT

## 2020-10-23 PROCEDURE — S0030 INJECTION, METRONIDAZOLE: HCPCS | Performed by: EMERGENCY MEDICINE

## 2020-10-23 PROCEDURE — 83690 ASSAY OF LIPASE: CPT | Performed by: EMERGENCY MEDICINE

## 2020-10-23 PROCEDURE — 81001 URINALYSIS AUTO W/SCOPE: CPT | Performed by: EMERGENCY MEDICINE

## 2020-10-23 PROCEDURE — 96361 HYDRATE IV INFUSION ADD-ON: CPT

## 2020-10-23 PROCEDURE — 96365 THER/PROPH/DIAG IV INF INIT: CPT

## 2020-10-23 PROCEDURE — 85025 COMPLETE CBC W/AUTO DIFF WBC: CPT | Performed by: EMERGENCY MEDICINE

## 2020-10-23 PROCEDURE — 80053 COMPREHEN METABOLIC PANEL: CPT | Performed by: EMERGENCY MEDICINE

## 2020-10-23 PROCEDURE — 99284 EMERGENCY DEPT VISIT MOD MDM: CPT

## 2020-10-23 PROCEDURE — 74177 CT ABD & PELVIS W/CONTRAST: CPT | Performed by: EMERGENCY MEDICINE

## 2020-10-23 RX ORDER — HYDROCODONE BITARTRATE AND ACETAMINOPHEN 5; 325 MG/1; MG/1
1-2 TABLET ORAL EVERY 6 HOURS PRN
Qty: 12 TABLET | Refills: 0 | Status: SHIPPED | OUTPATIENT
Start: 2020-10-23 | End: 2020-10-30

## 2020-10-23 RX ORDER — AMOXICILLIN AND CLAVULANATE POTASSIUM 875; 125 MG/1; MG/1
1 TABLET, FILM COATED ORAL 2 TIMES DAILY
Qty: 20 TABLET | Refills: 0 | Status: SHIPPED | OUTPATIENT
Start: 2020-10-23 | End: 2020-11-02

## 2020-10-23 RX ORDER — SODIUM CHLORIDE 9 MG/ML
INJECTION, SOLUTION INTRAVENOUS CONTINUOUS
Status: DISCONTINUED | OUTPATIENT
Start: 2020-10-23 | End: 2020-10-23

## 2020-10-23 RX ORDER — METRONIDAZOLE 500 MG/100ML
500 INJECTION, SOLUTION INTRAVENOUS ONCE
Status: COMPLETED | OUTPATIENT
Start: 2020-10-23 | End: 2020-10-23

## 2020-10-23 NOTE — ED PROVIDER NOTES
Patient Seen in: [facility] Emergency Department      History   Patient presents with:  Abdomen/Flank Pain  Fever    Stated Complaint: fever - abd pain and left flank pain    HPI    This is a 75-year-old woman here with complaint of diffuse lower ab HISTORY  2010    ENDOSCOPIC  SINUS   • OTHER SURGICAL HISTORY  2013    L  WRIST  PISIFORM-X  D/T  FX   • TONSILLECTOMY                      Social History    Tobacco Use      Smoking status: Never Smoker      Smokeless tobacco: Never Used    Alcohol use:  Boyd Guerrero (*)     Sodium 135 (*)     AST 91 (*)      (*)     A/G Ratio 0.9 (*)     All other components within normal limits   CBC W/ DIFFERENTIAL - Abnormal; Notable for the following components:    WBC 12.3 (*)     RDW-SD 47.3 (*)     Neutrophil Absolute Pr RCA:          0.00 / 0.00 LAD:          0.00 / 0.00 CIRCUMFLEX:      0.00 / 0.00 TOTAL:        0.00 / 0.00  Your Coronary Artery Calcium Score: Zero  Clinical Relevance of Coronary Artery Scan Results (may change depending on age and sex of patient.)  Calc abdominal pain, and left lower back pain for 4 days. CONTRAST USED:  87cc of Omnipaque 350  FINDINGS:  LIVER:  There is a small 0.4 cm hypoechoic nodule in the superior aspect of hepatic segment 7.  There is a water density capsular based cyst in the infe Negative. CONCLUSION:  1. There is a 6-7 cm segment of acute sigmoid colon diverticulitis without abscess or obstruction. Small amount of free pelvic fluid. 2. Benign bilateral renal cysts.   There is a 2.2 cm hepatic cyst in the right lobe of significant incidental finding identified. Please also refer to the cardiologist's report.     Dictated by (CST): Roselee Canavan, MD on 10/21/2020 at 4:18 PM     Finalized by (CST): Roselee Canavan, MD on 10/21/2020 at 4:23 PM             [facility]      Thi 0    HYDROcodone-acetaminophen 5-325 MG Oral Tab  Take 1-2 tablets by mouth every 6 (six) hours as needed for Pain. Do not drink alcohol or drive while taking this medication as it can impair your senses.   Qty: 12 tablet Refills: 0

## 2020-10-23 NOTE — ED INITIAL ASSESSMENT (HPI)
Pt reports intermittent abdominal pain that started on Tuesday and has progressively gotten worse w/ loose stool on tues/wed and then pain with bowel movement on Thursday. Reports tenderness throughout lower abdomen. Denies pain with urination or urgency.

## 2020-10-23 NOTE — TELEPHONE ENCOUNTER
From: Jas Kruse  To: Jaylin Calderon DO  Sent: 10/23/2020 2:28 PM CDT  Subject: Test Results Question    Hi. I am in the ER with diverticulitis and getting an IV antibiotic treatment before being sent home with a course of oral antibiotics.  The ER

## 2020-10-23 NOTE — ED NOTES
Pt updated on POC, awaiting CT scan for further POC. Resting abed w/ no acute distress noted.  Will continue to monitor

## 2020-10-23 NOTE — ED NOTES
Pt provided UA, sent to lab. Pt has no further needs voiced at this time other than request to eat. Informed that once CT results we will attempt to get her something to eat.

## 2020-11-01 ENCOUNTER — PATIENT MESSAGE (OUTPATIENT)
Dept: FAMILY MEDICINE CLINIC | Facility: CLINIC | Age: 60
End: 2020-11-01

## 2020-11-02 NOTE — TELEPHONE ENCOUNTER
From: Domenico Lyons  To: Kaur Han DO  Sent: 11/1/2020 10:40 AM CST  Subject: Test Results Question    Your office sent me a letter stating that I never completed the lab work you ordered for my physical. Samia Dickson has apparently failed to send you

## 2020-11-05 ENCOUNTER — TELEMEDICINE (OUTPATIENT)
Dept: FAMILY MEDICINE CLINIC | Facility: CLINIC | Age: 60
End: 2020-11-05

## 2020-11-05 DIAGNOSIS — K57.92 DIVERTICULITIS: Primary | ICD-10-CM

## 2020-11-05 PROCEDURE — 99213 OFFICE O/P EST LOW 20 MIN: CPT | Performed by: FAMILY MEDICINE

## 2020-11-05 RX ORDER — METRONIDAZOLE 500 MG/1
500 TABLET ORAL 3 TIMES DAILY
Qty: 30 TABLET | Refills: 0 | Status: SHIPPED | OUTPATIENT
Start: 2020-11-05 | End: 2020-11-15

## 2020-11-05 RX ORDER — CIPROFLOXACIN 500 MG/1
500 TABLET, FILM COATED ORAL 2 TIMES DAILY
Qty: 20 TABLET | Refills: 0 | Status: SHIPPED | OUTPATIENT
Start: 2020-11-05 | End: 2020-11-15

## 2020-11-05 NOTE — PROGRESS NOTES
This visit is conducted using Telemedicine with live, interactive video and audio.       Telehealth outside of [address] Verbal Consent   I conducted a telehealth visit with Quentin Campuzano today, 11/05/20, which was completed using two-way, r same symptoms   LLQ burning started last night 8/10   At rest 1/10   With movement today 6/10     Pt will see Dr. Fernanda Rice next month     PROCEDURE:  CT ABDOMEN PELVIS IV CONTRAST, NO ORAL (ER)     COMPARISON:  None.      INDICATIONS:  fever - abd pain and l distal left colon and sigmoid colon. There is a 6-7 cm segment of acute diverticulitis involving the sigmoid colon there is infiltration of the fat without definite   abscess. There is no free air. There is no bowel obstruction.   There is a small amount (100 mg total) by mouth nightly. 90 capsule 2   • Levalbuterol HCl 0.63 MG/3ML Inhalation Nebu Soln as needed.        • Beta Glucan Does not apply Powder Jason's Beta 1,3 Glucan 500 mg capsules Sig: Pt to take 1 capsule QD Dispense #90 with 3 refills 90 Bottl EACH TIME TWINS.    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   Diet: watches calories closely     REVIEW OF SYSTEMS:   GENERAL: feels well otherwise  SKIN: denies any unusual skin lesions  EYES:denies blurred vision or double vision  HEENT: denies nasal congesti

## 2020-11-25 ENCOUNTER — APPOINTMENT (OUTPATIENT)
Dept: LAB | Age: 60
End: 2020-11-25
Attending: FAMILY MEDICINE
Payer: COMMERCIAL

## 2020-11-25 PROCEDURE — 80053 COMPREHEN METABOLIC PANEL: CPT | Performed by: FAMILY MEDICINE

## 2020-11-25 PROCEDURE — 36415 COLL VENOUS BLD VENIPUNCTURE: CPT | Performed by: FAMILY MEDICINE

## 2020-11-26 DIAGNOSIS — R00.2 PALPITATIONS: ICD-10-CM

## 2020-11-27 NOTE — TELEPHONE ENCOUNTER
Rx Request  metoprolol Tartrate 25 MG Oral Tab    Disp:      60              R: 0    Last Refilled: 10/02/2020    Last Visit: 10/02/2020    Protocol Passed?  Yes[ x ]       No[  ]

## 2020-12-03 RX ORDER — ESTRADIOL 0.05 MG/D
1 FILM, EXTENDED RELEASE TRANSDERMAL
Qty: 24 PATCH | Refills: 3 | Status: SHIPPED | OUTPATIENT
Start: 2020-12-03 | End: 2020-12-04

## 2021-01-03 ENCOUNTER — PATIENT MESSAGE (OUTPATIENT)
Dept: FAMILY MEDICINE CLINIC | Facility: CLINIC | Age: 61
End: 2021-01-03

## 2021-01-05 NOTE — TELEPHONE ENCOUNTER
From: Shonda Maciel  To: Caroline Trammell DO  Sent: 1/3/2021 9:46 PM CST  Subject: Other    Attached please find my 2nd shingles vaccination (Shingrex) record from 11/30/2020. Please enter into this proof of vaccination into my medical record.  Thank you

## 2021-01-24 DIAGNOSIS — R00.2 PALPITATIONS: ICD-10-CM

## 2021-03-26 DIAGNOSIS — R00.2 PALPITATIONS: ICD-10-CM

## 2021-05-22 DIAGNOSIS — R00.2 PALPITATIONS: ICD-10-CM

## 2021-07-28 DIAGNOSIS — R00.2 PALPITATIONS: ICD-10-CM

## 2021-10-01 ENCOUNTER — OFFICE VISIT (OUTPATIENT)
Dept: FAMILY MEDICINE CLINIC | Facility: CLINIC | Age: 61
End: 2021-10-01
Payer: COMMERCIAL

## 2021-10-01 VITALS
HEIGHT: 65.75 IN | OXYGEN SATURATION: 99 % | SYSTOLIC BLOOD PRESSURE: 126 MMHG | WEIGHT: 167 LBS | HEART RATE: 78 BPM | BODY MASS INDEX: 27.16 KG/M2 | RESPIRATION RATE: 16 BRPM | DIASTOLIC BLOOD PRESSURE: 86 MMHG

## 2021-10-01 DIAGNOSIS — R00.2 PALPITATIONS: ICD-10-CM

## 2021-10-01 DIAGNOSIS — Z00.00 ANNUAL PHYSICAL EXAM: Primary | ICD-10-CM

## 2021-10-01 DIAGNOSIS — N95.0 POST-MENOPAUSAL BLEEDING: ICD-10-CM

## 2021-10-01 PROCEDURE — 3074F SYST BP LT 130 MM HG: CPT | Performed by: FAMILY MEDICINE

## 2021-10-01 PROCEDURE — 3079F DIAST BP 80-89 MM HG: CPT | Performed by: FAMILY MEDICINE

## 2021-10-01 PROCEDURE — 3008F BODY MASS INDEX DOCD: CPT | Performed by: FAMILY MEDICINE

## 2021-10-01 PROCEDURE — 99396 PREV VISIT EST AGE 40-64: CPT | Performed by: FAMILY MEDICINE

## 2021-10-01 NOTE — PROGRESS NOTES
HPI:   Rob Gatica is a 56year old female who presents for a complete physical exam.     Wt Readings from Last 6 Encounters:  10/01/21 : 167 lb (75.8 kg)  03/26/21 : 165 lb (74.8 kg)  03/01/21 : 165 lb (74.8 kg)  12/01/20 : 160 lb (72.6 kg)  11/18 Pt sees  for allergies    Pt uses skelaxin and myofascial release for cervical disc issues C6-7/ L4-S1/ has not needed for almost a year   PT as needed         Current Outpatient Medications   Medication Sig Dispense Refill   • METOPROLOL TARTRA negative    • Diverticulitis     10/2020, 1/2021   • Diverticulosis 8/15/2008   • DJD (degenerative joint disease)    • Hashimoto's thyroiditis    • Herniated disc, cervical    • Lactose intolerance 8/15/2008   • Migraine headache 8/15/1973   • MVP (mitral Paternal Aunt 59        dx age- 59   • Ovarian Cancer Paternal Cousin Female    • Other (Pancreatic Cancer) Maternal Aunt         stage 4 metastatic      Social History:   Social History    Tobacco Use      Smoking status: Never Smoker      Smokeless tobac intact,motor and sensory are grossly intact    ASSESSMENT AND PLAN:   Alvin Rowe is a 56year old female who presents for annual physical     1. Annual physical exam      2.  Post-menopausal bleeding  See gyne  - US PELVIS (TRANSABDOMINAL AND TRANS

## 2021-12-29 ENCOUNTER — TELEMEDICINE (OUTPATIENT)
Dept: FAMILY MEDICINE CLINIC | Facility: CLINIC | Age: 61
End: 2021-12-29
Payer: COMMERCIAL

## 2021-12-29 DIAGNOSIS — R00.2 PALPITATIONS: Primary | ICD-10-CM

## 2021-12-29 PROCEDURE — 99213 OFFICE O/P EST LOW 20 MIN: CPT | Performed by: FAMILY MEDICINE

## 2021-12-29 NOTE — PROGRESS NOTES
This visit is conducted using Telemedicine with live, interactive video and audio.     Telehealth outside of [address] Verbal Consent   I conducted a telehealth visit with Heather gillespie, 12/29/21, which was completed using two-way, marjorie in ears and more fatigue on higher dose   Pt feels communication has not been been what she has wanted   Pt took herself off the metoprolol  Pt is due to see the electrophysiologist next week       Current Outpatient Medications   Medication Sig Dispense R negative    • BRCA2 negative    • Diverticulitis     10/2020, 1/2021   • Diverticulosis 8/15/2008   • DJD (degenerative joint disease)    • Hashimoto's thyroiditis    • Herniated disc, cervical    • Lactose intolerance 8/15/2008   • Migraine headache 8/15/ • Breast Cancer Paternal Aunt 59        dx age- 59   • Ovarian Cancer Paternal Cousin Female    • Other (Pancreatic Cancer) Maternal Aunt         stage 4 metastatic      Social History:   Social History    Tobacco Use      Smoking status: Never Smoker

## 2022-01-07 PROBLEM — I49.3 PVC (PREMATURE VENTRICULAR CONTRACTION): Status: ACTIVE | Noted: 2022-01-07

## 2022-01-07 PROBLEM — I10 HYPERTENSION, UNSPECIFIED TYPE: Status: RESOLVED | Noted: 2019-01-17 | Resolved: 2022-01-07

## 2022-01-14 ENCOUNTER — HOSPITAL ENCOUNTER (OUTPATIENT)
Dept: CV DIAGNOSTICS | Facility: HOSPITAL | Age: 62
Discharge: HOME | End: 2022-01-14
Attending: FAMILY MEDICINE
Payer: COMMERCIAL

## 2022-01-14 DIAGNOSIS — R00.2 PALPITATIONS: ICD-10-CM

## 2022-01-14 PROCEDURE — 93247 EXT ECG>7D<15D SCAN A/R: CPT | Performed by: FAMILY MEDICINE

## 2022-01-14 PROCEDURE — 93246 EXT ECG>7D<15D RECORDING: CPT | Performed by: FAMILY MEDICINE

## 2022-01-14 PROCEDURE — 93248 EXT ECG>7D<15D REV&INTERPJ: CPT | Performed by: FAMILY MEDICINE

## 2022-03-18 ENCOUNTER — PATIENT MESSAGE (OUTPATIENT)
Dept: FAMILY MEDICINE CLINIC | Facility: CLINIC | Age: 62
End: 2022-03-18

## 2022-03-18 NOTE — TELEPHONE ENCOUNTER
From: Keyanna Neal  To: Arnaud Salas DO  Sent: 3/18/2022 12:02 PM CDT  Subject: Correct COVID-19 Record    I have been informed that only Dr. Ketan Valladares or her staff can correct my COVID Vaccination record in your system. I never received a COVID vaccination in January 2020 (vaccines were not available then!). The other 2 vaccination dates and the booster date are correct. Please remove the January 2020 vaccination date. Thank you.

## 2022-03-28 ENCOUNTER — HOSPITAL ENCOUNTER (OUTPATIENT)
Dept: CV DIAGNOSTICS | Facility: HOSPITAL | Age: 62
Discharge: HOME | End: 2022-03-28
Attending: INTERNAL MEDICINE
Payer: COMMERCIAL

## 2022-03-28 DIAGNOSIS — R00.2 PALPITATIONS: ICD-10-CM

## 2022-03-28 DIAGNOSIS — I34.1 MITRAL VALVE PROLAPSE: ICD-10-CM

## 2022-03-28 DIAGNOSIS — R00.0 TACHYCARDIA: ICD-10-CM

## 2022-03-28 DIAGNOSIS — I49.3 PVC'S (PREMATURE VENTRICULAR CONTRACTIONS): ICD-10-CM

## 2022-03-28 PROCEDURE — 93306 TTE W/DOPPLER COMPLETE: CPT | Performed by: INTERNAL MEDICINE

## 2022-04-01 ENCOUNTER — HOSPITAL ENCOUNTER (OUTPATIENT)
Dept: MRI IMAGING | Facility: HOSPITAL | Age: 62
Discharge: HOME | End: 2022-04-01
Attending: INTERNAL MEDICINE
Payer: COMMERCIAL

## 2022-04-01 DIAGNOSIS — I49.3 PVC'S (PREMATURE VENTRICULAR CONTRACTIONS): ICD-10-CM

## 2022-04-01 DIAGNOSIS — R00.0 TACHYCARDIA: ICD-10-CM

## 2022-04-01 DIAGNOSIS — I34.1 MITRAL VALVE PROLAPSE: ICD-10-CM

## 2022-04-01 DIAGNOSIS — R00.2 PALPITATIONS: ICD-10-CM

## 2022-04-01 PROCEDURE — 75557 CARDIAC MRI FOR MORPH: CPT | Performed by: INTERNAL MEDICINE

## 2022-05-06 ENCOUNTER — APPOINTMENT (OUTPATIENT)
Dept: CT IMAGING | Facility: HOSPITAL | Age: 62
End: 2022-05-06
Attending: EMERGENCY MEDICINE
Payer: COMMERCIAL

## 2022-05-06 ENCOUNTER — HOSPITAL ENCOUNTER (EMERGENCY)
Facility: HOSPITAL | Age: 62
Discharge: HOME | End: 2022-05-06
Attending: EMERGENCY MEDICINE
Payer: COMMERCIAL

## 2022-05-06 VITALS
TEMPERATURE: 100 F | BODY MASS INDEX: 26.52 KG/M2 | HEART RATE: 86 BPM | WEIGHT: 165 LBS | HEIGHT: 66 IN | SYSTOLIC BLOOD PRESSURE: 144 MMHG | DIASTOLIC BLOOD PRESSURE: 69 MMHG | RESPIRATION RATE: 18 BRPM | OXYGEN SATURATION: 95 %

## 2022-05-06 DIAGNOSIS — K57.92 ACUTE DIVERTICULITIS: Primary | ICD-10-CM

## 2022-05-06 LAB
ALBUMIN SERPL-MCNC: 3.5 G/DL (ref 3.4–5)
ALBUMIN/GLOB SERPL: 0.8 {RATIO} (ref 1–2)
ALP LIVER SERPL-CCNC: 102 U/L
ALT SERPL-CCNC: 82 U/L
ANION GAP SERPL CALC-SCNC: 7 MMOL/L (ref 0–18)
AST SERPL-CCNC: 74 U/L (ref 15–37)
BASOPHILS # BLD AUTO: 0.02 X10(3) UL (ref 0–0.2)
BASOPHILS NFR BLD AUTO: 0.2 %
BILIRUB SERPL-MCNC: 0.9 MG/DL (ref 0.1–2)
BILIRUB UR QL STRIP.AUTO: NEGATIVE
BUN BLD-MCNC: 15 MG/DL (ref 7–18)
CALCIUM BLD-MCNC: 8.9 MG/DL (ref 8.5–10.1)
CHLORIDE SERPL-SCNC: 106 MMOL/L (ref 98–112)
CLARITY UR REFRACT.AUTO: CLEAR
CO2 SERPL-SCNC: 25 MMOL/L (ref 21–32)
COLOR UR AUTO: YELLOW
CREAT BLD-MCNC: 0.83 MG/DL
EOSINOPHIL # BLD AUTO: 0.01 X10(3) UL (ref 0–0.7)
EOSINOPHIL NFR BLD AUTO: 0.1 %
ERYTHROCYTE [DISTWIDTH] IN BLOOD BY AUTOMATED COUNT: 13.7 %
GLOBULIN PLAS-MCNC: 4.2 G/DL (ref 2.8–4.4)
GLUCOSE BLD-MCNC: 106 MG/DL (ref 70–99)
GLUCOSE UR STRIP.AUTO-MCNC: NEGATIVE MG/DL
HCT VFR BLD AUTO: 43.1 %
HGB BLD-MCNC: 13.7 G/DL
HYALINE CASTS #/AREA URNS AUTO: PRESENT /LPF
IMM GRANULOCYTES # BLD AUTO: 0.05 X10(3) UL (ref 0–1)
IMM GRANULOCYTES NFR BLD: 0.4 %
KETONES UR STRIP.AUTO-MCNC: NEGATIVE MG/DL
LEUKOCYTE ESTERASE UR QL STRIP.AUTO: NEGATIVE
LIPASE SERPL-CCNC: 133 U/L (ref 73–393)
LYMPHOCYTES # BLD AUTO: 1.51 X10(3) UL (ref 1–4)
LYMPHOCYTES NFR BLD AUTO: 12.9 %
MCH RBC QN AUTO: 29.5 PG (ref 26–34)
MCHC RBC AUTO-ENTMCNC: 31.8 G/DL (ref 31–37)
MCV RBC AUTO: 92.9 FL
MONOCYTES # BLD AUTO: 1.12 X10(3) UL (ref 0.1–1)
MONOCYTES NFR BLD AUTO: 9.6 %
NEUTROPHILS # BLD AUTO: 9 X10 (3) UL (ref 1.5–7.7)
NEUTROPHILS # BLD AUTO: 9 X10(3) UL (ref 1.5–7.7)
NEUTROPHILS NFR BLD AUTO: 76.8 %
NITRITE UR QL STRIP.AUTO: NEGATIVE
OSMOLALITY SERPL CALC.SUM OF ELEC: 287 MOSM/KG (ref 275–295)
PH UR STRIP.AUTO: 6 [PH] (ref 5–8)
PLATELET # BLD AUTO: 298 10(3)UL (ref 150–450)
POTASSIUM SERPL-SCNC: 3.6 MMOL/L (ref 3.5–5.1)
PROT SERPL-MCNC: 7.7 G/DL (ref 6.4–8.2)
PROT UR STRIP.AUTO-MCNC: 30 MG/DL
RBC # BLD AUTO: 4.64 X10(6)UL
RBC UR QL AUTO: NEGATIVE
SARS-COV-2 RNA RESP QL NAA+PROBE: NOT DETECTED
SODIUM SERPL-SCNC: 138 MMOL/L (ref 136–145)
SP GR UR STRIP.AUTO: 1.02 (ref 1–1.03)
UROBILINOGEN UR STRIP.AUTO-MCNC: <2 MG/DL
WBC # BLD AUTO: 11.7 X10(3) UL (ref 4–11)

## 2022-05-06 PROCEDURE — 96361 HYDRATE IV INFUSION ADD-ON: CPT

## 2022-05-06 PROCEDURE — 74177 CT ABD & PELVIS W/CONTRAST: CPT | Performed by: EMERGENCY MEDICINE

## 2022-05-06 PROCEDURE — 85025 COMPLETE CBC W/AUTO DIFF WBC: CPT | Performed by: EMERGENCY MEDICINE

## 2022-05-06 PROCEDURE — 99284 EMERGENCY DEPT VISIT MOD MDM: CPT

## 2022-05-06 PROCEDURE — 81001 URINALYSIS AUTO W/SCOPE: CPT | Performed by: EMERGENCY MEDICINE

## 2022-05-06 PROCEDURE — 83690 ASSAY OF LIPASE: CPT | Performed by: EMERGENCY MEDICINE

## 2022-05-06 PROCEDURE — 80053 COMPREHEN METABOLIC PANEL: CPT | Performed by: EMERGENCY MEDICINE

## 2022-05-06 PROCEDURE — 96360 HYDRATION IV INFUSION INIT: CPT

## 2022-05-06 RX ORDER — METRONIDAZOLE 500 MG/1
500 TABLET ORAL 3 TIMES DAILY
Qty: 30 TABLET | Refills: 0 | Status: SHIPPED | OUTPATIENT
Start: 2022-05-06 | End: 2022-05-16

## 2022-05-06 RX ORDER — IOHEXOL 350 MG/ML
80 INJECTION, SOLUTION INTRAVENOUS
Status: COMPLETED | OUTPATIENT
Start: 2022-05-06 | End: 2022-05-06

## 2022-05-06 RX ORDER — LEVOFLOXACIN 500 MG/1
500 TABLET, FILM COATED ORAL DAILY
Qty: 10 TABLET | Refills: 0 | Status: SHIPPED | OUTPATIENT
Start: 2022-05-06 | End: 2022-05-16

## 2022-05-06 NOTE — ED INITIAL ASSESSMENT (HPI)
Took dicylomine today at 0400. Patient arrives with burning in lower mid abdomen hx diverticulitis, states pain is similar. Describes puss and blood (dark) in stool.

## 2022-05-23 ENCOUNTER — OFFICE VISIT (OUTPATIENT)
Dept: FAMILY MEDICINE CLINIC | Facility: CLINIC | Age: 62
End: 2022-05-23
Payer: COMMERCIAL

## 2022-05-23 VITALS
DIASTOLIC BLOOD PRESSURE: 84 MMHG | WEIGHT: 165 LBS | RESPIRATION RATE: 16 BRPM | HEART RATE: 82 BPM | BODY MASS INDEX: 26.52 KG/M2 | SYSTOLIC BLOOD PRESSURE: 134 MMHG | OXYGEN SATURATION: 98 % | HEIGHT: 66 IN

## 2022-05-23 DIAGNOSIS — K57.92 DIVERTICULITIS: Primary | ICD-10-CM

## 2022-05-23 PROCEDURE — 3008F BODY MASS INDEX DOCD: CPT | Performed by: FAMILY MEDICINE

## 2022-05-23 PROCEDURE — 3075F SYST BP GE 130 - 139MM HG: CPT | Performed by: FAMILY MEDICINE

## 2022-05-23 PROCEDURE — 99213 OFFICE O/P EST LOW 20 MIN: CPT | Performed by: FAMILY MEDICINE

## 2022-05-23 PROCEDURE — 3079F DIAST BP 80-89 MM HG: CPT | Performed by: FAMILY MEDICINE

## 2022-09-13 ENCOUNTER — APPOINTMENT (OUTPATIENT)
Dept: URBAN - METROPOLITAN AREA CLINIC 249 | Age: 62
Setting detail: DERMATOLOGY
End: 2022-09-14

## 2022-09-13 DIAGNOSIS — Z87.2 PERSONAL HISTORY OF DISEASES OF THE SKIN AND SUBCUTANEOUS TISSUE: ICD-10-CM

## 2022-09-13 DIAGNOSIS — L30.4 ERYTHEMA INTERTRIGO: ICD-10-CM

## 2022-09-13 DIAGNOSIS — Z85.828 PERSONAL HISTORY OF OTHER MALIGNANT NEOPLASM OF SKIN: ICD-10-CM

## 2022-09-13 DIAGNOSIS — L57.0 ACTINIC KERATOSIS: ICD-10-CM

## 2022-09-13 PROCEDURE — 17000 DESTRUCT PREMALG LESION: CPT

## 2022-09-13 PROCEDURE — OTHER COUNSELING: OTHER

## 2022-09-13 PROCEDURE — 99213 OFFICE O/P EST LOW 20 MIN: CPT | Mod: 25

## 2022-09-13 PROCEDURE — OTHER LIQUID NITROGEN: OTHER

## 2022-09-13 ASSESSMENT — LOCATION ZONE DERM
LOCATION ZONE: TRUNK
LOCATION ZONE: FACE
LOCATION ZONE: LEG

## 2022-09-13 ASSESSMENT — LOCATION SIMPLE DESCRIPTION DERM
LOCATION SIMPLE: LEFT PRETIBIAL REGION
LOCATION SIMPLE: ABDOMEN
LOCATION SIMPLE: LEFT CHEEK

## 2022-09-13 ASSESSMENT — LOCATION DETAILED DESCRIPTION DERM
LOCATION DETAILED: LEFT CENTRAL MALAR CHEEK
LOCATION DETAILED: LEFT DISTAL PRETIBIAL REGION
LOCATION DETAILED: UMBILICUS

## 2022-09-13 NOTE — PROCEDURE: LIQUID NITROGEN
Detail Level: Detailed
Render Note In Bullet Format When Appropriate: No
Show Applicator Variable?: Yes
Duration Of Freeze Thaw-Cycle (Seconds): 5
Consent: The patient's consent was obtained including but not limited to risks of crusting, scabbing, blistering, scarring, darker or lighter pigmentary change, recurrence, incomplete removal and infection.
Post-Care Instructions: I reviewed with the patient in detail post-care instructions. Patient is to wear sunprotection, and avoid picking at any of the treated lesions. Pt may apply Vaseline to crusted or scabbing areas.
Application Tool (Optional): Cry-AC
Number Of Freeze-Thaw Cycles: 3 freeze-thaw cycles
Aperture Size (Optional): C

## 2022-09-16 ENCOUNTER — PATIENT MESSAGE (OUTPATIENT)
Dept: FAMILY MEDICINE CLINIC | Facility: CLINIC | Age: 62
End: 2022-09-16

## 2022-09-16 NOTE — TELEPHONE ENCOUNTER
From: Marina Guerra  To: Benny Coon, DO  Sent: 9/16/2022 9:16 AM CDT  Subject: Needed bloodwork? Just double checking on my previous message on the bloodwork order, as I can only get to it next Monday am. Otherwise my work schedule will preclude me from getting it done before my checkup.  Thanks

## 2022-09-19 ENCOUNTER — LAB ENCOUNTER (OUTPATIENT)
Dept: LAB | Age: 62
End: 2022-09-19
Attending: FAMILY MEDICINE

## 2022-09-19 DIAGNOSIS — E55.9 VITAMIN D DEFICIENCY: ICD-10-CM

## 2022-09-19 DIAGNOSIS — E03.9 HYPOTHYROIDISM, UNSPECIFIED TYPE: ICD-10-CM

## 2022-09-19 LAB
T4 FREE SERPL-MCNC: 1 NG/DL (ref 0.8–1.7)
TSI SER-ACNC: 0.83 MIU/ML (ref 0.36–3.74)
VIT D+METAB SERPL-MCNC: 57.4 NG/ML (ref 30–100)

## 2022-09-19 PROCEDURE — 84443 ASSAY THYROID STIM HORMONE: CPT

## 2022-09-19 PROCEDURE — 80061 LIPID PANEL: CPT | Performed by: FAMILY MEDICINE

## 2022-09-19 PROCEDURE — 86800 THYROGLOBULIN ANTIBODY: CPT | Performed by: FAMILY MEDICINE

## 2022-09-19 PROCEDURE — 85025 COMPLETE CBC W/AUTO DIFF WBC: CPT | Performed by: FAMILY MEDICINE

## 2022-09-19 PROCEDURE — 82607 VITAMIN B-12: CPT | Performed by: FAMILY MEDICINE

## 2022-09-19 PROCEDURE — 84481 FREE ASSAY (FT-3): CPT | Performed by: FAMILY MEDICINE

## 2022-09-19 PROCEDURE — 86376 MICROSOMAL ANTIBODY EACH: CPT | Performed by: FAMILY MEDICINE

## 2022-09-19 PROCEDURE — 82306 VITAMIN D 25 HYDROXY: CPT

## 2022-09-19 PROCEDURE — 84439 ASSAY OF FREE THYROXINE: CPT

## 2022-09-19 PROCEDURE — 80053 COMPREHEN METABOLIC PANEL: CPT | Performed by: FAMILY MEDICINE

## 2022-09-23 ENCOUNTER — OFFICE VISIT (OUTPATIENT)
Dept: FAMILY MEDICINE CLINIC | Facility: CLINIC | Age: 62
End: 2022-09-23

## 2022-09-23 VITALS
HEART RATE: 77 BPM | HEIGHT: 65.5 IN | SYSTOLIC BLOOD PRESSURE: 116 MMHG | DIASTOLIC BLOOD PRESSURE: 86 MMHG | BODY MASS INDEX: 27.49 KG/M2 | OXYGEN SATURATION: 98 % | RESPIRATION RATE: 16 BRPM | WEIGHT: 167 LBS

## 2022-09-23 DIAGNOSIS — Z23 NEED FOR VACCINATION: ICD-10-CM

## 2022-09-23 DIAGNOSIS — Z85.828 HISTORY OF SKIN CANCER: ICD-10-CM

## 2022-09-23 DIAGNOSIS — Z00.00 ANNUAL PHYSICAL EXAM: Primary | ICD-10-CM

## 2022-09-23 DIAGNOSIS — M54.9 CHRONIC BACK PAIN, UNSPECIFIED BACK LOCATION, UNSPECIFIED BACK PAIN LATERALITY: ICD-10-CM

## 2022-09-23 DIAGNOSIS — G89.29 CHRONIC BACK PAIN, UNSPECIFIED BACK LOCATION, UNSPECIFIED BACK PAIN LATERALITY: ICD-10-CM

## 2022-09-23 PROCEDURE — 99396 PREV VISIT EST AGE 40-64: CPT | Performed by: FAMILY MEDICINE

## 2022-09-23 PROCEDURE — 3008F BODY MASS INDEX DOCD: CPT | Performed by: FAMILY MEDICINE

## 2022-09-23 PROCEDURE — 3079F DIAST BP 80-89 MM HG: CPT | Performed by: FAMILY MEDICINE

## 2022-09-23 PROCEDURE — 90686 IIV4 VACC NO PRSV 0.5 ML IM: CPT | Performed by: FAMILY MEDICINE

## 2022-09-23 PROCEDURE — 3074F SYST BP LT 130 MM HG: CPT | Performed by: FAMILY MEDICINE

## 2022-09-23 PROCEDURE — 90471 IMMUNIZATION ADMIN: CPT | Performed by: FAMILY MEDICINE

## 2022-09-23 RX ORDER — METAXALONE 800 MG/1
TABLET ORAL
Qty: 30 TABLET | Refills: 0 | Status: SHIPPED | OUTPATIENT
Start: 2022-09-23

## 2023-03-31 ENCOUNTER — LAB ENCOUNTER (OUTPATIENT)
Dept: LAB | Age: 63
End: 2023-03-31
Attending: INTERNAL MEDICINE
Payer: COMMERCIAL

## 2023-03-31 DIAGNOSIS — E03.9 ACQUIRED HYPOTHYROIDISM: Primary | ICD-10-CM

## 2023-03-31 LAB
T3FREE SERPL-MCNC: 2.18 PG/ML (ref 2.4–4.2)
T4 FREE SERPL-MCNC: 1 NG/DL (ref 0.8–1.7)
THYROGLOB SERPL-MCNC: <15 U/ML (ref ?–60)
THYROPEROXIDASE AB SERPL-ACNC: 34 U/ML (ref ?–60)
TSI SER-ACNC: 0.61 MIU/ML (ref 0.36–3.74)

## 2023-03-31 PROCEDURE — 84443 ASSAY THYROID STIM HORMONE: CPT

## 2023-03-31 PROCEDURE — 84439 ASSAY OF FREE THYROXINE: CPT

## 2023-03-31 PROCEDURE — 84481 FREE ASSAY (FT-3): CPT

## 2023-03-31 PROCEDURE — 86800 THYROGLOBULIN ANTIBODY: CPT

## 2023-03-31 PROCEDURE — 86376 MICROSOMAL ANTIBODY EACH: CPT

## 2023-04-21 ENCOUNTER — LABORATORY ENCOUNTER (OUTPATIENT)
Dept: LAB | Age: 63
End: 2023-04-21
Attending: FAMILY MEDICINE
Payer: COMMERCIAL

## 2023-04-21 DIAGNOSIS — E03.9 HYPOTHYROIDISM: Primary | ICD-10-CM

## 2023-04-21 LAB
T3FREE SERPL-MCNC: 2.32 PG/ML (ref 2.4–4.2)
T4 FREE SERPL-MCNC: 1 NG/DL (ref 0.8–1.7)
TSI SER-ACNC: 0.57 MIU/ML (ref 0.36–3.74)

## 2023-04-21 PROCEDURE — 84439 ASSAY OF FREE THYROXINE: CPT

## 2023-04-21 PROCEDURE — 84443 ASSAY THYROID STIM HORMONE: CPT

## 2023-04-21 PROCEDURE — 84481 FREE ASSAY (FT-3): CPT

## 2023-05-01 ENCOUNTER — OFFICE VISIT (OUTPATIENT)
Facility: LOCATION | Age: 63
End: 2023-05-01
Payer: COMMERCIAL

## 2023-05-01 ENCOUNTER — LAB ENCOUNTER (OUTPATIENT)
Dept: LAB | Age: 63
End: 2023-05-01
Attending: FAMILY MEDICINE
Payer: COMMERCIAL

## 2023-05-01 VITALS — HEART RATE: 98 BPM | TEMPERATURE: 97 F

## 2023-05-01 DIAGNOSIS — K64.4 EXTERNAL PROLAPSED HEMORRHOIDS: ICD-10-CM

## 2023-05-01 DIAGNOSIS — E03.9 PRIMARY HYPOTHYROIDISM: Primary | ICD-10-CM

## 2023-05-01 DIAGNOSIS — Z87.19 HISTORY OF COLONIC DIVERTICULITIS: ICD-10-CM

## 2023-05-01 DIAGNOSIS — N81.6 RECTOCELE: ICD-10-CM

## 2023-05-01 DIAGNOSIS — K64.2 PROLAPSED INTERNAL HEMORRHOIDS, GRADE 3: Primary | ICD-10-CM

## 2023-05-01 LAB
T3FREE SERPL-MCNC: 2.15 PG/ML (ref 2.4–4.2)
T4 FREE SERPL-MCNC: 1 NG/DL (ref 0.8–1.7)
TSI SER-ACNC: 0.75 MIU/ML (ref 0.36–3.74)

## 2023-05-01 PROCEDURE — 46600 DIAGNOSTIC ANOSCOPY SPX: CPT | Performed by: COLON & RECTAL SURGERY

## 2023-05-01 PROCEDURE — 99203 OFFICE O/P NEW LOW 30 MIN: CPT | Performed by: COLON & RECTAL SURGERY

## 2023-05-01 PROCEDURE — 84443 ASSAY THYROID STIM HORMONE: CPT

## 2023-05-01 PROCEDURE — 84481 FREE ASSAY (FT-3): CPT

## 2023-05-01 PROCEDURE — 84439 ASSAY OF FREE THYROXINE: CPT

## 2023-05-03 PROBLEM — K64.4 EXTERNAL PROLAPSED HEMORRHOIDS: Status: ACTIVE | Noted: 2023-05-03

## 2023-05-03 PROBLEM — N81.6 RECTOCELE: Status: ACTIVE | Noted: 2023-05-03

## 2023-05-03 PROBLEM — K64.2 PROLAPSED INTERNAL HEMORRHOIDS, GRADE 3: Status: ACTIVE | Noted: 2023-05-03

## 2023-05-03 PROBLEM — Z87.19 HISTORY OF COLONIC DIVERTICULITIS: Status: ACTIVE | Noted: 2023-05-03

## 2023-05-15 ENCOUNTER — OFFICE VISIT (OUTPATIENT)
Facility: LOCATION | Age: 63
End: 2023-05-15
Payer: COMMERCIAL

## 2023-05-15 VITALS — HEART RATE: 91 BPM | TEMPERATURE: 97 F

## 2023-05-15 DIAGNOSIS — K64.2 PROLAPSED INTERNAL HEMORRHOIDS, GRADE 3: Primary | ICD-10-CM

## 2023-05-15 NOTE — PROCEDURES
Pre op diagnosis: Internal Hemorrhoids    Post op diagnosis: Same    Procedure: Anoscopy with O-ring Rubber Band Ligation of Internal Hemorrhoids    Surgeon: Savannah Kowalski MD    History of present illness: This patient has internal hemorrhoids that are symptomatic    Operative findings: The internal hemorrhoid was successfully ligated in the standard fashion with an O-ring ligator. Operative summary:  The patient was draped and exposed in the usual fashion. A medical assistant was present at all times. External visualization of the perineum and gluteal cleft was performed. Digital exam was performed with a well lubricated examining finger. Diagnostic Anoscopy was performed with lubrication. The anal canal was well visualized. An internal hemorrhoid was identified and well visualized. The internal hemorrhoid was grasped well above the dentate line with the grasper. The internal hemorrhoid was pulled within the O-ring ligator. The O-ring ligator was fired without complication. A 5% phenol solution was injected into the hemorrhoid and at its base. The patient tolerated the procedure and was observed in our office for at least 5 minutes prior to discharge. All findings are listed in the physical exam section of this note.

## 2023-05-15 NOTE — PATIENT INSTRUCTIONS
At today's office visit we treated right he-lateral internal hemorrhoid. At today's visit, the patient underwent an uncomplicated application of a rubber band and injection of a 5% phenol solution into the base of the rubberbanded hemorrhoid. The patient tolerated the procedure well. The patient remained stable throughout the entire procedure within my office. The patient was asked to recover in my office for a few minutes prior to leaving for home. The patient should refrain from extreme sports or athletic activity, including heavy lifting. We will see this patient again in 2-3 weeks for further care and treatment.

## 2023-05-26 ENCOUNTER — TELEPHONE (OUTPATIENT)
Facility: LOCATION | Age: 63
End: 2023-05-26

## 2023-05-26 NOTE — TELEPHONE ENCOUNTER
Called and spoke with the patient. She states the Friday following her banding she developed diarrhea. She states for 48 hours following this she had a fever with a Tmax of 100.4. She states after having diarrhea, she has had rectal pain. The pain is more severe when having a bowel movement. She has alternating diarrhea and constipation. She does see some bright red blood per rectum. She states she spoke with her gastroenterologist who ordered a stool sample. They do not believe that this is a diverticulitis attack. She does not currently have fevers or chills. She denies nausea or vomiting. I told the patient that after she provides a stool sample, she should start on Metamucil powder twice daily for her diarrhea. She should also call the on-call doctor over the weekend or head to the emergency department with any worsening fevers, chills, nausea, vomiting or pain. The patient expressed understanding with the above plan. All questions and concerns were addressed.

## 2023-06-16 ENCOUNTER — LAB ENCOUNTER (OUTPATIENT)
Dept: LAB | Age: 63
End: 2023-06-16
Attending: INTERNAL MEDICINE
Payer: COMMERCIAL

## 2023-06-16 DIAGNOSIS — I51.9 MYXEDEMA HEART DISEASE: Primary | ICD-10-CM

## 2023-06-16 DIAGNOSIS — E03.9 MYXEDEMA HEART DISEASE: Primary | ICD-10-CM

## 2023-06-16 LAB
T3FREE SERPL-MCNC: 2.72 PG/ML (ref 2.4–4.2)
T4 FREE SERPL-MCNC: 1.1 NG/DL (ref 0.8–1.7)
TSI SER-ACNC: 0.23 MIU/ML (ref 0.36–3.74)

## 2023-06-16 PROCEDURE — 84481 FREE ASSAY (FT-3): CPT

## 2023-06-16 PROCEDURE — 84439 ASSAY OF FREE THYROXINE: CPT

## 2023-06-16 PROCEDURE — 84443 ASSAY THYROID STIM HORMONE: CPT

## 2023-06-19 ENCOUNTER — OFFICE VISIT (OUTPATIENT)
Facility: LOCATION | Age: 63
End: 2023-06-19
Payer: COMMERCIAL

## 2023-06-19 VITALS — TEMPERATURE: 96 F | HEART RATE: 99 BPM

## 2023-06-19 DIAGNOSIS — K57.92 DIVERTICULITIS: Primary | ICD-10-CM

## 2023-06-19 DIAGNOSIS — K64.2 PROLAPSED INTERNAL HEMORRHOIDS, GRADE 3: ICD-10-CM

## 2023-06-19 PROCEDURE — 46221 LIGATION OF HEMORRHOID(S): CPT | Performed by: COLON & RECTAL SURGERY

## 2023-06-19 PROCEDURE — 99214 OFFICE O/P EST MOD 30 MIN: CPT | Performed by: COLON & RECTAL SURGERY

## 2023-06-19 NOTE — PATIENT INSTRUCTIONS
This patient had significant medical issues after her last rubber band application. Her last rubber band was a month ago. She states that few weeks after the rubber band she began with obstructive defecation and dysuria. She states that she called her gastroenterologist office and was told she had \"diverticulitis\" which is common for her. She has slight fever to 100.0. She felt constipated. She had trouble urinating. She had some anal and rectal pain. She is now normal.  She states that just a few days ago she got back to normal bowel habits. She at one point had the fever and diarrhea, then went to constipation. She did see intermittent bright red blood per rectum, but does not describe any beyond the normal amount after rubber band therapy. She does desire rubber band treatment today. Clinical exam including anoscopy reveals her to have no complications at the first rubber band site. That rubber band is gone. The mucosa is well-healed. There is no evidence of ruptured of an abscess or current fistula tract. Is not indurated. Is completely smooth. There is no external thrombosed hemorrhoid or remnant. The remaining hemorrhoids are in the left lateral right posterolateral positions. There is still grade 3. There is no proctitis or Crohn's disease. There is no fissure or fistula. I took the opportunity today to rubber band and inject the left lateral hemorrhoid. The injection was a 5% phenol solution. Patient tolerated procedure well. I counseled her that rubber banding should not precipitate diverticulitis. Rubber banding should not precipitate a fever unless there is an accompanying mass, infection, abscess, or cellulitis. Normally that would not resolve without antibiotics. Her gastroenterologist treated this \"diverticulitis\" with dicyclomine and Advil. She states she got good relief with that after a few days.     She did not get any CAT scan or other testing during her treatment of the diverticulitis. This would be her third episode of diverticulitis. She has a confusing pattern of possibly 2 different illnesses coincidentally occurring at the same time. At this point I told her I will be in town. She should report to me urgently for any problems or complications. If she gets trouble on the weekend I want her to proceed to the [facility] emergency department and have an anal exam.    If she feels she has diverticulitis again, she should undergo CT scan of the abdomen pelvis if the diverticulitis is acute enough to warrant therapy and treatment. We will proceed to complete her rubber band therapy as she has quite large hemorrhoids that are symptomatic and internal grade 3 in size.

## 2023-06-19 NOTE — PROCEDURES
Pre op diagnosis: Internal Hemorrhoids    Post op diagnosis: Same    Procedure: Anoscopy with O-ring Rubber Band Ligation of Internal Hemorrhoids    Surgeon: Tita Mistry MD    History of present illness: This patient has internal hemorrhoids that are symptomatic    Operative findings: The internal hemorrhoid was successfully ligated in the standard fashion with an O-ring ligator. Operative summary:  The patient was draped and exposed in the usual fashion. A medical assistant was present at all times. External visualization of the perineum and gluteal cleft was performed. Digital exam was performed with a well lubricated examining finger. Diagnostic Anoscopy was performed with lubrication. The anal canal was well visualized. An internal hemorrhoid was identified and well visualized. The internal hemorrhoid was grasped well above the dentate line with the grasper. The internal hemorrhoid was pulled within the O-ring ligator. The O-ring ligator was fired without complication. A 5% phenol solution was injected into the hemorrhoid and at its base. The patient tolerated the procedure and was observed in our office for at least 5 minutes prior to discharge. All findings are listed in the physical exam section of this note.

## 2023-06-26 ENCOUNTER — LAB REQUISITION (OUTPATIENT)
Dept: LAB | Facility: HOSPITAL | Age: 63
End: 2023-06-26
Payer: COMMERCIAL

## 2023-06-26 DIAGNOSIS — N95.0 POSTMENOPAUSAL BLEEDING: ICD-10-CM

## 2023-06-26 PROCEDURE — 88305 TISSUE EXAM BY PATHOLOGIST: CPT | Performed by: OBSTETRICS & GYNECOLOGY

## 2023-07-17 ENCOUNTER — OFFICE VISIT (OUTPATIENT)
Facility: LOCATION | Age: 63
End: 2023-07-17
Payer: COMMERCIAL

## 2023-07-17 VITALS — HEART RATE: 87 BPM | TEMPERATURE: 97 F

## 2023-07-17 DIAGNOSIS — K64.4 EXTERNAL PROLAPSED HEMORRHOIDS: ICD-10-CM

## 2023-07-17 DIAGNOSIS — K64.2 PROLAPSED INTERNAL HEMORRHOIDS, GRADE 3: Primary | ICD-10-CM

## 2023-07-17 PROCEDURE — 46221 LIGATION OF HEMORRHOID(S): CPT | Performed by: COLON & RECTAL SURGERY

## 2023-07-17 NOTE — PATIENT INSTRUCTIONS
At today's office visit we treated a right postero-lateral internal hemorrhoid. At today's visit, the patient underwent an uncomplicated application of a rubber band and injection of a 5% phenol solution into the base of the rubberbanded hemorrhoid. The patient tolerated the procedure well. The patient remained stable throughout the entire procedure within my office. The patient was asked to recover in my office for a few minutes prior to leaving for home. The patient should refrain from extreme sports or athletic activity, including heavy lifting. We will see this patient again in 2-3 weeks for further care and treatment.

## 2023-07-17 NOTE — PROCEDURES
Pre op diagnosis: Internal Hemorrhoids    Post op diagnosis: Same    Procedure: Anoscopy with O-ring Rubber Band Ligation of Internal Hemorrhoids    Surgeon: Cori Rice MD    History of present illness: This patient has internal hemorrhoids that are symptomatic    Operative findings: The internal hemorrhoid was successfully ligated in the standard fashion with an O-ring ligator. Operative summary:  The patient was draped and exposed in the usual fashion. A medical assistant was present at all times. External visualization of the perineum and gluteal cleft was performed. Digital exam was performed with a well lubricated examining finger. Diagnostic Anoscopy was performed with lubrication. The anal canal was well visualized. An internal hemorrhoid was identified and well visualized. The internal hemorrhoid was grasped well above the dentate line with the grasper. The internal hemorrhoid was pulled within the O-ring ligator. The O-ring ligator was fired without complication. A 5% phenol solution was injected into the hemorrhoid and at its base. The patient tolerated the procedure and was observed in our office for at least 5 minutes prior to discharge. All findings are listed in the physical exam section of this note.

## 2023-08-14 ENCOUNTER — OFFICE VISIT (OUTPATIENT)
Facility: LOCATION | Age: 63
End: 2023-08-14
Payer: COMMERCIAL

## 2023-08-14 DIAGNOSIS — K64.2 PROLAPSED INTERNAL HEMORRHOIDS, GRADE 3: Primary | ICD-10-CM

## 2023-08-14 PROCEDURE — 99214 OFFICE O/P EST MOD 30 MIN: CPT | Performed by: STUDENT IN AN ORGANIZED HEALTH CARE EDUCATION/TRAINING PROGRAM

## 2023-08-15 NOTE — PROGRESS NOTES
Follow Up Visit Note       Active Problems      No diagnosis found. Chief Complaint   Patient presents with:  Hemorrhoid Banding: EP/NP- R/S from Dr. Sherin Mazariegos, 4th Banding- pt is requesting for Opal COHEN to be present at the banding, pt denies rectal pain or bleeding         History of Present Illness  58-year-old female who is a patient of Dr. Sherin Mazariegos and follow-up with me for continuation of care of her hemorrhoidal disease. She has 3 prior banding sessions which banded all 3 columns of the hemorrhoidal disease. She reports no further bleeding since the second banding. She has no symptoms of mucus discharge hemorrhoidal protrusion or any other bothersome symptoms at this point. Allergies  Maria A Patterson is allergic to ciprofloxacin; mold; morphine; and tree, elm. Past Medical / Surgical / Social / Family History    The past medical and past surgical history have been reviewed by me today. Past Medical History:   Diagnosis Date    ADHD (attention deficit hyperactivity disorder) 8/15/2005    AR (allergic rhinitis) 8/15/2005    Asthma     BRCA1 negative     BRCA2 negative     Diverticulitis     10/2020, 2021    Diverticulosis 8/15/2008    DJD (degenerative joint disease)     Hashimoto's thyroiditis     Herniated disc, cervical     Lactose intolerance 8/15/2008    Migraine headache 8/15/1973    Primary osteoarthritis of right knee 2018    PVC (premature ventricular contraction) 2021    Spinal stenosis     CERVICAL MRI    Transfusion     tubal pregnancy     Past Surgical History:   Procedure Laterality Date    APPENDECTOMY        9966,8050    TWO, [address]     CHOLECYSTECTOMY      COLONOSCOPY      diverticulosis, and internal hemorrhoids    COLONOSCOPY      COLONOSCOPY N/A 3/22/2019    Procedure: COLONOSCOPY, POSSIBLE BIOPSY, POSSIBLE POLYPECTOMY 36086;  Surgeon: Rudy Schrader MD;  Location: [address] [place] [address]    HYSTEROSCOPY  2018    S&N, R/S of Endometrial Polyp    OTHER SURGICAL HISTORY  2010    BMT    OTHER SURGICAL HISTORY  1993    ECTOPIC  PREGNANCY REMOVAL    OTHER SURGICAL HISTORY  2010    ENDOSCOPIC  SINUS    OTHER SURGICAL HISTORY  2013    L  WRIST  PISIFORM-X  D/T  FX    TONSILLECTOMY         The family history and social history have been reviewed by me today. Family History   Problem Relation Age of Onset    Cancer Mother         NHL and pancreatic cancer    Hypertension Mother     Cancer Other         colon cancer in maternal aunt and maternal 1st cousin    Heart Disorder Father         1)PACER   2)AF  3)CHF    Hypertension Father     Other (Other) Daughter         1)CHURG-STRAUSSE  2)ASTHMA    Other (Other) Son         ASTHMA    Heart Disorder Maternal Grandfather         MI AT 77    Heart Disorder Paternal Grandfather         CHF    Hypertension Sister     Other (SLE) Sister     Other (HYPOTHYROID) Sister     Psychiatric Son         ASPERGER'S    Psychiatric Son         ADD    Hypertension Sister     Other (SJOGERN'S) Sister     Psychiatric Brother         ADHD     Breast Cancer Cousin 28        P 1st cousin- dx age 28    Breast Cancer Paternal Aunt 59        dx age- 59    Ovarian Cancer Paternal Cousin Female     Other (Pancreatic Cancer) Maternal Aunt         stage 4 metastatic     Social History    Socioeconomic History      Marital status:       Spouse name: JUAN      Number of children: 4      Years of education: PSY. D.    Occupational History      Occupation: NEUROPSYCHOLOGIST        Comment: SELF    Tobacco Use      Smoking status: Never      Smokeless tobacco: Never    Vaping Use      Vaping Use: Never used    Substance and Sexual Activity      Alcohol use: Yes        Alcohol/week: 0.0 standard drinks of alcohol        Comment: Occ glass of wine 2x/mo      Drug use: No        Comment: MARIJUANA 30 YRS. AGO W/O ABUSE/XS.       Sexual activity: Yes        Partners: Male    Other Topics      Concerns:         Service: No        Blood Transfusions: Yes          1993        Caffeine Concern: No          0-1 SERVING/D        Weight Concern: No        Special Diet: No        Exercise: Yes          QD, AEROBIXC        Bike Helmet: Yes        Seat Belt: Yes       Current Outpatient Medications:     Metaxalone 800 MG Oral Tab, TAKE ONE TABLET TWO TIMES A DAY AS NEEDED, Disp: 30 tablet, Rfl: 0    Lactobacillus (PROBIOTIC ACIDOPHILUS OR), Take by mouth., Disp: , Rfl:     metoprolol succinate 12.5 mg Oral Tab, Take 1 Partial Tablet (12.5 mg total) by mouth Daily Beta Blocker. Patient states she takes once a week, Disp: , Rfl:     estradiol 0.05 MG/24HR Transdermal Patch Biweekly, Place 1 patch onto the skin twice a week., Disp: 24 patch, Rfl: 4    progesterone 100 MG Oral Cap, Take 1 capsule (100 mg total) by mouth nightly., Disp: 90 capsule, Rfl: 4    Levalbuterol HCl 0.63 MG/3ML Inhalation Nebu Soln, as needed. , Disp: , Rfl:     Beta Glucan Does not apply Powder, Jason's Beta 1,3 Glucan 500 mg capsules Sig: Pt to take 1 capsule QD Dispense #90 with 3 refills, Disp: 90 Bottle, Rfl: 1    Magnesium Gluconate 250 MG Oral Tab, Take 750 mg by mouth., Disp: , Rfl:     omega-3 fatty acids (FISH OIL) 1000 MG Oral Cap, Take 1,000 mg by mouth 2 (two) times daily. , Disp: , Rfl:     Desloratadine (CLARINEX OR), Take 5 mg by mouth 2 (two) times daily. , Disp: , Rfl:     SYNTHROID 75 MCG Oral Tab, , Disp: , Rfl: 3    PULMICORT FLEXHALER 180 MCG/ACT Inhalation Aerosol Powder, Breath Activated, Inhale 2 puffs into the lungs 2 (two) times daily. , Disp: , Rfl:     Methylphenidate HCl ER 18 MG Oral Tab CR, Take by mouth 4 (four) times daily. , Disp: , Rfl:     aspirin 81 MG Oral Tab, Take 1 tablet (81 mg total) by mouth daily. , Disp: , Rfl:     Azelastine-Fluticasone 137-50 MCG/ACT Nasal Suspension, by Nasal route., Disp: , Rfl:     VITAMIN D OR, 5000 IU Daily, Disp: , Rfl:     MSM OR, Take 1,000 mg by mouth.  , Disp: , Rfl:     MULTI-VITAMIN OR, 1 tab daily, Disp: , Rfl:     VITAMIN C 500 MG OR TABS, 1 TABLET DAILY, Disp: , Rfl:     SELENIUM 200 MCG OR TABS, 2 qd, Disp: , Rfl:      Review of Systems  The Review of Systems has been reviewed by me during today. Review of Systems   Constitutional:  Negative for chills, diaphoresis, fatigue and fever. HENT:  Negative for ear discharge, ear pain and sore throat. Eyes:  Negative for pain and discharge. Respiratory:  Negative for cough, chest tightness and shortness of breath. Cardiovascular:  Negative for chest pain, palpitations and leg swelling. Gastrointestinal:  Negative for abdominal distention, abdominal pain, blood in stool, constipation, diarrhea, nausea and vomiting. Genitourinary:  Negative for dysuria, frequency, hematuria and urgency. Skin:  Negative for color change, pallor and rash. Neurological:  Negative for weakness, light-headedness, numbness and headaches. Hematological:  Negative for adenopathy. Does not bruise/bleed easily. Psychiatric/Behavioral:  Negative for agitation and confusion. Physical Findings   LMP 10/13/2007   Physical Exam  Constitutional:       Appearance: Normal appearance. HENT:      Head: Normocephalic and atraumatic. Cardiovascular:      Pulses: Normal pulses. Pulmonary:      Effort: Pulmonary effort is normal.   Genitourinary:     Comments: The perineum and perianal skin were examined. There was No abnormality. Digital rectal exam was performed. There was  good resting anal sphincter tone. Anoscopy was performed. The anal canal and anorectal ring were examined circumferentially. There was not anal fissure. There was not anal fistula internal opening. There was internal hemorrhoid enlargement Grade 3 internal hemorrhoids, the banding areas are completely healed without signs of infection or bleeding. Hemorrhoids did not have stigmata of bleeding at this time   There was not blood or mucus seen.         Skin:     General: Skin is warm. Capillary Refill: Capillary refill takes less than 2 seconds. Neurological:      Mental Status: She is alert and oriented to person, place, and time. Mental status is at baseline. Assessment   No diagnosis found. Monica De Anda is a 57year old female with internal hemorrhoids that are being treated with banding. I performed an anoscopy that showed grade 3 internal hemorrhoids that are not bleeding at this time. I discussed with her the treatment options. She had severe symptoms after her second banding with fever, anal pain and diarrhea. I am not sure if that is related to her banding since I did not evaluate her at the time. At any rate she has no symptoms at this time and seems to have improved significantly with the past 3 treatments. She has no complaints and thus elected to observe the remaining hemorrhoidal disease. If symptoms are to return then she can follow up with me for further banding or discussion for excisional hemorrhoidectomy . I have no further follow-up scheduled with this patient at this time. This patient can see me on an as-needed basis. This patient should return urgently for any problems or complications related to my surgical intervention. No orders of the defined types were placed in this encounter. Imaging & Referrals   None    Follow Up  No follow-ups on file.     Carter Brown MD

## 2023-08-23 ENCOUNTER — LAB ENCOUNTER (OUTPATIENT)
Dept: LAB | Age: 63
End: 2023-08-23
Attending: FAMILY MEDICINE
Payer: COMMERCIAL

## 2023-08-23 DIAGNOSIS — E03.9 MYXEDEMA HEART DISEASE: Primary | ICD-10-CM

## 2023-08-23 DIAGNOSIS — I51.9 MYXEDEMA HEART DISEASE: Primary | ICD-10-CM

## 2023-08-23 LAB
T3FREE SERPL-MCNC: 2.66 PG/ML (ref 2.4–4.2)
T4 FREE SERPL-MCNC: 0.9 NG/DL (ref 0.8–1.7)
TSI SER-ACNC: 0.59 MIU/ML (ref 0.36–3.74)

## 2023-08-23 PROCEDURE — 84439 ASSAY OF FREE THYROXINE: CPT

## 2023-08-23 PROCEDURE — 84481 FREE ASSAY (FT-3): CPT | Performed by: FAMILY MEDICINE

## 2023-08-23 PROCEDURE — 84443 ASSAY THYROID STIM HORMONE: CPT

## 2023-09-03 ENCOUNTER — APPOINTMENT (OUTPATIENT)
Dept: GENERAL RADIOLOGY | Age: 63
End: 2023-09-03
Attending: EMERGENCY MEDICINE
Payer: COMMERCIAL

## 2023-09-03 ENCOUNTER — HOSPITAL ENCOUNTER (OUTPATIENT)
Age: 63
Discharge: HOME | End: 2023-09-03
Attending: EMERGENCY MEDICINE
Payer: COMMERCIAL

## 2023-09-03 VITALS
DIASTOLIC BLOOD PRESSURE: 67 MMHG | WEIGHT: 165 LBS | HEART RATE: 94 BPM | OXYGEN SATURATION: 98 % | TEMPERATURE: 97 F | BODY MASS INDEX: 27 KG/M2 | SYSTOLIC BLOOD PRESSURE: 130 MMHG | RESPIRATION RATE: 20 BRPM

## 2023-09-03 DIAGNOSIS — M77.9 TENDONITIS: Primary | ICD-10-CM

## 2023-09-03 PROCEDURE — 99214 OFFICE O/P EST MOD 30 MIN: CPT

## 2023-09-03 PROCEDURE — 73630 X-RAY EXAM OF FOOT: CPT | Performed by: EMERGENCY MEDICINE

## 2023-09-03 PROCEDURE — 99213 OFFICE O/P EST LOW 20 MIN: CPT

## 2023-09-03 PROCEDURE — 73610 X-RAY EXAM OF ANKLE: CPT | Performed by: EMERGENCY MEDICINE

## 2023-09-03 RX ORDER — METHYLPREDNISOLONE 4 MG/1
TABLET ORAL
Qty: 1 EACH | Refills: 0 | Status: SHIPPED | OUTPATIENT
Start: 2023-09-03 | End: 2023-09-29 | Stop reason: ALTCHOICE

## 2023-09-03 RX ORDER — IBUPROFEN 600 MG/1
600 TABLET ORAL EVERY 8 HOURS PRN
Qty: 20 TABLET | Refills: 0 | Status: SHIPPED | OUTPATIENT
Start: 2023-09-03

## 2023-09-03 NOTE — DISCHARGE INSTRUCTIONS
Follow-up with your primary care doctor  Apply cold compresses to the affected area  Take ibuprofen for pain every 6 hours  Take Medrol Dosepak as directed   Advance activity as tolerated

## 2023-09-18 ENCOUNTER — PATIENT MESSAGE (OUTPATIENT)
Dept: FAMILY MEDICINE CLINIC | Facility: CLINIC | Age: 63
End: 2023-09-18

## 2023-09-18 DIAGNOSIS — E55.9 VITAMIN D DEFICIENCY: Primary | ICD-10-CM

## 2023-09-18 DIAGNOSIS — Z00.00 LABORATORY EXAM ORDERED AS PART OF ROUTINE GENERAL MEDICAL EXAMINATION: ICD-10-CM

## 2023-09-18 DIAGNOSIS — E53.8 VITAMIN B12 DEFICIENCY: ICD-10-CM

## 2023-09-18 NOTE — TELEPHONE ENCOUNTER
From: Sanam Buckley  To: Shree Cui  Sent: 9/18/2023 8:27 AM CDT  Subject: Need one more lab ordered    I am sorry to bother you, but  [employer] incorrectly assigned a T3 test you ordered last year for my checkup with you next week to my [facility] by [facility] endocrinologist, and despite acknowledging their error have not fixed the ordering doctor on the previous lab performed in August. Because my thyroid labs have been unstable, it is important to me that the T3 get re-checked when I go to get the labs done on the 25th for my checkup with you on the 28th. The T4 and TSH order is still open, but can you order another T2 for me so I can get that checked on the 25th as well? Thank you!

## 2023-09-25 ENCOUNTER — LAB ENCOUNTER (OUTPATIENT)
Dept: LAB | Age: 63
End: 2023-09-25
Attending: FAMILY MEDICINE
Payer: COMMERCIAL

## 2023-09-25 DIAGNOSIS — E55.9 VITAMIN D DEFICIENCY: ICD-10-CM

## 2023-09-25 LAB
ALBUMIN SERPL-MCNC: 3.9 G/DL (ref 3.4–5)
ALBUMIN/GLOB SERPL: 1 {RATIO} (ref 1–2)
ALP LIVER SERPL-CCNC: 54 U/L
ALT SERPL-CCNC: 31 U/L
ANION GAP SERPL CALC-SCNC: 5 MMOL/L (ref 0–18)
AST SERPL-CCNC: 16 U/L (ref 15–37)
BASOPHILS # BLD AUTO: 0.04 X10(3) UL (ref 0–0.2)
BASOPHILS NFR BLD AUTO: 0.6 %
BILIRUB SERPL-MCNC: 0.5 MG/DL (ref 0.1–2)
BUN BLD-MCNC: 18 MG/DL (ref 7–18)
CALCIUM BLD-MCNC: 9.3 MG/DL (ref 8.5–10.1)
CHLORIDE SERPL-SCNC: 107 MMOL/L (ref 98–112)
CHOLEST SERPL-MCNC: 229 MG/DL (ref ?–200)
CO2 SERPL-SCNC: 26 MMOL/L (ref 21–32)
CREAT BLD-MCNC: 0.9 MG/DL
EGFRCR SERPLBLD CKD-EPI 2021: 71 ML/MIN/1.73M2 (ref 60–?)
EOSINOPHIL # BLD AUTO: 0.07 X10(3) UL (ref 0–0.7)
EOSINOPHIL NFR BLD AUTO: 1 %
ERYTHROCYTE [DISTWIDTH] IN BLOOD BY AUTOMATED COUNT: 13.6 %
FASTING PATIENT LIPID ANSWER: NO
FASTING STATUS PATIENT QL REPORTED: NO
GLOBULIN PLAS-MCNC: 3.8 G/DL (ref 2.8–4.4)
GLUCOSE BLD-MCNC: 95 MG/DL (ref 70–99)
HCT VFR BLD AUTO: 43.9 %
HDLC SERPL-MCNC: 58 MG/DL (ref 40–59)
HGB BLD-MCNC: 14.2 G/DL
IMM GRANULOCYTES # BLD AUTO: 0.01 X10(3) UL (ref 0–1)
IMM GRANULOCYTES NFR BLD: 0.1 %
LDLC SERPL CALC-MCNC: 149 MG/DL (ref ?–100)
LYMPHOCYTES # BLD AUTO: 2.5 X10(3) UL (ref 1–4)
LYMPHOCYTES NFR BLD AUTO: 35.3 %
MCH RBC QN AUTO: 30 PG (ref 26–34)
MCHC RBC AUTO-ENTMCNC: 32.3 G/DL (ref 31–37)
MCV RBC AUTO: 92.6 FL
MONOCYTES # BLD AUTO: 0.63 X10(3) UL (ref 0.1–1)
MONOCYTES NFR BLD AUTO: 8.9 %
NEUTROPHILS # BLD AUTO: 3.84 X10 (3) UL (ref 1.5–7.7)
NEUTROPHILS # BLD AUTO: 3.84 X10(3) UL (ref 1.5–7.7)
NEUTROPHILS NFR BLD AUTO: 54.1 %
NONHDLC SERPL-MCNC: 171 MG/DL (ref ?–130)
OSMOLALITY SERPL CALC.SUM OF ELEC: 288 MOSM/KG (ref 275–295)
PLATELET # BLD AUTO: 289 10(3)UL (ref 150–450)
POTASSIUM SERPL-SCNC: 3.9 MMOL/L (ref 3.5–5.1)
PROT SERPL-MCNC: 7.7 G/DL (ref 6.4–8.2)
RBC # BLD AUTO: 4.74 X10(6)UL
SODIUM SERPL-SCNC: 138 MMOL/L (ref 136–145)
TRIGL SERPL-MCNC: 121 MG/DL (ref 30–149)
VIT B12 SERPL-MCNC: 1111 PG/ML (ref 193–986)
VIT D+METAB SERPL-MCNC: 48.6 NG/ML (ref 30–100)
VLDLC SERPL CALC-MCNC: 23 MG/DL (ref 0–30)
WBC # BLD AUTO: 7.1 X10(3) UL (ref 4–11)

## 2023-09-25 PROCEDURE — 80053 COMPREHEN METABOLIC PANEL: CPT | Performed by: FAMILY MEDICINE

## 2023-09-25 PROCEDURE — 82306 VITAMIN D 25 HYDROXY: CPT

## 2023-09-25 PROCEDURE — 80061 LIPID PANEL: CPT | Performed by: FAMILY MEDICINE

## 2023-09-25 PROCEDURE — 85025 COMPLETE CBC W/AUTO DIFF WBC: CPT | Performed by: FAMILY MEDICINE

## 2023-09-25 PROCEDURE — 82607 VITAMIN B-12: CPT | Performed by: FAMILY MEDICINE

## 2023-09-25 PROCEDURE — 83036 HEMOGLOBIN GLYCOSYLATED A1C: CPT | Performed by: FAMILY MEDICINE

## 2023-09-26 LAB
EST. AVERAGE GLUCOSE BLD GHB EST-MCNC: 120 MG/DL (ref 68–126)
HBA1C MFR BLD: 5.8 % (ref ?–5.7)

## 2023-09-29 ENCOUNTER — OFFICE VISIT (OUTPATIENT)
Dept: FAMILY MEDICINE CLINIC | Facility: CLINIC | Age: 63
End: 2023-09-29
Payer: COMMERCIAL

## 2023-09-29 VITALS
WEIGHT: 168 LBS | RESPIRATION RATE: 16 BRPM | HEIGHT: 65.5 IN | BODY MASS INDEX: 27.65 KG/M2 | HEART RATE: 92 BPM | SYSTOLIC BLOOD PRESSURE: 126 MMHG | DIASTOLIC BLOOD PRESSURE: 76 MMHG | OXYGEN SATURATION: 98 %

## 2023-09-29 DIAGNOSIS — K43.9 VENTRAL HERNIA WITHOUT OBSTRUCTION OR GANGRENE: ICD-10-CM

## 2023-09-29 DIAGNOSIS — Z00.00 ANNUAL PHYSICAL EXAM: Primary | ICD-10-CM

## 2023-09-29 DIAGNOSIS — M25.371 RIGHT ANKLE INSTABILITY: ICD-10-CM

## 2023-09-29 PROCEDURE — 3074F SYST BP LT 130 MM HG: CPT | Performed by: FAMILY MEDICINE

## 2023-09-29 PROCEDURE — 99396 PREV VISIT EST AGE 40-64: CPT | Performed by: FAMILY MEDICINE

## 2023-09-29 PROCEDURE — 3078F DIAST BP <80 MM HG: CPT | Performed by: FAMILY MEDICINE

## 2023-09-29 PROCEDURE — 3008F BODY MASS INDEX DOCD: CPT | Performed by: FAMILY MEDICINE

## 2023-10-06 ENCOUNTER — LAB ENCOUNTER (OUTPATIENT)
Dept: LAB | Age: 63
End: 2023-10-06
Attending: FAMILY MEDICINE
Payer: COMMERCIAL

## 2023-10-06 LAB
T3FREE SERPL-MCNC: 2.6 PG/ML (ref 2.4–4.2)
T4 FREE SERPL-MCNC: 0.9 NG/DL (ref 0.8–1.7)
THYROGLOB SERPL-MCNC: <15 U/ML (ref ?–60)
THYROPEROXIDASE AB SERPL-ACNC: 48 U/ML (ref ?–60)
TSI SER-ACNC: 0.57 MIU/ML (ref 0.36–3.74)

## 2023-10-06 PROCEDURE — 84439 ASSAY OF FREE THYROXINE: CPT | Performed by: FAMILY MEDICINE

## 2023-10-06 PROCEDURE — 84481 FREE ASSAY (FT-3): CPT | Performed by: FAMILY MEDICINE

## 2023-10-06 PROCEDURE — 84443 ASSAY THYROID STIM HORMONE: CPT | Performed by: FAMILY MEDICINE

## 2023-10-06 PROCEDURE — 86800 THYROGLOBULIN ANTIBODY: CPT | Performed by: FAMILY MEDICINE

## 2023-10-06 PROCEDURE — 86376 MICROSOMAL ANTIBODY EACH: CPT | Performed by: FAMILY MEDICINE

## 2023-10-27 ENCOUNTER — PATIENT MESSAGE (OUTPATIENT)
Dept: FAMILY MEDICINE CLINIC | Facility: CLINIC | Age: 63
End: 2023-10-27

## 2023-10-27 NOTE — TELEPHONE ENCOUNTER
From: Jus Duarte  To: Noah Cohen  Sent: 10/27/2023 11:46 AM CDT  Subject: MRI location    Hi. I have tried repeatedly to schedule my ankle MRI with [facility] as you suggested, but after waiting on hold for longer than 10 minutes each time only to be transferred to a mailbox that is full and not accepting messages, I am done trying to schedule with them. You should know they are impossible to schedule with. Anyone else you recommend?

## 2023-12-18 ENCOUNTER — LAB ENCOUNTER (OUTPATIENT)
Dept: LAB | Age: 63
End: 2023-12-18
Attending: FAMILY MEDICINE
Payer: COMMERCIAL

## 2023-12-18 DIAGNOSIS — M85.80 SAPHO SYNDROME  (HCC): ICD-10-CM

## 2023-12-18 DIAGNOSIS — I51.9 MYXEDEMA HEART DISEASE: Primary | ICD-10-CM

## 2023-12-18 DIAGNOSIS — M65.9 SAPHO SYNDROME  (HCC): ICD-10-CM

## 2023-12-18 DIAGNOSIS — M86.9 SAPHO SYNDROME  (HCC): ICD-10-CM

## 2023-12-18 DIAGNOSIS — L40.3 SAPHO SYNDROME  (HCC): ICD-10-CM

## 2023-12-18 DIAGNOSIS — L70.9 SAPHO SYNDROME  (HCC): ICD-10-CM

## 2023-12-18 DIAGNOSIS — E03.9 MYXEDEMA HEART DISEASE: Primary | ICD-10-CM

## 2023-12-18 LAB
T3FREE SERPL-MCNC: 2.27 PG/ML (ref 2.4–4.2)
T4 FREE SERPL-MCNC: 0.9 NG/DL (ref 0.8–1.7)
TSI SER-ACNC: 1.71 MIU/ML (ref 0.36–3.74)
VIT D+METAB SERPL-MCNC: 41.9 NG/ML (ref 30–100)

## 2023-12-18 PROCEDURE — 82306 VITAMIN D 25 HYDROXY: CPT

## 2023-12-18 PROCEDURE — 84443 ASSAY THYROID STIM HORMONE: CPT

## 2023-12-18 PROCEDURE — 84439 ASSAY OF FREE THYROXINE: CPT

## 2023-12-18 PROCEDURE — 84481 FREE ASSAY (FT-3): CPT

## 2024-02-19 ENCOUNTER — LAB ENCOUNTER (OUTPATIENT)
Dept: LAB | Age: 64
End: 2024-02-19
Attending: STUDENT IN AN ORGANIZED HEALTH CARE EDUCATION/TRAINING PROGRAM
Payer: COMMERCIAL

## 2024-02-19 ENCOUNTER — PATIENT MESSAGE (OUTPATIENT)
Facility: CLINIC | Age: 64
End: 2024-02-19

## 2024-02-19 ENCOUNTER — OFFICE VISIT (OUTPATIENT)
Facility: CLINIC | Age: 64
End: 2024-02-19
Payer: COMMERCIAL

## 2024-02-19 VITALS
HEART RATE: 88 BPM | SYSTOLIC BLOOD PRESSURE: 118 MMHG | DIASTOLIC BLOOD PRESSURE: 76 MMHG | OXYGEN SATURATION: 99 % | RESPIRATION RATE: 18 BRPM

## 2024-02-19 DIAGNOSIS — E55.9 VITAMIN D DEFICIENCY: Primary | ICD-10-CM

## 2024-02-19 DIAGNOSIS — E06.3 HYPOTHYROIDISM DUE TO HASHIMOTO'S THYROIDITIS: ICD-10-CM

## 2024-02-19 DIAGNOSIS — E03.8 HYPOTHYROIDISM DUE TO HASHIMOTO'S THYROIDITIS: ICD-10-CM

## 2024-02-19 DIAGNOSIS — M85.859 OSTEOPENIA OF NECK OF FEMUR, UNSPECIFIED LATERALITY: ICD-10-CM

## 2024-02-19 DIAGNOSIS — E04.1 THYROID NODULE: ICD-10-CM

## 2024-02-19 LAB
T3 SERPL-MCNC: 128 NG/DL (ref 60–181)
T4 FREE SERPL-MCNC: 0.8 NG/DL (ref 0.8–1.7)
TSI SER-ACNC: 1.49 MIU/ML (ref 0.36–3.74)

## 2024-02-19 PROCEDURE — 84480 ASSAY TRIIODOTHYRONINE (T3): CPT

## 2024-02-19 PROCEDURE — 84443 ASSAY THYROID STIM HORMONE: CPT

## 2024-02-19 PROCEDURE — 36415 COLL VENOUS BLD VENIPUNCTURE: CPT

## 2024-02-19 PROCEDURE — 99204 OFFICE O/P NEW MOD 45 MIN: CPT | Performed by: STUDENT IN AN ORGANIZED HEALTH CARE EDUCATION/TRAINING PROGRAM

## 2024-02-19 PROCEDURE — 84439 ASSAY OF FREE THYROXINE: CPT

## 2024-02-19 RX ORDER — LEVOTHYROXINE SODIUM 0.07 MG/1
75 TABLET ORAL
Qty: 90 TABLET | Refills: 1 | Status: SHIPPED | OUTPATIENT
Start: 2024-02-19 | End: 2024-02-19

## 2024-02-19 RX ORDER — LEVOTHYROXINE SODIUM 75 MCG
75 TABLET ORAL
Qty: 90 TABLET | Refills: 1 | Status: SHIPPED | OUTPATIENT
Start: 2024-02-19 | End: 2024-08-17

## 2024-02-19 RX ORDER — LIOTHYRONINE SODIUM 5 UG/1
5 TABLET ORAL DAILY
Qty: 90 TABLET | Refills: 1 | Status: SHIPPED | OUTPATIENT
Start: 2024-02-19 | End: 2024-08-17

## 2024-02-19 NOTE — PROGRESS NOTES
ENDOCRINOLOGY, DIABETES, & METABOLISM NOTE     Subjective:   Beulah Menard is a 63 year old female who presents regarding hypothyroidism and osteopenia.     Initial HPI 2/2024  Patient is a former patient of Dr. Tavarez and saw Dr. Patel (Ruth) on 9/2023.   She presents today to discuss her hypothyroidism 2/2 hashimotos and osteopenia.     Hashimotos Hypothyroidism:  Diagnosed with hypothyroidism in 2008 and then started on estrogen/progesterone since 2015 and notes TFTs have remained stable  COVID 1/2023; had post-covid symptoms and asthma/steroids for 2 months (ended around 3/2023)  Was on LT4 75mcg daily and added cytomel 5 mcg 6 days a week around 6/2/2023 with Dr. Tavarez  Around 12/2023 noted hair loss, constipation, mid day fatigue, word finding difficulty, dry skin, palpitations, muscle pain in her legs. Had labs done and FT3 low, recommended to increase Cytomel to 5 mcg 7 days a week  Notes improvement in her symptoms   She is currently on LT4 75 mcg + Cytomel 5 mcg daily without any symptoms     Osteopenia:  -2015 had FOOSH with fracture and surgery   -Takes vitamin d 10,000 and MSM (magnesium, calcium, glucosamine); unsure of dose   -Adequate dietary calcium  -Denies family hx of osteoporosis   -Denies hx of kidney stones, previous hx of calcium problems  -Denies excessive ETOH use  -Drinks tea daily   -Menopause was at age 48; currently on estrogen patch as above      History/Other:    Chief Complaint Reviewed and Verified  Nursing Notes Reviewed and   Verified  Tobacco Reviewed  Allergies Reviewed  Medications Reviewed    Problem List Reviewed  Medical History Reviewed  Surgical History   Reviewed  Family History Reviewed         Tobacco:  She has never smoked tobacco.    Current Outpatient Medications   Medication Sig Dispense Refill    Liothyronine Sodium (CYTOMEL OR) Take by mouth.      ibuprofen 600 MG Oral Tab Take 1 tablet (600 mg total) by mouth every 8 (eight) hours as needed  for Pain or Fever. 20 tablet 0    Lactobacillus (PROBIOTIC ACIDOPHILUS OR) Take by mouth.      estradiol 0.05 MG/24HR Transdermal Patch Biweekly Place 1 patch onto the skin twice a week. 24 patch 4    progesterone 100 MG Oral Cap Take 1 capsule (100 mg total) by mouth nightly. 90 capsule 4    Levalbuterol HCl 0.63 MG/3ML Inhalation Nebu Soln as needed.        Beta Glucan Does not apply Powder Jason's Beta 1,3 Glucan 500 mg capsules Sig: Pt to take 1 capsule QD Dispense #90 with 3 refills (Patient not taking: Reported on 9/3/2023) 90 Bottle 1    Magnesium Gluconate 250 MG Oral Tab Take 750 mg by mouth.      omega-3 fatty acids (FISH OIL) 1000 MG Oral Cap Take 1,000 mg by mouth 2 (two) times daily.      Desloratadine (CLARINEX OR) Take 5 mg by mouth 2 (two) times daily.        SYNTHROID 75 MCG Oral Tab   3    PULMICORT FLEXHALER 180 MCG/ACT Inhalation Aerosol Powder, Breath Activated Inhale 2 puffs into the lungs 2 (two) times daily. (Patient not taking: Reported on 9/29/2023)      aspirin 81 MG Oral Tab Take 1 tablet (81 mg total) by mouth daily.      Azelastine-Fluticasone 137-50 MCG/ACT Nasal Suspension by Nasal route.      VITAMIN D OR 5000 IU Daily      MSM OR Take 1,000 mg by mouth.        MULTI-VITAMIN OR 1 tab daily      VITAMIN C 500 MG OR TABS 1 TABLET DAILY      SELENIUM 200 MCG OR TABS 2 qd       Allergies   Allergen Reactions    Ciprofloxacin OTHER (SEE COMMENTS)     Cognitive changes with oral use    Mold     Morphine NAUSEA AND VOMITING    Tree, Elm UNKNOWN     All trees     Past Medical History:   Diagnosis Date    ADHD (attention deficit hyperactivity disorder) 8/15/2005    AR (allergic rhinitis) 8/15/2005    Asthma     BRCA1 negative     BRCA2 negative     Diverticulitis     10/2020, 1/2021    Diverticulosis 8/15/2008    DJD (degenerative joint disease)     Hashimoto's thyroiditis     Herniated disc, cervical     Lactose intolerance 8/15/2008    Migraine headache 8/15/1973    Primary osteoarthritis of  right knee 2018    PVC (premature ventricular contraction) 2021    Spinal stenosis     CERVICAL MRI    Transfusion     tubal pregnancy      Past Surgical History:   Procedure Laterality Date    APPENDECTOMY        ,    TWO, EACH TIME TWINS.    CHOLECYSTECTOMY      COLONOSCOPY      diverticulosis, and internal hemorrhoids    COLONOSCOPY      COLONOSCOPY N/A 3/22/2019    Procedure: COLONOSCOPY, POSSIBLE BIOPSY, POSSIBLE POLYPECTOMY 63280;  Surgeon: Clayton Ramirez MD;  [facility]    HYSTEROSCOPY  2018    S&N, R/S of Endometrial Polyp    OTHER SURGICAL HISTORY      BMT    OTHER SURGICAL HISTORY      ECTOPIC  PREGNANCY REMOVAL    OTHER SURGICAL HISTORY      ENDOSCOPIC  SINUS    OTHER SURGICAL HISTORY      L  WRIST  PISIFORM-X  D/T  FX    TONSILLECTOMY       Social History     Socioeconomic History    Marital status:      Spouse name: JUAN    Number of children: 4    Years of education: PSY. D.   Occupational History    Occupation: NEUROPSYCHOLOGIST     Comment: SELF   Tobacco Use    Smoking status: Never    Smokeless tobacco: Never   Vaping Use    Vaping Use: Never used   Substance and Sexual Activity    Alcohol use: Yes     Alcohol/week: 0.0 standard drinks of alcohol     Comment: Occ glass of wine 2x/mo    Drug use: No     Comment: MARIJUANA 30 YRS. AGO W/O ABUSE/XS.    Sexual activity: Yes     Partners: Male   Other Topics Concern     Service No    Blood Transfusions Yes     Comment:     Caffeine Concern No     Comment: 0-1 SERVING/D    Weight Concern No    Special Diet No    Exercise Yes     Comment: QD, AEROBIXC    Bike Helmet Yes    Seat Belt Yes   Social History Narrative    LIVES WITH SPOUSE AND TWO CHILDREN IN SINGLE FAMILY HOME.     Family History   Problem Relation Age of Onset    Cancer Mother         NHL and pancreatic cancer    Hypertension Mother     Cancer Other         colon cancer in  maternal aunt and maternal 1st cousin    Heart Disorder Father         1)PACER   2)AF  3)CHF    Hypertension Father     Other (Other) Daughter         1)CHURG-STRAUSSE  2)ASTHMA    Other (Other) Son         ASTHMA    Heart Disorder Maternal Grandfather         MI AT 66    Heart Disorder Paternal Grandfather         CHF    Hypertension Sister     Other (SLE) Sister     Other (HYPOTHYROID) Sister     Psychiatric Son         ASPERGER'S    Psychiatric Son         ADD    Hypertension Sister     Other (SJOGERN'S) Sister     Psychiatric Brother         ADHD     Breast Cancer Cousin 35        P 1st cousin- dx age 35    Breast Cancer Paternal Aunt 64        dx age- 64    Ovarian Cancer Paternal Cousin Female     Other (Pancreatic Cancer) Maternal Aunt         stage 4 metastatic         Review of Systems:  10 point ROS completed, refer to HPI for pertinent positives    Objective:   /76   Pulse 88   Resp 18   LMP 10/13/2007   SpO2 99%  Estimated body mass index is 27.53 kg/m² as calculated from the following:    Height as of 9/29/23: 5' 5.5\" (1.664 m).    Weight as of 9/29/23: 168 lb (76.2 kg).  General Appearance:  Alert, in no acute distress, well developed  Eyes:  normal conjunctivae, sclera  Ears/Nose/Mouth/Throat/Neck:  normal hearing, normal speech and no palpable thyroid nodules  Respiratory:  breathing comfortably on room air, clear to auscultation bilaterally  Cardiovascular:  regular rate and rhythm, no murmurs, S3 or S4, no peripheral edema  Psychiatric:  Oriented to person, place and time, appropriate mood & affect  Skin: Normal moisture and skin texture  Neuro: sensory grossly intact, motor grossly intact. normal gait.      Laboratory Data     Recent Labs: Labs reviewed in [facility] under lab tab on 2/19/2024. Interpretation: TFTs stable 10/2023 with low normal TSH, 12/2023 TSH WNL with low ft3    Component      Latest Ref Rng 3/31/2023 4/21/2023 5/1/2023 6/16/2023 8/23/2023   T4,Free (Direct)      0.8 - 1.7  ng/dL 1.0  1.0  1.0  1.1  0.9    TSH      0.358 - 3.740 mIU/mL 0.614  0.572  0.750  0.225 (L)  0.587    ANTI-THYROGLOBULIN      <60 U/mL <15        ANTI-THYROPEROXIDASE      <60 U/mL 34        T3 FREE      2.40 - 4.20 pg/mL 2.18 (L)  2.32 (L)  2.15 (L)  2.72  2.66    VITAMIN D, 25-OH, TOTAL      30.0 - 100.0 ng/mL          Component      Latest Ref [facility] 10/6/2023 12/18/2023   T4,Free (Direct)      0.8 - 1.7 ng/dL 0.9  0.9    TSH      0.358 - 3.740 mIU/mL 0.570  1.710    ANTI-THYROGLOBULIN      <60 U/mL <15     ANTI-THYROPEROXIDASE      <60 U/mL 48     T3 FREE      2.40 - 4.20 pg/mL 2.60  2.27 (L)    VITAMIN D, 25-OH, TOTAL      30.0 - 100.0 ng/mL  41.9         Imaging     Radiology: Personally reviewed on 2/19/2024  I reviewed the patient's records from outside facility: osteopenia of femoral neck  7/5/2023 DXA    Bone Density:   -----------------------------------------------------------------   Region                  BMD    T-score  Z-score   Classification   -----------------------------------------------------------------   AP Spine (L1, L2, L3)         1.010   -0.1      1.6     Normal   Femoral Neck (Left)      0.706   -1.3      0.2     Osteopenia   Total Hip (Left)         0.902   -0.3      0.9     Normal   -----------------------------------------------------------------     4/2021 THYROID US  FINDINGS:  Right thyroid lobe:  Measures 4.6 x 1.2 x 1.4 cm (length x transverse x transverse).  Previously described a subcentimeter hyperechoic nodule in the posterior  interpolar region is today only vaguely suggested, poorly delineated from the  surrounding markedly heterogeneous parenchyma, but without significant  alteration in size or appearance when accounting for different equipment.  The thyroid isthmus measures 0.3 cm in thickness.     Left thyroid lobe:  Measures 4.4 x 1.0 x 1.3 cm (length x transverse x transverse).  Interpolar hypoechoic 0.7 cm nodule is also less conspicuous on today's exam.  Diffusely  heterogeneous appearance of the parenchyma is unchanged. There are no  new dominant lesions.  There is no regional cervical lymphadenopathy by size criteria, including  incidentally noted level 2B left lymph node and small lymph node inferior to the  left lower thyroid pole both measuring less than a centimeter in short axis.    IMPRESSION:  Overall, there is no significant alteration when compared to prior studies  dating back to 10/14/2015.    ASSESSMENT/PLAN   Assessment & Plan:     Beulah Menard is a 63 year old female who presented to clinic for:    Hypothyroidism due to Hashimoto's thyroiditis  Pathophysiology of condition, goals of therapy,  d/w pt in detail.   Clinical significance of thyroid testing discussed, including goal of biochemical euthyroidism as determined by TSH level  Patient is currently clinically euthyroid   Repeat TFTs now and prior to follow up in 6 months  Continue Synthroid 75 mcg x 6 days/week + Cytomel 5 mcg daily  -     TSH and Free T4; Future; Expected date: 02/19/2024  -     Triiodothyronine (T3) Total; Future; Expected date: 02/19/2024  -     TSH and Free T4; Future; Expected date: 07/29/2024  -     Triiodothyronine (T3) Total; Future; Expected date: 07/29/2024  -     Liothyronine Sodium; Take 1 tablet (5 mcg total) by mouth daily.  Dispense: 90 tablet; Refill: 1  -     Discontinue: Levothyroxine Sodium; Take 1 tablet (75 mcg total) by mouth before breakfast.  Dispense: 90 tablet; Refill: 1    Thyroid nodules  Discussed that patient has had stable nodules on US with thyroid US follow up from 2394-2186  Discussed repeating US since it has been 2 years vs. Obtaining imaging if symptoms arise   Will repeat thyroid US and if it remains stable, no need for follow up   -     US THYROID (CPT=76536); Future; Expected date: 02/19/2024    Osteopenia  FRAX below treatment threshold on DXA 7/2023  Patient to continue current vitamin D supplementation with goal vitamin D above 30  Patient  to continue 1200 mg of calcium intake (supplement + diet) daily  Discussed fall precautions and weight-bearing exercise       Return in about 6 months (around 8/19/2024) for thyroid nodule, hypothyroidism, and osteopenia .    A total of 45 minutes was spent today on pre-charting, obtaining history, reviewing pertinent labs, evaluating patient, providing multiple treatment options, reinforcing diet/exercise and compliance, and completing documentation.      Courtney Oliver DO, 2/19/2024

## 2024-02-19 NOTE — PATIENT INSTRUCTIONS
Return Visit   [ x ] Physician in 6 months   [  ] In person or video visit  [  ] In person only    [ x ] After visit summary   [ x ] Placed labs/imaging.    [ x ] Central scheduling # for ultrasound/nuclear med/CT/MRI/DXA  [ x ] Directions to 1st floor lab     It was great seeing you today!    Today we discussed your thyroid and your osteopenia:  -We reviewed your history  -We will repeat your labs for thyroid today  -We reviewed your bone density and you have a low fracture risk based on the FRAX assessment. Continue calcium and vitamin d supplements as well as fall precautions  -I have ordered a thyroid US to follow your 2 thyroid sub centimeter nodules. Your last US was in 2020 and was stable.     Take care!  -Dr. Oliver

## 2024-02-19 NOTE — TELEPHONE ENCOUNTER
Patient messaged in stating she needs Synthroid name brand- pended amended Rx and routed for review.

## 2024-02-19 NOTE — TELEPHONE ENCOUNTER
From: Beulah Menard  To: Courtney Oliver  Sent: 2/19/2024 11:46 AM CST  Subject: I forgot--I need Synthroid not generic    I am sorry, I forgot to inform you during my appointment with you today that I need name brand Synthroid. The generic levothyroxine does not work as well for me. Sorry for the oversight.

## 2024-03-01 ENCOUNTER — HOSPITAL ENCOUNTER (OUTPATIENT)
Dept: ULTRASOUND IMAGING | Age: 64
Discharge: HOME | End: 2024-03-01
Attending: STUDENT IN AN ORGANIZED HEALTH CARE EDUCATION/TRAINING PROGRAM
Payer: COMMERCIAL

## 2024-03-01 DIAGNOSIS — E04.1 THYROID NODULE: ICD-10-CM

## 2024-03-01 PROCEDURE — 76536 US EXAM OF HEAD AND NECK: CPT | Performed by: STUDENT IN AN ORGANIZED HEALTH CARE EDUCATION/TRAINING PROGRAM

## 2024-04-15 ENCOUNTER — HOSPITAL ENCOUNTER (EMERGENCY)
Facility: HOSPITAL | Age: 64
Discharge: HOME | End: 2024-04-15
Attending: EMERGENCY MEDICINE
Payer: COMMERCIAL

## 2024-04-15 VITALS
TEMPERATURE: 98 F | WEIGHT: 168 LBS | HEIGHT: 66 IN | SYSTOLIC BLOOD PRESSURE: 161 MMHG | HEART RATE: 73 BPM | DIASTOLIC BLOOD PRESSURE: 114 MMHG | RESPIRATION RATE: 18 BRPM | BODY MASS INDEX: 27 KG/M2 | OXYGEN SATURATION: 99 %

## 2024-04-15 DIAGNOSIS — R04.0 EPISTAXIS: Primary | ICD-10-CM

## 2024-04-15 PROCEDURE — 30903 CONTROL OF NOSEBLEED: CPT

## 2024-04-15 PROCEDURE — 99284 EMERGENCY DEPT VISIT MOD MDM: CPT

## 2024-04-15 PROCEDURE — 99283 EMERGENCY DEPT VISIT LOW MDM: CPT

## 2024-04-15 RX ORDER — TRANEXAMIC ACID 100 MG/ML
5 INJECTION, SOLUTION INTRAVENOUS ONCE
Status: COMPLETED | OUTPATIENT
Start: 2024-04-15 | End: 2024-04-15

## 2024-04-15 RX ORDER — CEFDINIR 300 MG/1
300 CAPSULE ORAL 2 TIMES DAILY
Qty: 14 CAPSULE | Refills: 0 | Status: SHIPPED | OUTPATIENT
Start: 2024-04-15 | End: 2024-04-22

## 2024-04-15 NOTE — ED INITIAL ASSESSMENT (HPI)
Pt in via EMS from home. Pt had an episode of a nosebleed yesterday. Pt packed her nose and it stopped after 10 minutes. Pt started having another one today that started around 1710 and has continued since. Pt reports blood dripping down the back of her throat and some that came out of her left eye.

## 2024-04-15 NOTE — ED PROVIDER NOTES
Patient Seen in: [facility] Emergency Department      History     Chief Complaint   Patient presents with    Nose Bleed     Stated Complaint: nose bleed    Subjective:   HPI    Patient is 63-year-old female who states at 5:10 PM she had acute onset of left nostril nosebleed.  Patient states did have a nosebleed yesterday but stopped after she packed it.  Patient states blood was coming up her lacrimal duct she feels it may have been a eustachian tube as well and tripped on the back of her throat.  Patient does have clip on and seems to have slowed significantly at this time.  Patient states he does not normally get nosebleeds, not on any blood thinners.  No easy bruising.  Remainder of review of systems negative.    Objective:   Past Medical History:    ADHD (attention deficit hyperactivity disorder)    AR (allergic rhinitis)    Asthma (HCC)    BRCA1 negative    BRCA2 negative    Diverticulitis    10/2020, 2021    Diverticulosis    DJD (degenerative joint disease)    Hashimoto's thyroiditis    Herniated disc, cervical    Lactose intolerance    Migraine headache    Primary osteoarthritis of right knee    PVC (premature ventricular contraction)    Spinal stenosis    CERVICAL MRI    Transfusion    tubal pregnancy              Past Surgical History:   Procedure Laterality Date    Appendectomy        ,    TWO, EACH TIME TWINS.    Cholecystectomy      Colonoscopy      diverticulosis, and internal hemorrhoids    Colonoscopy  2009    Colonoscopy N/A 3/22/2019    Procedure: COLONOSCOPY, POSSIBLE BIOPSY, POSSIBLE POLYPECTOMY 62611;  Surgeon: Clayton Ramirez MD;  Location: [facility], [facility]    Hysteroscopy  2018    S&N, R/S of Endometrial Polyp    Other surgical history      BMT    Other surgical history      ECTOPIC  PREGNANCY REMOVAL    Other surgical history      ENDOSCOPIC  SINUS    Other surgical history      L  WRIST  PISIFORM-X  D/T  FX    Tonsillectomy                   Social History     Socioeconomic History    Marital status:      Spouse name: JUAN    Number of children: 4    Years of education: PSY. D.   Occupational History    Occupation: NEUROPSYCHOLOGIST     Comment: SELF   Tobacco Use    Smoking status: Never    Smokeless tobacco: Never   Vaping Use    Vaping status: Never Used   Substance and Sexual Activity    Alcohol use: Yes     Alcohol/week: 0.0 standard drinks of alcohol     Comment: Occ glass of wine 2x/mo    Drug use: No     Comment: MARIJUANA 30 YRS. AGO W/O ABUSE/XS.    Sexual activity: Yes     Partners: Male   Other Topics Concern     Service No    Blood Transfusions Yes     Comment: 1993    Caffeine Concern No     Comment: 0-1 SERVING/D    Weight Concern No    Special Diet No    Exercise Yes     Comment: QD, AEROBIXC    Bike Helmet Yes    Seat Belt Yes   Social History Narrative    LIVES WITH SPOUSE AND TWO CHILDREN IN SINGLE FAMILY HOME.              Review of Systems    Positive for stated complaint: nose bleed  Other systems are as noted in HPI.  Constitutional and vital signs reviewed.      All other systems reviewed and negative except as noted above.    Physical Exam     ED Triage Vitals   BP 04/15/24 1820 (!) 172/77   Pulse 04/15/24 1817 100   Resp 04/15/24 1817 16   Temp 04/15/24 1816 98.2 °F (36.8 °C)   Temp src 04/15/24 1816 Temporal   SpO2 04/15/24 1817 98 %   O2 Device --        Current:BP (!) 172/77   Pulse 100   Temp 98.2 °F (36.8 °C) (Temporal)   Resp 16   Ht 167.6 cm (5' 6\")   Wt 76.2 kg   LMP 10/13/2007   SpO2 98%   BMI 27.12 kg/m²         Physical Exam  GENERAL: Patient resting on the cart in no acute distress.  HEENT: Extraocular muscles intact, pupils equal round reactive to light and accommodation.  Mouth small amount of blood in the posterior pharynx, neck supple, no meningismus.  Patient has some bleeding from the left nostril with clot present in the nostril.  LUNGS: Lungs clear to auscultation  bilaterally.  CARDIOVASCULAR: + S1-S2, regular rate and rhythm, no murmurs.  SKIN: No rash, good turgor.  NEURO: Patient answers questions appropriately.  No focal deficits appreciated.           ED Course   Labs Reviewed - No data to display                   MDM      Patient attempted to gently clear the blood from the nose.  Patient had let placed in the nose.  Patient did not have any anterior bleeding noted.  I did discuss options with patient, we will proceed with rapid Rhino anterior packing.  Patient had rapid Rhino placed without complication.  Patient was ambulatory no further bleeding.  Patient felt comfortable going home.  Patient has been on Omnicef for sinus infection.  Patient has 1 pill left will be given additional 7 days until follow-up with her ENT.  Recommend follow-up with ENT in 3 days for packing removal.  Rest, plenty of fluids.  Return if new or worse symptoms.  I did consider sinus infection, epistaxis.                                   Medical Decision Making      Disposition and Plan     Clinical Impression:  No diagnosis found.     Disposition:  There is no disposition on file for this visit.  There is no disposition time on file for this visit.    Follow-up:  No follow-up provider specified.        Medications Prescribed:  Current Discharge Medication List

## 2024-04-16 NOTE — DISCHARGE INSTRUCTIONS
Follow-up for further evaluation with ENT for packing removal in 3 days.  Return if new or worse symptoms.  Rest, plenty fluids.  No strenuous exertion.  Antibiotics as prescribed.

## 2024-05-02 ENCOUNTER — LAB ENCOUNTER (OUTPATIENT)
Dept: LAB | Age: 64
End: 2024-05-02
Attending: FAMILY MEDICINE
Payer: COMMERCIAL

## 2024-05-02 ENCOUNTER — OFFICE VISIT (OUTPATIENT)
Dept: FAMILY MEDICINE CLINIC | Facility: CLINIC | Age: 64
End: 2024-05-02
Payer: COMMERCIAL

## 2024-05-02 VITALS
SYSTOLIC BLOOD PRESSURE: 128 MMHG | WEIGHT: 168 LBS | OXYGEN SATURATION: 99 % | RESPIRATION RATE: 20 BRPM | DIASTOLIC BLOOD PRESSURE: 70 MMHG | HEIGHT: 66 IN | BODY MASS INDEX: 27 KG/M2 | HEART RATE: 78 BPM

## 2024-05-02 DIAGNOSIS — R55 NEAR SYNCOPE: ICD-10-CM

## 2024-05-02 DIAGNOSIS — J32.4 CHRONIC PANSINUSITIS: ICD-10-CM

## 2024-05-02 DIAGNOSIS — H53.9 VISION CHANGES: Primary | ICD-10-CM

## 2024-05-02 LAB
ALBUMIN SERPL-MCNC: 3.9 G/DL (ref 3.4–5)
ALBUMIN/GLOB SERPL: 1.1 {RATIO} (ref 1–2)
ALP LIVER SERPL-CCNC: 60 U/L
ALT SERPL-CCNC: 35 U/L
ANION GAP SERPL CALC-SCNC: 5 MMOL/L (ref 0–18)
AST SERPL-CCNC: 20 U/L (ref 15–37)
BASOPHILS # BLD AUTO: 0.04 X10(3) UL (ref 0–0.2)
BASOPHILS NFR BLD AUTO: 0.6 %
BILIRUB SERPL-MCNC: 0.7 MG/DL (ref 0.1–2)
BUN BLD-MCNC: 17 MG/DL (ref 9–23)
CALCIUM BLD-MCNC: 9.1 MG/DL (ref 8.5–10.1)
CHLORIDE SERPL-SCNC: 107 MMOL/L (ref 98–112)
CO2 SERPL-SCNC: 27 MMOL/L (ref 21–32)
CREAT BLD-MCNC: 1.01 MG/DL
DEPRECATED HBV CORE AB SER IA-ACNC: 77.2 NG/ML
EGFRCR SERPLBLD CKD-EPI 2021: 62 ML/MIN/1.73M2 (ref 60–?)
EOSINOPHIL # BLD AUTO: 0.08 X10(3) UL (ref 0–0.7)
EOSINOPHIL NFR BLD AUTO: 1.2 %
ERYTHROCYTE [DISTWIDTH] IN BLOOD BY AUTOMATED COUNT: 14.2 %
FASTING STATUS PATIENT QL REPORTED: YES
GLOBULIN PLAS-MCNC: 3.7 G/DL (ref 2.8–4.4)
GLUCOSE BLD-MCNC: 106 MG/DL (ref 70–99)
HCT VFR BLD AUTO: 44.2 %
HGB BLD-MCNC: 14.1 G/DL
IMM GRANULOCYTES # BLD AUTO: 0.02 X10(3) UL (ref 0–1)
IMM GRANULOCYTES NFR BLD: 0.3 %
LYMPHOCYTES # BLD AUTO: 2.35 X10(3) UL (ref 1–4)
LYMPHOCYTES NFR BLD AUTO: 34.9 %
MCH RBC QN AUTO: 30.4 PG (ref 26–34)
MCHC RBC AUTO-ENTMCNC: 31.9 G/DL (ref 31–37)
MCV RBC AUTO: 95.3 FL
MONOCYTES # BLD AUTO: 0.73 X10(3) UL (ref 0.1–1)
MONOCYTES NFR BLD AUTO: 10.8 %
NEUTROPHILS # BLD AUTO: 3.52 X10 (3) UL (ref 1.5–7.7)
NEUTROPHILS # BLD AUTO: 3.52 X10(3) UL (ref 1.5–7.7)
NEUTROPHILS NFR BLD AUTO: 52.2 %
OSMOLALITY SERPL CALC.SUM OF ELEC: 290 MOSM/KG (ref 275–295)
PLATELET # BLD AUTO: 353 10(3)UL (ref 150–450)
POTASSIUM SERPL-SCNC: 4.3 MMOL/L (ref 3.5–5.1)
PROT SERPL-MCNC: 7.6 G/DL (ref 6.4–8.2)
RBC # BLD AUTO: 4.64 X10(6)UL
SODIUM SERPL-SCNC: 139 MMOL/L (ref 136–145)
T3FREE SERPL-MCNC: 2.94 PG/ML (ref 2.4–4.2)
T4 FREE SERPL-MCNC: 1 NG/DL (ref 0.8–1.7)
TSI SER-ACNC: 1.32 MIU/ML (ref 0.36–3.74)
WBC # BLD AUTO: 6.7 X10(3) UL (ref 4–11)

## 2024-05-02 PROCEDURE — 84443 ASSAY THYROID STIM HORMONE: CPT

## 2024-05-02 PROCEDURE — 36415 COLL VENOUS BLD VENIPUNCTURE: CPT

## 2024-05-02 PROCEDURE — 84439 ASSAY OF FREE THYROXINE: CPT

## 2024-05-02 PROCEDURE — 99214 OFFICE O/P EST MOD 30 MIN: CPT | Performed by: FAMILY MEDICINE

## 2024-05-02 PROCEDURE — 84481 FREE ASSAY (FT-3): CPT

## 2024-05-02 PROCEDURE — 80053 COMPREHEN METABOLIC PANEL: CPT

## 2024-05-02 PROCEDURE — 85025 COMPLETE CBC W/AUTO DIFF WBC: CPT

## 2024-05-02 PROCEDURE — 82728 ASSAY OF FERRITIN: CPT

## 2024-05-02 RX ORDER — LIOTHYRONINE SODIUM 5 UG/1
TABLET ORAL
COMMUNITY
Start: 2023-05-03

## 2024-05-02 RX ORDER — DESLORATADINE 5 MG/1
5 TABLET ORAL 2 TIMES DAILY
COMMUNITY
Start: 2024-03-19

## 2024-05-02 RX ORDER — SERDEXMETHYLPHENIDATE AND DEXMETHYLPHENIDATE 10.4; 52.3 MG/1; MG/1
CAPSULE ORAL
COMMUNITY
Start: 2023-03-01

## 2024-05-02 RX ORDER — SERDEXMETHYLPHENIDATE AND DEXMETHYLPHENIDATE 5.2; 26.1 MG/1; MG/1
CAPSULE ORAL
COMMUNITY
Start: 2023-03-01

## 2024-05-02 NOTE — PROGRESS NOTES
HPI:   Beulah Menard is a 63 year old female who presents     H/o chronic sinusitis - sees Dr. Hernandez  Pt takes omnicef as needed     Pt started to feel like she falling forward with some tunnel vision  Did see Dr. Hernandez   3 weeks ago - pt had a severe right nares nosebleed // blood was coming out of her eye // pt was on day 14 of omnicef     Nose was packed by ER   Packing was removed by ENT // saw Dr. Hernandez     Was sent to Ophtho yesterday - Dr. Oquendo and had a normal eye exam     Using dymsta -   Using saline spray     Pt has been near syncopal -- occurs when walking // feels like everything is closing in     Fatigued / taking naps     Feels fine with exercise       Current Outpatient Medications   Medication Sig Dispense Refill    AZSTARYS 52.3-10.4 MG Oral Cap       AZSTARYS 26.1-5.2 MG Oral Cap       desloratadine 5 MG Oral Tab Take 1 tablet (5 mg total) by mouth 2 (two) times daily.      liothyronine 5 MCG Oral Tab       liothyronine (CYTOMEL) 5 MCG Oral Tab Take 1 tablet (5 mcg total) by mouth daily. 90 tablet 1    SYNTHROID 75 MCG Oral Tab Take 1 tablet (75 mcg total) by mouth before breakfast. 90 tablet 1    Lactobacillus (PROBIOTIC ACIDOPHILUS OR) Take by mouth.      estradiol 0.05 MG/24HR Transdermal Patch Biweekly Place 1 patch onto the skin twice a week. 24 patch 4    progesterone 100 MG Oral Cap Take 1 capsule (100 mg total) by mouth nightly. 90 capsule 4    Levalbuterol HCl 0.63 MG/3ML Inhalation Nebu Soln as needed.        Magnesium Gluconate 250 MG Oral Tab Take 750 mg by mouth.      omega-3 fatty acids (FISH OIL) 1000 MG Oral Cap Take 1,000 mg by mouth 2 (two) times daily.      Desloratadine (CLARINEX OR) Take 5 mg by mouth 2 (two) times daily.        PULMICORT FLEXHALER 180 MCG/ACT Inhalation Aerosol Powder, Breath Activated Inhale 2 puffs into the lungs 2 (two) times daily.      aspirin 81 MG Oral Tab Take 1 tablet (81 mg total) by mouth daily.      Azelastine-Fluticasone 137-50 MCG/ACT Nasal  Suspension by Nasal route.      VITAMIN D OR Take 10,000 Int'l Units by mouth daily.      MSM OR Take 1,000 mg by mouth.        VITAMIN C 500 MG OR TABS 1 TABLET DAILY      SELENIUM 200 MCG OR TABS 2 qd        Past Medical History:    ADHD (attention deficit hyperactivity disorder)    AR (allergic rhinitis)    Asthma (HCC)    BRCA1 negative    BRCA2 negative    Diverticulitis    10/2020, 2021    Diverticulosis    DJD (degenerative joint disease)    Hashimoto's thyroiditis    Herniated disc, cervical    Lactose intolerance    Migraine headache    Primary osteoarthritis of right knee    PVC (premature ventricular contraction)    Spinal stenosis    CERVICAL MRI    Transfusion    tubal pregnancy      Past Surgical History:   Procedure Laterality Date    Appendectomy        ,    TWO, EACH TIME TWINS.    Cholecystectomy      Colonoscopy      diverticulosis, and internal hemorrhoids    Colonoscopy      Colonoscopy N/A 3/22/2019    Procedure: COLONOSCOPY, POSSIBLE BIOPSY, POSSIBLE POLYPECTOMY 55776;  Surgeon: Clayton Ramirez MD;  Location: [facility], [facility]    Hysteroscopy  2018    S&N, R/S of Endometrial Polyp    Other surgical history      BMT    Other surgical history      ECTOPIC  PREGNANCY REMOVAL    Other surgical history      ENDOSCOPIC  SINUS    Other surgical history      L  WRIST  PISIFORM-X  D/T  FX    Tonsillectomy        Family History   Problem Relation Age of Onset    Cancer Mother         NHL and pancreatic cancer    Hypertension Mother     Cancer Other         colon cancer in maternal aunt and maternal 1st cousin    Heart Disorder Father         1)PACER   2)AF  3)CHF    Hypertension Father     Other (Other) Daughter         1)CHURG-STRAUSSE  2)ASTHMA    Other (Other) Son         ASTHMA    Heart Disorder Maternal Grandfather         MI AT 66    Heart Disorder Paternal Grandfather         CHF    Hypertension Sister     Other (SLE) Sister      Other (HYPOTHYROID) Sister     Psychiatric Son         ASPERGER'S    Psychiatric Son         ADD    Hypertension Sister     Other (SJOGERN'S) Sister     Psychiatric Brother         ADHD     Breast Cancer Cousin 35        P 1st cousin- dx age 35    Breast Cancer Paternal Aunt 64        dx age- 64    Ovarian Cancer Paternal Cousin Female     Other (Pancreatic Cancer) Maternal Aunt         stage 4 metastatic      Social History:   Social History     Socioeconomic History    Marital status:      Spouse name: JUAN    Number of children: 4    Years of education: PSY. D.   Occupational History    Occupation: NEUROPSYCHOLOGIST     Comment: SELF   Tobacco Use    Smoking status: Never    Smokeless tobacco: Never   Vaping Use    Vaping status: Never Used   Substance and Sexual Activity    Alcohol use: Yes     Alcohol/week: 0.0 standard drinks of alcohol     Comment: Occ glass of wine 2x/mo    Drug use: No     Comment: MARIJUANA 30 YRS. AGO W/O ABUSE/XS.    Sexual activity: Yes     Partners: Male   Other Topics Concern     Service No    Blood Transfusions Yes     Comment: 1993    Caffeine Concern No     Comment: 0-1 SERVING/D    Weight Concern No    Special Diet No    Exercise Yes     Comment: QD, AEROBIXC    Bike Helmet Yes    Seat Belt Yes   Social History Narrative    LIVES WITH SPOUSE AND TWO CHILDREN IN SINGLE FAMILY HOME.     Occ: . : . Children: 4 - 2 sets of twins    Pt works in mental health  Exercise: three times per week.  Diet: watches calories closely     REVIEW OF SYSTEMS:   GENERAL: feels well otherwise  SKIN: denies any unusual skin lesions  EYES:denies blurred vision or double vision  HEENT: denies nasal congestion, sinus pain or ST  LUNGS: denies shortness of breath with exertion  CARDIOVASCULAR: denies chest pain on exertion  GI: denies abdominal pain,denies heartburn  : denies dysuria, vaginal discharge or itching   MUSCULOSKELETAL: denies back pain  NEURO: denies  headaches  PSYCHE: denies depression or anxiety  HEMATOLOGIC: denies hx of anemia  ENDOCRINE: thyroid history  ALL/ASTHMA: denies asthma ; severe allergies     EXAM:   /70   Pulse 78   Resp 20   Ht 5' 6\" (1.676 m)   Wt 168 lb (76.2 kg)   LMP 10/13/2007   SpO2 99%   BMI 27.12 kg/m²   Body mass index is 27.12 kg/m².   GENERAL: alert and oriented X 3, well developed, well nourished,in no apparent distress  CARDIO: RRR without murmur  LUNGS: clear to auscultation  NECK: supple,no adenopathy, + thyromegaly  HEENT: atraumatic, normocephalic,ears and throat are clear  EYES:PERRLA, EOMI, normal,conjunctiva are clear  SKIN: norashes,no suspicious lesions  GI: good BS's,, HSM or tenderness  EXTREMITIES: no cyanosis, clubbing or edema, right ankle + pain of deltoid ligament   NEURO: cranial nerves are intact,motor and sensory are grossly intact    ASSESSMENT AND PLAN:   Beulah Menard is a 63 year old female who presents with     1. Vision changes    - Ophthalmology Referral - In Network  - z [facility] MRI IAC/PITUITARY/ORBIT (CPT=70551); Future  - z [facility] MRI IAC/PITUITARY/ORBIT (CPT=70551)    2. Near syncope  F/u with cardiology   - Ophthalmology Referral - In Network  - CBC With Differential With Platelet; Future  - Comp Metabolic Panel (14); Future  - Free T4, (Free Thyroxine); Future  - Assay, Thyroid Stim Hormone; Future  - Free T3 (Triiodothryronine); Future  - Ferritin [E]; Future  - z [facility] MRI IAC/PITUITARY/ORBIT (CPT=70551); Future  - z [facility] MRI IAC/PITUITARY/ORBIT (CPT=70551)    3. Chronic pansinusitis    - z [facility] MRI IAC/PITUITARY/ORBIT (CPT=70551); Future  - z [facility] MRI IAC/PITUITARY/ORBIT (CPT=70551)      Questions answered and patient indicates understanding of these issues and agrees to the plan.  Follow up in 1-2 mo or sooner if needed.

## 2024-05-10 ENCOUNTER — PATIENT MESSAGE (OUTPATIENT)
Dept: FAMILY MEDICINE CLINIC | Facility: CLINIC | Age: 64
End: 2024-05-10

## 2024-05-10 DIAGNOSIS — G93.89 BRAIN MASS: Primary | ICD-10-CM

## 2024-05-10 NOTE — TELEPHONE ENCOUNTER
From: Beulah Menard  To: Maria Luisa Sunshine  Sent: 5/10/2024 2:43 PM CDT  Subject: Mass found—next steps?    A bit concerned, but not panicking. What’s next, and should we be moving quickly?

## 2024-05-15 ENCOUNTER — HOSPITAL ENCOUNTER (OUTPATIENT)
Dept: MRI IMAGING | Facility: HOSPITAL | Age: 64
Discharge: HOME | End: 2024-05-15
Attending: FAMILY MEDICINE

## 2024-05-15 DIAGNOSIS — G93.89 BRAIN MASS: ICD-10-CM

## 2024-05-15 PROCEDURE — 70553 MRI BRAIN STEM W/O & W/DYE: CPT | Performed by: FAMILY MEDICINE

## 2024-05-15 PROCEDURE — A9575 INJ GADOTERATE MEGLUMI 0.1ML: HCPCS | Performed by: FAMILY MEDICINE

## 2024-05-15 RX ORDER — GADOTERATE MEGLUMINE 376.9 MG/ML
20 INJECTION INTRAVENOUS
Status: COMPLETED | OUTPATIENT
Start: 2024-05-15 | End: 2024-05-15

## 2024-05-15 RX ADMIN — GADOTERATE MEGLUMINE 20 ML: 376.9 INJECTION INTRAVENOUS at 09:34:00

## 2024-05-17 ENCOUNTER — TELEPHONE (OUTPATIENT)
Dept: SURGERY | Facility: CLINIC | Age: 64
End: 2024-05-17

## 2024-05-17 ENCOUNTER — OFFICE VISIT (OUTPATIENT)
Dept: SURGERY | Facility: CLINIC | Age: 64
End: 2024-05-17

## 2024-05-17 VITALS
SYSTOLIC BLOOD PRESSURE: 126 MMHG | BODY MASS INDEX: 27 KG/M2 | WEIGHT: 168 LBS | DIASTOLIC BLOOD PRESSURE: 78 MMHG | HEART RATE: 106 BPM | HEIGHT: 66 IN

## 2024-05-17 DIAGNOSIS — D32.9 MENINGIOMA (HCC): Primary | ICD-10-CM

## 2024-05-17 PROBLEM — R00.2 PALPITATIONS: Status: ACTIVE | Noted: 2022-03-09

## 2024-05-17 PROBLEM — E06.3 HASHIMOTO'S THYROIDITIS: Status: ACTIVE | Noted: 2022-03-09

## 2024-05-17 PROBLEM — R00.0 TACHYCARDIA: Status: ACTIVE | Noted: 2022-03-09

## 2024-05-17 PROCEDURE — 99204 OFFICE O/P NEW MOD 45 MIN: CPT | Performed by: NEUROLOGICAL SURGERY

## 2024-05-17 NOTE — TELEPHONE ENCOUNTER
Patient brought in imaging disk from [facility], MRI orb/face/neck DOS 05/09/2024 is uploaded.     Uploaded successfully into PACs.

## 2024-05-18 NOTE — H&P
[facility]  Neurological Surgery Clinic Note    Beulah Menard  10/13/1960  [id number]  PCP: Maria Luisa Sunshine DO    REASON FOR VISIT:  Right frontal meningioma     HISTORY OF PRESENT ILLNESS:  Beulah Menard is a 63 year old female who presents to clinic for evaluation of a right frontal lesion most consistent with a meningioma. This lesion was found during workup of episodes of tunnel vision, balance difficulty, and feeling faint which she had 5 times while walking between December and March.  Then in April, she had an episode of severe epistaxis, which was controlled in the ED.  She also reports six months of headaches. She has a history of sinusitis.  Denies LOC, rhythmic movements, or stroke like symptoms.  MRI brain 5/15/24 demonstrates a 2.5 x 2.1 x 1.4 cm right posterior frontal convexity enhancing lesions most consistent with a meningioma without underlying brain edema and mild brain compression.    PAST MEDICAL HISTORY:  Past Medical History:    ADHD (attention deficit hyperactivity disorder)    AR (allergic rhinitis)    Asthma (HCC)    BRCA1 negative    BRCA2 negative    Diverticulitis    10/2020, 2021    Diverticulosis    DJD (degenerative joint disease)    Hashimoto's thyroiditis    Herniated disc, cervical    Lactose intolerance    Migraine headache    Primary osteoarthritis of right knee    PVC (premature ventricular contraction)    Spinal stenosis    CERVICAL MRI    Transfusion    tubal pregnancy       PAST SURGICAL HISTORY:  Past Surgical History:   Procedure Laterality Date    Appendectomy        ,    TWO, EACH TIME TWINS.    Cholecystectomy      Colonoscopy      diverticulosis, and internal hemorrhoids    Colonoscopy      Colonoscopy N/A 3/22/2019    Procedure: COLONOSCOPY, POSSIBLE BIOPSY, POSSIBLE POLYPECTOMY 57201;  Surgeon: Clayton Ramirez MD;  Location: [facility] [facility] [place], [facility]    Hysteroscopy  2018     S&N, R/S of Endometrial Polyp    Other surgical history  2010    BMT    Other surgical history  1993    ECTOPIC  PREGNANCY REMOVAL    Other surgical history  2010    ENDOSCOPIC  SINUS    Other surgical history  2013    L  WRIST  PISIFORM-X  D/T  FX    Tonsillectomy         FAMILY HISTORY:  family history includes Breast Cancer (age of onset: 35) in her cousin; Breast Cancer (age of onset: 64) in her paternal aunt; Cancer in her mother and another family member; HYPOTHYROID in her sister; Heart Disorder in her father, maternal grandfather, and paternal grandfather; Hypertension in her father, mother, sister, and sister; Other in her daughter and son; Ovarian Cancer in her paternal cousin female; Pancreatic Cancer in her maternal aunt; Psychiatric in her brother, son, and son; SJOGERN'S in her sister; SLE in her sister.    SOCIAL HISTORY:   reports that she has never smoked. She has never used smokeless tobacco. She reports current alcohol use. She reports that she does not use drugs.  She is a neuropsychologist    ALLERGIES:  Allergies   Allergen Reactions    Ciprofloxacin OTHER (SEE COMMENTS)     Cognitive changes with oral use    Mold     Morphine NAUSEA AND VOMITING    Tree, Elm UNKNOWN     All trees       MEDICATIONS:  Current Outpatient Medications on File Prior to Visit   Medication Sig Dispense Refill    AZSTARYS 52.3-10.4 MG Oral Cap       AZSTARYS 26.1-5.2 MG Oral Cap       desloratadine 5 MG Oral Tab Take 1 tablet (5 mg total) by mouth 2 (two) times daily.      liothyronine 5 MCG Oral Tab       liothyronine (CYTOMEL) 5 MCG Oral Tab Take 1 tablet (5 mcg total) by mouth daily. 90 tablet 1    SYNTHROID 75 MCG Oral Tab Take 1 tablet (75 mcg total) by mouth before breakfast. 90 tablet 1    Lactobacillus (PROBIOTIC ACIDOPHILUS OR) Take by mouth.      estradiol 0.05 MG/24HR Transdermal Patch Biweekly Place 1 patch onto the skin twice a week. 24 patch 4    progesterone 100 MG Oral Cap Take 1 capsule (100 mg  total) by mouth nightly. 90 capsule 4    Levalbuterol HCl 0.63 MG/3ML Inhalation Nebu Soln as needed.        Magnesium Gluconate 250 MG Oral Tab Take 750 mg by mouth.      omega-3 fatty acids (FISH OIL) 1000 MG Oral Cap Take 1,000 mg by mouth 2 (two) times daily.      Desloratadine (CLARINEX OR) Take 5 mg by mouth 2 (two) times daily.        PULMICORT FLEXHALER 180 MCG/ACT Inhalation Aerosol Powder, Breath Activated Inhale 2 puffs into the lungs 2 (two) times daily.      aspirin 81 MG Oral Tab Take 1 tablet (81 mg total) by mouth daily.      Azelastine-Fluticasone 137-50 MCG/ACT Nasal Suspension by Nasal route.      VITAMIN D OR Take 10,000 Int'l Units by mouth daily.      MSM OR Take 1,000 mg by mouth.        VITAMIN C 500 MG OR TABS 1 TABLET DAILY      SELENIUM 200 MCG OR TABS 2 qd       No current facility-administered medications on file prior to visit.       REVIEW OF SYSTEMS:  A 10-point system was reviewed.  Pertinent positives and negatives are noted in HPI.      PHYSICAL EXAMINATION:  VITAL SIGNS: /78   Pulse 106   Ht 66\"   Wt 168 lb (76.2 kg)   LMP 10/13/2007   BMI 27.12 kg/m²     A&Ox3, no acute distress  PERRL, EOMi, FS, TM  Full strength x 4, no drift  Sensation intact     ASSESSMENT:  62yo female with an incidental right frontal convexity meningioma without brain edemea    We discussed the differential diagnosis and the natural history of meningiomas. I explained that I do not think the symptoms she is experiencing are related to this lesion, but we discussed concerning signs and symptoms for which to seek emergent medical attention. I recommended serial observation with imaging. She expressed understanding and agreement with the plan.     Plan:  - She will obtain outside imaging of prior CT sinus images to see if the lesions was previously visible (reports in [facility]where do not note the lesion)   - Follow up in 6 months with a repeat MRI brain with and without contrast    Salazar KATZ  MD Michelle  Neurological Surgery  [facility] Time: 45 min including face to face time, chart review, imaging interpretation, and coordination of care

## 2024-05-31 ENCOUNTER — TELEPHONE (OUTPATIENT)
Dept: SURGERY | Facility: CLINIC | Age: 64
End: 2024-05-31

## 2024-05-31 NOTE — TELEPHONE ENCOUNTER
Reviewed MRI brain without contrast from 2010, on which the meningioma is not apparent.  I spoke to the patient about the findings and we will continue with the current plan.

## 2024-08-03 NOTE — H&P
Beulah Menard presents for follow up for evaluation and management of abdominal pain.  Over the last 4 months, has had more frequent episodes of bilateral lower abdominal cramps. Uses dicyclomine and advil when having pain.   She's also noted some narrowing for her stool.  Last colonoscopy 2019.   Continues to have constipation - probiotic, lots of fiber, no other stool softeners.    Had hemorrhoidectomy last year.   Pain is daily.  She's had two sets of twins, both c section.   She is concerned about whether she is getting recurrent diverticulitis.  Symptoms have been present for - chronically. The patient was last seen 3/2023.  Recent labs, xrays or endoscopies since include none.  New complaints or medical issues since last visit include as above.                  Struggling with hypothyroidism, on varying doses of thyroid medication.     Beulah Menard presents for new problem evaluation and management of intermittent abdominal pain and constipation.   Symptoms intermittent LLQ, LUQ and RUQ pain, better with bms.   Pain different from prior bouts of diverticulitis.  Advil helps the pain.  Recent change in gastointestinal complaints include as above.   Pt last seen 5/2022.  Recent labs, xrays or endoscopies include none by me.                 Hx of rectal bleeding, intermittent, over the last 10 years.  Last hemorrhoidal banding > 10 years ago.               Continues on probiotics, high fiber diet.  Has used dicyclomine - has helped.               Has been on and off antibiotics since last fall for sinusitis.    Changes in patient's  medical status since last visit include recent bout of covid, required paxlovid and antibiotics - 3/2023.     Last colonoscopy 2019.  Maternal aunt colon cancer at 63.  Mother had pancreatic cancer at 70.  Sister has hx of diverticulitis   Assessment:    LLQ abdominal pain  Hx of diverticulitis  Rectal bleeding / enlarged internal hemorrhoids               The patient's  abdominal pain is related to functional source from constipation, likely related to multiple recent antibiotics and covid infection.  Last bout of diverticulitis in 5/2022.               Pt requesting surgical recommendation for hemorrhoidal management  Plan:  1. Dicyclomine 10 mg every 6 hours as needed.  2. Continue probiotics and fiber.  3. Follow up with colorectal surgery - Javier Power at [facility].  4. Follow up yearly.     Time spent with patient 25'.    cc: Dr. Sunshine            Electronically signed by Clayton Ramirez MD at 3/31/2023  3:00 PM     Allergy ;   Allergies   Mold                      Morphine                NAUSEA AND VOMITING  Bret Obregon               UNKNOWN    Comment:All trees            Current Outpatient Medications   Medication Sig Dispense Refill    estradiol 0.05 MG/24HR Transdermal Patch Biweekly Place 1 patch onto the skin twice a week. 24 patch 4    progesterone 100 MG Oral Cap Take 1 capsule (100 mg total) by mouth nightly. 90 capsule 4    liothyronine 5 MCG Oral Tab          dicyclomine 10 MG Oral Cap 10 mg po every 6 hours as needed. 40 capsule 3    Progesterone Micronized 100 MG Oral Cap Take 1 capsule (100 mg total) by mouth nightly. 90 capsule 4    Levalbuterol HCl 0.63 MG/3ML Inhalation Nebu Soln as needed.          Beta Glucan Does not apply Powder Jason's Beta 1,3 Glucan 500 mg capsules Sig: Pt to take 1 capsule QD Dispense #90 with 3 refills 90 Bottle 1    Magnesium Gluconate 250 MG Oral Tab Take 750 mg by mouth.        omega-3 fatty acids (FISH OIL) 1000 MG Oral Cap Take 1,000 mg by mouth 2 (two) times daily.        Desloratadine (CLARINEX OR) Take 5 mg by mouth 2 (two) times daily.          SYNTHROID 75 MCG Oral Tab     3    PULMICORT FLEXHALER 180 MCG/ACT Inhalation Aerosol Powder, Breath Activated Inhale 2 puffs into the lungs 2 (two) times daily.        Methylphenidate HCl ER 18 MG Oral Tab CR Take by mouth 4 (four) times daily.          aspirin 81 MG Oral  Tab Take 81 mg by mouth daily.        Azelastine-Fluticasone 137-50 MCG/ACT Nasal Suspension by Nasal route.        VITAMIN D OR 5000 IU Daily        MSM OR Take 1,000 mg by mouth.          MULTI-VITAMIN OR 1 tab daily        VITAMIN C 500 MG OR TABS 1 TABLET DAILY        SELENIUM 200 MCG OR TABS 2 qd               Past Medical History:   Diagnosis Date    ADHD (attention deficit hyperactivity disorder) 08/15/2005    AR (allergic rhinitis) 08/15/2005    Asthma      BRCA1 negative      BRCA2 negative      Diverticulitis       10/2020, 2021    Diverticulosis 08/15/2008    DJD (degenerative joint disease)      Hashimoto's thyroiditis      Herniated disc, cervical      Lactose intolerance 08/15/2008    Migraine headache 08/15/1973    Primary osteoarthritis of right knee 2018    PVC (premature ventricular contraction) 2021    Spinal stenosis       CERVICAL MRI    Transfusion      tubal pregnancy            Past Surgical History:   Procedure Laterality Date    APPENDECTOMY          ,     TWO, EACH TIME TWINS.    CHOLECYSTECTOMY       COLONOSCOPY        diverticulosis, and internal hemorrhoids    COLONOSCOPY       COLONOSCOPY N/A 3/22/2019     Procedure: COLONOSCOPY, POSSIBLE BIOPSY, POSSIBLE POLYPECTOMY 07054;  Surgeon: Clayton Ramirez MD;  Location: [facility]    HYSTEROSCOPY   2018     S&N, R/S of Endometrial Polyp    OTHER SURGICAL HISTORY        BMT    OTHER SURGICAL HISTORY        ECTOPIC  PREGNANCY REMOVAL    OTHER SURGICAL HISTORY        ENDOSCOPIC  SINUS    OTHER SURGICAL HISTORY        L  WRIST  PISIFORM-X  D/T  FX    TONSILLECTOMY             Social History    Tobacco Use      Smoking status: Never      Smokeless tobacco: Never    Vaping Use      Vaping Use: Never used    Alcohol use: Yes      Alcohol/week: 0.0 standard drinks of alcohol      Comment: Occ glass of wine 1x/mo    Drug use: No      Comment: MARIJUANA 30 YRS. AGO  W/O ABUSE/XS.              Family History   Problem Relation Age of Onset    Cancer Mother           NHL and pancreatic cancer    Hypertension Mother      Cancer Other           colon cancer in maternal aunt and maternal 1st cousin    Heart Disorder Father           1)PACER   2)AF  3)CHF    Hypertension Father      Other (Other) Daughter           1)CHURG-STRAUSSE  2)ASTHMA    Other (Other) Son           ASTHMA    Heart Disorder Maternal Grandfather           MI AT 66    Heart Disorder Paternal Grandfather           CHF    Hypertension Sister      Other (SLE) Sister      Other (HYPOTHYROID) Sister      Psychiatric Son           ASPERGER'S    Psychiatric Son           ADD    Hypertension Sister      Other (SJOGERN'S) Sister      Psychiatric Brother           ADHD     Breast Cancer Cousin 35         P 1st cousin- dx age 35    Breast Cancer Paternal Aunt 64         dx age- 64    Ovarian Cancer Paternal Cousin Female      Other (Pancreatic Cancer) Maternal Aunt           stage 4 metastatic         GEN: feels well otherwise, no fevers or chills, no weight loss    SKIN: denies any unusual skin lesions  HEENT: denies jaundice    LUNGS: denies shortness of breath   CV: denies chest pain  GI: denies dysphagia, nausea,vomitting  : denies hematuria    MSK: denies joint pain  NEURO: denies numbness,tingling   PSY: denies depression  ENDOCR: denies diabetes or thyroid history  LMP 10/13/2007  There is no height or weight on file to calculate BMI.  GENERAL: well developed, well nourished, in no apparent distress   SKIN: no rashes, no suspicious lesions  HEENT: non-icteric    NECK: no JVD     LUNGS: clear to auscultation    CARDIO: RRR without murmur  GI: good BS's,no masses, HSM or tenderness  RECTAL: weak anal sphincter tone and squeeze.  Normal baseline PRM tone, unable to relax PRM on bearing down, rectocele  EXTREMITIES: no  edema    NEURO: Alert + Oriented times three     Assessment:    Abdominal pain  Constipation  Pelvic  floor dysfunction.               The etiology of the patient's constipation is related to dyssynergic defecation, inability to empty rectum due to pelvic floor dysfunction, note on rectal exam.  Symptoms not typical of diverticulitis.  Will assess for structural colonic pathology - polyps vs cancer.  Last colonoscopy 2019.       Plan: 1. Squatty potty..           2. Miralax  1 - 2 doses per day..           3. Glycerin suppositories            4. Colonoscopy will be scheduled within the next month or at the patient's earliest convenience.  The risks of perforation, bleeding, and missed polyps or cancer were reviewed with the patient.             5. Pelvic floor physical therapy for pelvic floor dysfunction..

## 2024-08-03 NOTE — OPERATIVE REPORT
PATIENT NAME: Beulah Menard  MRN: [id number]  DATE OF OPERATION: 8/6/2024  REFERRING PHYSICIAN: Dr. Sunshine  Medications:  Versed 8 mg IVP              Fentanyl 100 mcg IVP  DATE OF LAST COLONOSCOPY:  2019  PREOPERATIVE DIAGNOSIS  Abdominal pain  Constipation  Pelvic floor dysfunction.  POSTOPERATIVE DIAGNOSIS:  No polyps noted.  Diverticulosis were scattered throughout the  colon.     PROCEDURE PERFORMED:               Colonoscopy   Endoscopist:                                             Clayton Ramirez MD     PROCEDURE AND FINDINGS: The patient was placed into the left lateral decubitus after informed consent was obtained.  All questions were answered.  An updated history and physical were performed and an ASA score of 2 was assigned.  Informed consent was obtained prior to the procedure, with review of possible complications including bleeding, infection, and missed polyps and or cancer.  IV sedation was administered.    A digital rectal exam was performed and was normal.  The video pediatric colonoscope was passed from anus to cecum.  The ileocecal valve, appendiceal orfice, hepatic and splenic flexures were all well visualized.  The bowel preparation was rated on the Aronchick bowel prep scale as 2. (1 - excellent > 95% mucosa seen; 2 - good - clear liquid up to 25% of the mucosa, 90% mucosa seen;  3 - fair - semisolid stool not suctioned, but 90% of the mucosa seen; 4 - poor - semisolid stool not suctioned, but < 90% mucosa seen; 5 - inadequate - repeat prep needed) .     Findings included no polyps.  Diverticulosis were scattered throughout the colon.  On retroflexion of the scope in the anorectum, normal internal hemorrhoids were noted.     The scope withdrawal from cecum to anus was 9 minutes.   A trained sedation nurse was present to assist in monitoring the patient during the entire length of moderate sedation time.   Total moderate sedation time was 25 minutes.    RECOMMENDATIONS         1. The  patient will be provided with a written summary of today's endoscopic findings.        2. Continue miralax 1 dose twice daily.        3. Proceed with pelvic floor physical therapy.        4. Follow up in the office in 4 - 6 weeks.     Clayton Ramirez MD, Gastroenterologist  Cc: Dr. Sunshine

## 2024-08-06 ENCOUNTER — HOSPITAL ENCOUNTER (OUTPATIENT)
Facility: HOSPITAL | Age: 64
Setting detail: HOSPITAL OUTPATIENT SURGERY
Discharge: HOME | End: 2024-08-06
Attending: INTERNAL MEDICINE | Admitting: INTERNAL MEDICINE
Payer: COMMERCIAL

## 2024-08-06 VITALS
TEMPERATURE: 98 F | DIASTOLIC BLOOD PRESSURE: 79 MMHG | SYSTOLIC BLOOD PRESSURE: 138 MMHG | RESPIRATION RATE: 16 BRPM | HEART RATE: 74 BPM | HEIGHT: 66 IN | OXYGEN SATURATION: 99 % | WEIGHT: 168 LBS | BODY MASS INDEX: 27 KG/M2

## 2024-08-06 PROCEDURE — 99152 MOD SED SAME PHYS/QHP 5/>YRS: CPT | Performed by: INTERNAL MEDICINE

## 2024-08-06 PROCEDURE — 99153 MOD SED SAME PHYS/QHP EA: CPT | Performed by: INTERNAL MEDICINE

## 2024-08-06 PROCEDURE — 0DJD8ZZ INSPECTION OF LOWER INTESTINAL TRACT, VIA NATURAL OR ARTIFICIAL OPENING ENDOSCOPIC: ICD-10-PCS | Performed by: INTERNAL MEDICINE

## 2024-08-06 RX ORDER — SODIUM CHLORIDE, SODIUM LACTATE, POTASSIUM CHLORIDE, CALCIUM CHLORIDE 600; 310; 30; 20 MG/100ML; MG/100ML; MG/100ML; MG/100ML
INJECTION, SOLUTION INTRAVENOUS CONTINUOUS
Status: DISCONTINUED | OUTPATIENT
Start: 2024-08-06 | End: 2024-08-06

## 2024-08-06 RX ORDER — MIDAZOLAM HYDROCHLORIDE 1 MG/ML
INJECTION INTRAMUSCULAR; INTRAVENOUS
Status: DISCONTINUED | OUTPATIENT
Start: 2024-08-06 | End: 2024-08-06

## 2024-08-06 NOTE — BRIEF OP NOTE
Pre-Operative Diagnosis: ABDOMINAL PAIN, LEFT LOWER QUADRANT CHRONIC CONSTIPATION     Post-Operative Diagnosis: diverticulosis      Procedure Performed:   COLONOSCOPY    Surgeons and Role:     * Clayton Ramirez MD - Primary    Assistant(s):        Surgical Findings: see above     Specimen: none     Estimated Blood Loss: No data recorded    Dictation Number:  none    Clayton Ramirez MD  8/6/2024  1:40 PM

## 2024-08-06 NOTE — DISCHARGE INSTRUCTIONS
ENDOSCOPY DISCHARGE INSTRUCTIONS    Procedure Performed:   Colonoscopy  Endoscopist: No name on file  FINDINGS:   Diverticulosis (pockets in colon that develop with age and lack of fiber intake)    MEDICATIONS:  You may resume all other medications today    DIET:  General    BIOPSIES:  No biopsies were taken      ADDITIONAL RECOMMENDATIONS:         1.  Repeat colonoscopy in 10 years for routine colon cancer screening.         2. Continue miralax 1 dose twice daily.        3. Proceed with pelvic floor physical therapy.        4. Follow up in the office in 4 - 6 weeks.     Activity for remainder of today:    REST TODAY  DO NOT drive or operate heavy machinery  DO NOT drink any alcoholic beverages  DO NOT sign any legal documents or make any important decisions    After your procedure(s):  It is not unusual to feel bloated or gassy .  Passing gas and belching is encouraged. Lying on your left side with your knees flexed may relieve the discomfort. A hot pack to the abdomen may also help.    After your gastroscopy (upper endoscopy): You may experience a slight sore throat which will subside. Throat lozenges or salt water gargle can be used.    FOLLOW-UP:  Contact the office at [phone] for follow-up appointment if needed or if you develop any of the following:    Severe abdominal pain/discomfort       Excessive bleeding or black tarry stool  Difficulty breathing or swallowing        Persistent nausea,vomiting, or a fever above 100 degrees or chills

## 2024-08-10 DIAGNOSIS — E06.3 HYPOTHYROIDISM DUE TO HASHIMOTO'S THYROIDITIS: ICD-10-CM

## 2024-08-12 ENCOUNTER — LAB ENCOUNTER (OUTPATIENT)
Dept: LAB | Age: 64
End: 2024-08-12
Attending: STUDENT IN AN ORGANIZED HEALTH CARE EDUCATION/TRAINING PROGRAM
Payer: COMMERCIAL

## 2024-08-12 DIAGNOSIS — E55.9 VITAMIN D DEFICIENCY: ICD-10-CM

## 2024-08-12 DIAGNOSIS — E06.3 HYPOTHYROIDISM DUE TO HASHIMOTO'S THYROIDITIS: ICD-10-CM

## 2024-08-12 LAB
T4 FREE SERPL-MCNC: 1.3 NG/DL (ref 0.8–1.7)
TSI SER-ACNC: 0.93 MIU/ML (ref 0.55–4.78)

## 2024-08-12 PROCEDURE — 86800 THYROGLOBULIN ANTIBODY: CPT

## 2024-08-12 PROCEDURE — 84480 ASSAY TRIIODOTHYRONINE (T3): CPT

## 2024-08-12 PROCEDURE — 86376 MICROSOMAL ANTIBODY EACH: CPT

## 2024-08-12 PROCEDURE — 84439 ASSAY OF FREE THYROXINE: CPT

## 2024-08-12 PROCEDURE — 82306 VITAMIN D 25 HYDROXY: CPT

## 2024-08-12 PROCEDURE — 84443 ASSAY THYROID STIM HORMONE: CPT

## 2024-08-12 NOTE — TELEPHONE ENCOUNTER
Last office note 2/19: Continue Synthroid 75 mcg x 6 days/week + Cytomel 5 mcg daily   Last lab result note 2/19: Thank you for getting your blood work done.  The results are within acceptable range and available for your review. I will go ahead and refill your levothyroxine and cytomel and we will plan on following up as discussed.     Endocrine refill protocol for medications for hypothyroidism and hyperthyroidism    Protocol Criteria:  PASSED  Appointment with Endocrinology completed in the last 12 months or scheduled in the next 6 months     Verify appointment has been completed or scheduled in the appropriate timeline. If so can send a 90 day supply with 1 refill per provider protocol.    Normal TSH result in the past 12 months   Review recent telephone encounters and mychart communications with patient to ensure a dose change has not occurred since last office visit that was not updated in the medication history list   Last completed office visit:2/19/2024 Courtney Oliver DO   Next scheduled Follow up: 8/19 Dr. Oliver     Last TSH result:   TSH   Date Value Ref Range Status   05/02/2024 1.320 0.358 - 3.740 mIU/mL Final     Comment:     This test may exhibit interference when a sample is collected from a person who is consuming high dose of biotin (a.k.a., vitamin B7, vitamin H, coenzyme R) supplements resulting in serum concentrations >100 ng/mL.  Intake of the recommended daily allowance (RDA) for biotin (0.03 mg) has not been shown to typically cause significant interference; however, high dose daily dietary supplements may contain biotin concentrations greater than 150 times (5-10 mg) the RDA.  It is recommended that physicians ask all patients who may be on biotin supplementation to stop biotin consumption at least 72 hours prior to collection of a new sample.     09/22/2014 1.280 0.450 - 4.500 uIU/mL Final   06/05/2010 4.810 (H) 0.450 - 4.500 uIU/mL Final   02/23/2008 3.906 0.350 - 5.500 uIU/mL Final

## 2024-08-13 LAB
T3 SERPL-MCNC: 1.07 NG/ML (ref 0.6–1.81)
THYROGLOB SERPL-MCNC: <15 U/ML (ref ?–60)
THYROPEROXIDASE AB SERPL-ACNC: 29 U/ML (ref ?–60)
VIT D+METAB SERPL-MCNC: 58.8 NG/ML (ref 30–100)

## 2024-08-13 RX ORDER — LIOTHYRONINE SODIUM 5 UG/1
5 TABLET ORAL DAILY
Qty: 90 TABLET | Refills: 0 | Status: SHIPPED | OUTPATIENT
Start: 2024-08-13 | End: 2024-08-19

## 2024-08-19 ENCOUNTER — OFFICE VISIT (OUTPATIENT)
Facility: CLINIC | Age: 64
End: 2024-08-19
Payer: COMMERCIAL

## 2024-08-19 VITALS
DIASTOLIC BLOOD PRESSURE: 76 MMHG | SYSTOLIC BLOOD PRESSURE: 128 MMHG | OXYGEN SATURATION: 98 % | RESPIRATION RATE: 18 BRPM | HEART RATE: 92 BPM

## 2024-08-19 DIAGNOSIS — E06.3 HYPOTHYROIDISM DUE TO HASHIMOTO'S THYROIDITIS: ICD-10-CM

## 2024-08-19 PROCEDURE — 99214 OFFICE O/P EST MOD 30 MIN: CPT | Performed by: STUDENT IN AN ORGANIZED HEALTH CARE EDUCATION/TRAINING PROGRAM

## 2024-08-19 RX ORDER — LIOTHYRONINE SODIUM 5 UG/1
5 TABLET ORAL DAILY
Qty: 90 TABLET | Refills: 1 | Status: SHIPPED | OUTPATIENT
Start: 2024-08-19 | End: 2025-02-15

## 2024-08-19 NOTE — PROGRESS NOTES
ENDOCRINOLOGY, DIABETES, & METABOLISM NOTE     Subjective:   Beulah Menard is a 63 year old female who presents regarding hypothyroidism and osteopenia.     Initial HPI 2/2024  Patient is a former patient of Dr. Tavarez and saw Dr. Patel (Ruth) on 9/2023.   She presents today to discuss her hypothyroidism 2/2 hashimotos and osteopenia.     Hashimotos Hypothyroidism:  Diagnosed with hypothyroidism in 2008 and then started on estrogen/progesterone since 2015 and notes TFTs have remained stable  COVID 1/2023; had post-covid symptoms and asthma/steroids for 2 months (ended around 3/2023)  Was on LT4 75mcg daily and added cytomel 5 mcg 6 days a week around 6/2/2023 with Dr. Tavarez  Around 12/2023 noted hair loss, constipation, mid day fatigue, word finding difficulty, dry skin, palpitations, muscle pain in her legs. Had labs done and FT3 low, recommended to increase Cytomel to 5 mcg 7 days a week  Notes improvement in her symptoms   She is currently on LT4 75 mcg + Cytomel 5 mcg daily without any symptoms     Osteopenia:  -2015 had FOOSH with fracture and surgery   -Takes vitamin d 10,000 and MSM (magnesium, calcium, glucosamine); unsure of dose   -Adequate dietary calcium  -Denies family hx of osteoporosis   -Denies hx of kidney stones, previous hx of calcium problems  -Denies excessive ETOH use  -Drinks tea daily   -Menopause was at age 48; currently on estrogen patch as above    8/2024 8/2024 TSH 0.927, FT4 1.3, total T3 1.07  Currently taking Thryoid Meds: liothyronine Tabs - 5 MCG + Synthroid 75 mcg daily   Has been feeling well from a thyroid standpoint   Underwent MRI 5/2024 and was found to have a 2.5 cm tumor and saw [place] for meningioma, planning on surgery 1/2025  Has a hx of focal motor seizure 8/3, prescribed Vimpat but has not started yet.       History/Other:    Chief Complaint Reviewed and Verified  Nursing Notes Reviewed and   Verified  Tobacco Reviewed  Allergies Reviewed  Medications  Reviewed    Problem List Reviewed  Medical History Reviewed  Surgical History   Reviewed  Family History Reviewed         Tobacco:  She has never smoked tobacco.    Current Outpatient Medications   Medication Sig Dispense Refill    liothyronine 5 MCG Oral Tab Take 1 tablet (5 mcg total) by mouth daily. 90 tablet 1    Coenzyme Q10 (CO Q 10) 100 MG Oral Cap Take 1 Capful by mouth daily.      RESVERATROL OR Take 1 tablet by mouth daily.      AZSTARYS 52.3-10.4 MG Oral Cap       AZSTARYS 26.1-5.2 MG Oral Cap       Lactobacillus (PROBIOTIC ACIDOPHILUS OR) Take by mouth.      estradiol 0.05 MG/24HR Transdermal Patch Biweekly Place 1 patch onto the skin twice a week. 24 patch 4    progesterone 100 MG Oral Cap Take 1 capsule (100 mg total) by mouth nightly. 90 capsule 4    Levalbuterol HCl 0.63 MG/3ML Inhalation Nebu Soln as needed.        Magnesium Gluconate 250 MG Oral Tab Take 750 mg by mouth.      omega-3 fatty acids (FISH OIL) 1000 MG Oral Cap Take 1,000 mg by mouth 2 (two) times daily.      Desloratadine (CLARINEX OR) Take 5 mg by mouth 2 (two) times daily.        PULMICORT FLEXHALER 180 MCG/ACT Inhalation Aerosol Powder, Breath Activated Inhale 2 puffs into the lungs 2 (two) times daily.      aspirin 81 MG Oral Tab Take 1 tablet (81 mg total) by mouth daily.      Azelastine-Fluticasone 137-50 MCG/ACT Nasal Suspension by Nasal route.      VITAMIN D OR Take 5,000 Units by mouth daily.      MSM OR Take 1,000 mg by mouth.        VITAMIN C 500 MG OR TABS 1 TABLET DAILY      SELENIUM 200 MCG OR TABS 2 qd       Allergies   Allergen Reactions    Ciprofloxacin OTHER (SEE COMMENTS)     Cognitive changes with oral use    Mold     Morphine NAUSEA AND VOMITING    Tree, Elm UNKNOWN     All trees     Past Medical History:    ADHD (attention deficit hyperactivity disorder)    Anesthesia complication    prolonged awakening from Morphine    AR (allergic rhinitis)    Arrhythmia    PVC's    Asthma (HCC)    BRCA1 negative    BRCA2  negative    Diverticulitis    10/2020, 2021    Diverticulosis    DJD (degenerative joint disease)    Hashimoto's thyroiditis    Hemangioma    brain    Herniated disc, cervical    History of blood transfusion        Lactose intolerance    Meningioma (HCC)    Migraine headache    Primary osteoarthritis of right knee    PVC (premature ventricular contraction)    Seizure disorder (HCC)    Spinal stenosis    CERVICAL MRI    Transfusion    tubal pregnancy      Past Surgical History:   Procedure Laterality Date    Appendectomy        ,    TWO, EACH TIME TWINS.    Cholecystectomy      Colonoscopy      diverticulosis, and internal hemorrhoids    Colonoscopy      Colonoscopy N/A 3/22/2019    Procedure: COLONOSCOPY, POSSIBLE BIOPSY, POSSIBLE POLYPECTOMY 22149;  Surgeon: Clayton Ramirez MD;  [facility]    Colonoscopy N/A 2024    Procedure: COLONOSCOPY;  Surgeon: Clayton Ramirez MD;  Location:  ENDOSCOPY    Hysteroscopy  2018    S&N, R/S of Endometrial Polyp    Other surgical history      BMT    Other surgical history      ECTOPIC  PREGNANCY REMOVAL    Other surgical history      ENDOSCOPIC  SINUS    Other surgical history      L  WRIST  PISIFORM-X  D/T  FX    Tonsillectomy       Social History     Socioeconomic History    Marital status:      Spouse name: JUAN    Number of children: 4    Years of education: PSY. D.   Occupational History    Occupation: NEUROPSYCHOLOGIST     Comment: SELF   Tobacco Use    Smoking status: Never    Smokeless tobacco: Never   Vaping Use    Vaping status: Never Used   Substance and Sexual Activity    Alcohol use: Yes     Alcohol/week: 0.0 standard drinks of alcohol     Comment: Occ glass of wine 2x/mo    Drug use: No     Comment: MARIJUANA 30 YRS. AGO W/O ABUSE/XS.    Sexual activity: Yes     Partners: Male   Other Topics Concern     Service No    Blood Transfusions Yes     Comment:      Caffeine Concern No     Comment: 0-1 SERVING/D    Weight Concern No    Special Diet No    Exercise Yes     Comment: QD, AEROBIXC    Bike Helmet Yes    Seat Belt Yes   Social History Narrative    LIVES WITH SPOUSE AND TWO CHILDREN IN SINGLE FAMILY HOME.     Social Determinants of Health     Food Insecurity: No Food Insecurity (7/23/2024)    Received from [facility]    Hunger Vital Sign     Worried About Running Out of Food in the Last Year: Never true     Ran Out of Food in the Last Year: Never true   Transportation Needs: No Transportation Needs (7/23/2024)    Received from [facility]    PRAPARE - Transportation     Lack of Transportation (Medical): No     Lack of Transportation (Non-Medical): No   Physical Activity: Sufficiently Active (7/23/2024)    Received from [facility]    Exercise Vital Sign     Days of Exercise per Week: 5 days     Minutes of Exercise per Session: 40 min   Housing Stability: Low Risk  (7/23/2024)    Received from [facility]    Housing Stability     What is your living situation today?: I have a steady place to live     Family History   Problem Relation Age of Onset    Cancer Mother         NHL and pancreatic cancer    Hypertension Mother     Cancer Other         colon cancer in maternal aunt and maternal 1st cousin    Heart Disorder Father         1)PACER   2)AF  3)CHF    Hypertension Father     Other (Other) Daughter         1)CHURG-STRAUSSE  2)ASTHMA    Other (Other) Son         ASTHMA    Heart Disorder Maternal Grandfather         MI AT 66    Heart Disorder Paternal Grandfather         CHF    Hypertension Sister     Other (SLE) Sister     Other (HYPOTHYROID) Sister     Psychiatric Son         ASPERGER'S    Psychiatric Son         ADD    Hypertension Sister     Other (SJOGERN'S) Sister     Psychiatric Brother         ADHD     Breast Cancer Cousin 35        P 1st cousin- dx age 35    Breast Cancer Paternal Aunt 64        dx age- 64    Ovarian Cancer Paternal Cousin Female     Other  (Pancreatic Cancer) Maternal Aunt         stage 4 metastatic         Review of Systems:  10 point ROS completed, refer to HPI for pertinent positives    Objective:   /76   Pulse 92   Resp 18   LMP 10/13/2007   SpO2 98%  Estimated body mass index is 27.12 kg/m² as calculated from the following:    Height as of 8/6/24: 5' 6\" (1.676 m).    Weight as of 8/6/24: 168 lb (76.2 kg).  CONSTITUTIONAL:  awake, alert, cooperative, no apparent distress, and appears stated age    PSYCH: normal affect  LUNGS: breathing comfortably  CARDIOVASCULAR:  regular rate   NECK:  no palpable thyroid nodules        Laboratory Data     Recent Labs: Labs reviewed in EPIC under lab tab on 8/19/2024. Interpretation: TFTs stable 10/2023 with low normal TSH, 12/2023 TSH WNL with low ft3, repeat levels 2/2024-8/2024 WNL    Thyroid:    Lab Results   Component Value Date    TSH 0.927 08/12/2024    TSH 1.320 05/02/2024    TSH 1.490 02/19/2024    T4F 1.3 08/12/2024    T4F 1.0 05/02/2024    T4F 0.8 02/19/2024     Bones:  Lab Results   Component Value Date    VITD 58.8 08/12/2024    VITD 41.9 12/18/2023    VITD 48.6 09/25/2023        Imaging     Radiology: Personally reviewed pertinent imaging   I reviewed the patient's records from outside facility: osteopenia of femoral neck 7/2023      5/15/2024 MRI Brain  -2.5 cm meningioma along high right frontal convexity     7/5/2023 DXA    Bone Density:   -----------------------------------------------------------------   Region                  BMD    T-score  Z-score   Classification   -----------------------------------------------------------------   AP Spine (L1, L2, L3)         1.010   -0.1      1.6     Normal   Femoral Neck (Left)      0.706   -1.3      0.2     Osteopenia   Total Hip (Left)         0.902   -0.3      0.9     Normal   -----------------------------------------------------------------     4/2021 THYROID US  FINDINGS:  Right thyroid lobe:  Measures 4.6 x 1.2 x 1.4 cm (length x  transverse x transverse).  Previously described a subcentimeter hyperechoic nodule in the posterior  interpolar region is today only vaguely suggested, poorly delineated from the  surrounding markedly heterogeneous parenchyma, but without significant  alteration in size or appearance when accounting for different equipment.  The thyroid isthmus measures 0.3 cm in thickness.     Left thyroid lobe:  Measures 4.4 x 1.0 x 1.3 cm (length x transverse x transverse).  Interpolar hypoechoic 0.7 cm nodule is also less conspicuous on today's exam.  Diffusely heterogeneous appearance of the parenchyma is unchanged. There are no  new dominant lesions.  There is no regional cervical lymphadenopathy by size criteria, including  incidentally noted level 2B left lymph node and small lymph node inferior to the  left lower thyroid pole both measuring less than a centimeter in short axis.    IMPRESSION:  Overall, there is no significant alteration when compared to prior studies  dating back to 10/14/2015.    ASSESSMENT/PLAN   Assessment & Plan:     Beulah Menard is a 63 year old female who presented to clinic for:    Hypothyroidism due to Hashimoto's thyroiditis  Pathophysiology of condition, goals of therapy,  d/w pt in detail.   Clinical significance of thyroid testing discussed, including goal of biochemical euthyroidism as determined by TSH level  Patient is currently clinically euthyroid   Repeat TFTs now and prior to follow up in 6 months  Patient to inform me if stopping estrogen or starting Vimpat   Continue Synthroid 75 mcg daily + Cytomel 5 mcg daily     Thyroid nodules  Discussed that patient has had stable nodules on US with thyroid US follow up from [id number]  Discussed repeating US since it has been 2 years vs. Obtaining imaging if symptoms arise   3/2024 Thyroid US: 9x7x8 mm RMP TR3 nodule and 7x4x5 mm LLP TR3 nodule, stable in size   Will follow up in 1 year (around 2/2025) to discuss if symptomatic      Osteopenia  FRAX below treatment threshold on DXA 7/2023  Patient to continue current vitamin D supplementation with goal vitamin D above 30  Patient to continue 1200 mg of calcium intake (supplement + diet) daily  Discussed fall precautions and weight-bearing exercise   Due for repeat DXA 7/2025      Return in about 6 months (around 2/19/2025).       Courtney Oliver DO, 8/19/2024

## 2024-08-19 NOTE — PATIENT INSTRUCTIONS
Return Visit   [ x ] Physician in 6 months   [  ] In person or video visit  [  ] In person only    [ x ] After visit summary   [ x ] Placed labs/imaging.       -Continue current levothyroxine + liothyronine  -If you start Vimpat, please repeat labs 6 weeks post  -If you stop estrogen, please let me know and I can guide you regarding any thyroid dose changes   -I will follow up with you around 6 weeks after your surgery (around 2/2025) with labs prior to our follow up    Take care!  -Dr. Oliver

## 2024-08-23 ENCOUNTER — PATIENT MESSAGE (OUTPATIENT)
Facility: CLINIC | Age: 64
End: 2024-08-23

## 2024-08-26 ENCOUNTER — PATIENT MESSAGE (OUTPATIENT)
Dept: SURGERY | Facility: CLINIC | Age: 64
End: 2024-08-26

## 2024-08-26 NOTE — TELEPHONE ENCOUNTER
Noted that patient has messaged with a condition update and an update on imaging.  Patient included written reports of CT sinuses from 2/8/23, CTA head without and with contrast from 8/21/24, and Normal EEG findings.  Imaging has been uploaded by [facility].    Patient reports suffering from a focal motor seizure in 4/24 or 5/24 and some seizure activity on 8/3/24 while sleeping.      Per Dr. Martinez at  on 5/17/24:    \"ASSESSMENT:  62yo female with an incidental right frontal convexity meningioma without brain edemea    We discussed the differential diagnosis and the natural history of meningiomas. I explained that I do not think the symptoms she is experiencing are related to this lesion, but we discussed concerning signs and symptoms for which to seek emergent medical attention. I recommended serial observation with imaging. She expressed understanding and agreement with the plan.      Plan:  - She will obtain outside imaging of prior CT sinus images to see if the lesions was previously visible (reports in [facility] do not note the lesion)   - Follow up in 6 months with a repeat MRI brain with and without contrast\"    Routed to FELY Francois.

## 2024-08-26 NOTE — TELEPHONE ENCOUNTER
From: Beulah Menard  To: Salazar Martinez  Sent: 8/26/2024 9:33 AM CDT  Subject: Recent CT-A of Brain, found recent sinus CT, EEG    Good morning, Dr. Martinez. Because I had what I now know is a focal motor seizure of my left foot in April or May, and then a single generalized (I believe it was bilateral) lunging movement that followed an electrical discharge sensation in my thoracic spine on 8/3/24 while sleeping, Dr. Walker ordered a brain CT-A and EEG, both normal. I also found the most recent CT of my sinuses from Feb 2023, since you were looking for them, am attaching all 3 reports. I had [facility] upload the sinus CT and brain CT-A so you could compare them with the known parameters of my meningioma that was found in my sinus and brain MRI's of May 2024. No need to call or message me back if there is nothing of significance. Thank you, Radha

## 2024-08-26 NOTE — TELEPHONE ENCOUNTER
Phoned patient and reviewed below. She verbalized understanding. Reviewed two sets of thyroid orders already in system. She states she will f/u with our office when she starts that Vimpat. No further question.     Closing this encounter.

## 2024-08-26 NOTE — TELEPHONE ENCOUNTER
Please let her know that I did not see any association with vimpat and thyroid dysfunction. She should remember to space out her Vimpat by at least 30-60 minutes from her LT4. Please let me know if she has any questions. We can repeat labs 6-8 weeks after she starts vimpat. Thanks

## 2024-08-27 NOTE — TELEPHONE ENCOUNTER
Received feedback from Dr. Martinez that he would like to see patient in the office either this week or next week.  Messaged patient asking her to call [facility] to schedule. Routed to PSR to arrange appointment.

## 2024-09-05 ENCOUNTER — OFFICE VISIT (OUTPATIENT)
Dept: SURGERY | Facility: CLINIC | Age: 64
End: 2024-09-05
Payer: COMMERCIAL

## 2024-09-05 VITALS — SYSTOLIC BLOOD PRESSURE: 130 MMHG | OXYGEN SATURATION: 96 % | HEART RATE: 66 BPM | DIASTOLIC BLOOD PRESSURE: 72 MMHG

## 2024-09-05 DIAGNOSIS — D32.9 MENINGIOMA (HCC): Primary | ICD-10-CM

## 2024-09-05 PROCEDURE — 99215 OFFICE O/P EST HI 40 MIN: CPT | Performed by: NEUROLOGICAL SURGERY

## 2024-09-05 RX ORDER — LEVOTHYROXINE SODIUM 75 MCG
75 TABLET ORAL
COMMUNITY
Start: 2024-08-12

## 2024-09-05 RX ORDER — DICYCLOMINE HYDROCHLORIDE 10 MG/1
10 CAPSULE ORAL EVERY 6 HOURS PRN
COMMUNITY
Start: 2024-09-03

## 2024-09-05 NOTE — PROGRESS NOTES
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 This lesion was found during workup of episodes of tunnel vision, balance difficulty, and feeling faint which she had 5 times while walking between December and March.  Then in April, she had an episode of severe epistaxis, which was controlled in the ED.  She also reports six months of headaches. She has a history of sinusitis.  Denies LOC, rhythmic movements, or stroke like symptoms.  MRI brain 5/15/24 demonstrates a 2.5 x 2.1 x 1.4 cm right posterior frontal convexity enhancing lesions most consistent with a meningioma without underlying brain edema and mild brain compression.     Interval History 9/5/24  She reports that since her last visit she has had seizure-like activity.  She reports multiple episodes over the past few months of left lower leg rhythmic movements and/or sensation changes that were self-limited.  She started Vimpat recently and has not had any more of these episodes.  She also reported that she was awoken from sleep on 8/25/2024 with both her arms and legs uncontrollably moving rhythmically without loss of consciousness.  She also reports mild cognitive difficulty and naming difficulty.  She had an EEG through [facility] on 8/19/2024 that was normal and negative for seizures.  CTA head and neck 8/20/2024 demonstrates no vascular malformations and demonstrates a relatively stable right convexity meningioma.  As previously noted, I reviewed her MRI brain without contrast from 2010 on which the meningioma was not visible.  She was seen at [facility] in [place], [place] recently for  a second opinion.    PAST MEDICAL HISTORY:  Past Medical History:    ADHD (attention deficit hyperactivity disorder)    Anesthesia complication    prolonged awakening from Morphine    AR (allergic rhinitis)    Arrhythmia    PVC's    Asthma (HCC)    BRCA1 negative    BRCA2 negative    Diverticulitis    10/2020, 2021    Diverticulosis    DJD (degenerative joint disease)    Hashimoto's thyroiditis    Hemangioma    brain    Herniated disc, cervical    History of blood transfusion        Lactose intolerance    Meningioma (HCC)    Migraine headache    Primary osteoarthritis of right knee    PVC (premature ventricular contraction)    Seizure disorder (HCC)    Spinal stenosis    CERVICAL MRI    Transfusion    tubal pregnancy       PAST SURGICAL HISTORY:  Past Surgical History:   Procedure Laterality Date    Appendectomy        ,    TWO, EACH TIME TWINS.    Cholecystectomy      Colonoscopy      diverticulosis, and internal hemorrhoids    Colonoscopy      Colonoscopy N/A 3/22/2019    Procedure: COLONOSCOPY, POSSIBLE BIOPSY, POSSIBLE POLYPECTOMY 85762;  Surgeon: Clayton Ramirez MD;  [facility] [facility]    Colonoscopy N/A 2024    Procedure: COLONOSCOPY;  Surgeon: Clayton Ramirez MD;  Location:  ENDOSCOPY    Hysteroscopy  2018    S&N, R/S of Endometrial Polyp    Other surgical history      BMT    Other surgical history      ECTOPIC  PREGNANCY REMOVAL    Other surgical history      ENDOSCOPIC  SINUS    Other surgical history      L  WRIST  PISIFORM-X  D/T  FX    Tonsillectomy         FAMILY HISTORY:  family history includes Breast Cancer (age of onset: 35) in her cousin; Breast Cancer (age of onset: 64) in her paternal aunt; Cancer in her mother and another family member; HYPOTHYROID in her sister; Heart Disorder in her father, maternal grandfather, and paternal grandfather; Hypertension in her father, mother, sister, and sister; Other in her  daughter and son; Ovarian Cancer in her paternal cousin female; Pancreatic Cancer in her maternal aunt; Psychiatric in her brother, son, and son; SJOGERN'S in her sister; SLE in her sister.    SOCIAL HISTORY:   reports that she has never smoked. She has never used smokeless tobacco. She reports current alcohol use. She reports that she does not use drugs.    ALLERGIES:  Allergies   Allergen Reactions    American Elm OTHER (SEE COMMENTS)     All trees    Ciprofloxacin OTHER (SEE COMMENTS)     Cognitive changes with oral use    Mold     Morphine NAUSEA AND VOMITING    Tree, Elm UNKNOWN     All trees       MEDICATIONS:  Current Outpatient Medications on File Prior to Visit   Medication Sig Dispense Refill    dicyclomine 10 MG Oral Cap Take 1 capsule (10 mg total) by mouth every 6 (six) hours as needed.      SYNTHROID 75 MCG Oral Tab Take 1 tablet (75 mcg total) by mouth before breakfast.      liothyronine 5 MCG Oral Tab Take 1 tablet (5 mcg total) by mouth daily. 90 tablet 1    Coenzyme Q10 (CO Q 10) 100 MG Oral Cap Take 1 Capful by mouth daily.      RESVERATROL OR Take 1 tablet by mouth daily.      AZSTARYS 52.3-10.4 MG Oral Cap       AZSTARYS 26.1-5.2 MG Oral Cap       Lactobacillus (PROBIOTIC ACIDOPHILUS OR) Take by mouth.      estradiol 0.05 MG/24HR Transdermal Patch Biweekly Place 1 patch onto the skin twice a week. 24 patch 4    progesterone 100 MG Oral Cap Take 1 capsule (100 mg total) by mouth nightly. 90 capsule 4    Levalbuterol HCl 0.63 MG/3ML Inhalation Nebu Soln as needed.        Magnesium Gluconate 250 MG Oral Tab Take 750 mg by mouth.      omega-3 fatty acids (FISH OIL) 1000 MG Oral Cap Take 1,000 mg by mouth 2 (two) times daily.      Desloratadine (CLARINEX OR) Take 5 mg by mouth 2 (two) times daily.        PULMICORT FLEXHALER 180 MCG/ACT Inhalation Aerosol Powder, Breath Activated Inhale 2 puffs into the lungs 2 (two) times daily.      aspirin 81 MG Oral Tab Take 1 tablet (81 mg total) by mouth daily.       Azelastine-Fluticasone 137-50 MCG/ACT Nasal Suspension by Nasal route.      VITAMIN D OR Take 5,000 Units by mouth daily.      MSM OR Take 1,000 mg by mouth.        VITAMIN C 500 MG OR TABS 1 TABLET DAILY      SELENIUM 200 MCG OR TABS 2 qd       No current facility-administered medications on file prior to visit.       REVIEW OF SYSTEMS:  A 10-point system was reviewed.  Pertinent positives and negatives are noted in HPI.      PHYSICAL EXAMINATION:  VITAL SIGNS: /72   Pulse 66   LMP 10/13/2007   SpO2 96%     A&Ox3, no acute distress  PERRL, EOMi, FS, TM  Full strength x 4, no drift  Sensation intact     ASSESSMENT:  64yo female with an incidental right frontal convexity meningioma without brain edema    We reviewed the natural history of meningiomas.  I explained that the left leg symptoms she experiencing would be very consistent with the location of the meningioma although somewhat atypical given the lack of brain edema.  I explained that her cognitive changes and the whole body shaking episodes without loss of consciousness would be less likely attributable to this lesion.      We discussed the treatment options for meningioma including observation, radiation, and surgical resection.  She expressed that given the growth over the past 14 years and her left leg symptoms she is strongly considering surgery but would want to wait until January as she will be retiring at that time.  She is currently scheduled for an MRI brain in November as planned from her prior appointment.    Plan:  -Functional MRI ordered given proximity of the tumor to the central lobule, as needed for surgical planning, and will be added to the planned MRI brain with and without contrast  - Will plan to follow-up in clinic thereafter to review imaging and discuss treatment options further    Salazar Martinez MD  Neurological Surgery  [facility] Time: 45 min including face to  face time, chart review, imaging interpretation, and coordination of care

## 2024-09-05 NOTE — PATIENT INSTRUCTIONS
Refill policies:    Allow 2-3 business days for refills; controlled substances may take longer.  Contact your pharmacy at least 5 days prior to running out of medication and have them send an electronic request or submit request through the “request refill” option in your [employer] account.  Refills are not addressed on weekends; covering physicians do not authorize routine medications on weekends.  No narcotics or controlled substances are refilled after noon on Fridays or by on call physicians.  By law, narcotics must be electronically prescribed.  A 30 day supply with no refills is the maximum allowed.  If your prescription is due for a refill, you may be due for a follow up appointment.  To best provide you care, patients receiving routine medications need to be seen at least once a year.  Patients receiving narcotic/controlled substance medications need to be seen at least once every 3 months.  In the event that your preferred pharmacy does not have the requested medication in stock (e.g. Backordered), it is your responsibility to find another pharmacy that has the requested medication available.  We will gladly send a new prescription to that pharmacy at your request.    Scheduling Tests:    If your physician has ordered radiology tests such as MRI or CT scans, please contact Central Scheduling at [phone] right away to schedule the test.  Once scheduled, the [facility] Centralized Referral Team will work with your insurance carrier to obtain pre-certification or prior authorization.  Depending on your insurance carrier, approval may take 3-10 days.  It is highly recommended patients assure they have received an authorization before having a test performed.  If test is done without insurance authorization, patient may be responsible for the entire amount billed.      Precertification and Prior Authorizations:  If your physician has recommended that you have a procedure or additional testing performed the [facility]  Centralized Referral Team will contact your insurance carrier to obtain pre-certification or prior authorization.    You are strongly encouraged to contact your insurance carrier to verify that your procedure/test has been approved and is a COVERED benefit.  Although the [facility] Centralized Referral Team does its due diligence, the insurance carrier gives the disclaimer that \"Although the procedure is authorized, this does not guarantee payment.\"    Ultimately the patient is responsible for payment.   Thank you for your understanding in this matter.  Paperwork Completion:  If you require FMLA or disability paperwork for your recovery, please make sure to either drop it off or have it faxed to our office at [phone]. Be sure the form has your name and date of birth on it.  The form will be faxed to our Forms Department and they will complete it for you.  There is a 25$ fee for all forms that need to be filled out.  Please be aware there is a 10-14 day turnaround time.  You will need to sign a release of information (LESLEY) form if your paperwork does not come with one.  You may call the Forms Department with any questions at [phone].  Their fax number is [phone].

## 2024-09-10 ENCOUNTER — PATIENT MESSAGE (OUTPATIENT)
Dept: FAMILY MEDICINE CLINIC | Facility: CLINIC | Age: 64
End: 2024-09-10

## 2024-09-10 DIAGNOSIS — Z00.00 LABORATORY EXAM ORDERED AS PART OF ROUTINE GENERAL MEDICAL EXAMINATION: Primary | ICD-10-CM

## 2024-09-11 NOTE — TELEPHONE ENCOUNTER
Pt coming in 9/20 for physical Thyroid and vitamin D ordered by Dr. Oliver.    Pt had CMP and CBC in may.     Okay for CBC, CMP,lipid,A1c, B12?

## 2024-09-11 NOTE — TELEPHONE ENCOUNTER
From: Beulah Menard  To: Maria Luisa Sunshine  Sent: 9/10/2024 4:50 PM CDT  Subject: Order for labs for my physical on 9/22    Hi. Dr. Sunshine usually orders a lab workup before my physicals so that the results can be discussed at my physical. I thought she put orders in during my last physical in 2023, but according to  lab technicians, the orders are not in the system. Can the orders be put through by the 16th? I don't need the thyroid panel, as I just completed them for Dr. Oliver at the beginning of the month, and she put an order in for me to repeat them at the end of the month. Thank you.

## 2024-09-17 ENCOUNTER — LAB ENCOUNTER (OUTPATIENT)
Dept: LAB | Age: 64
End: 2024-09-17
Attending: FAMILY MEDICINE
Payer: COMMERCIAL

## 2024-09-17 DIAGNOSIS — E06.3 HYPOTHYROIDISM DUE TO HASHIMOTO'S THYROIDITIS: ICD-10-CM

## 2024-09-17 DIAGNOSIS — Z00.00 LABORATORY EXAM ORDERED AS PART OF ROUTINE GENERAL MEDICAL EXAMINATION: ICD-10-CM

## 2024-09-17 LAB
ALBUMIN SERPL-MCNC: 4.5 G/DL (ref 3.2–4.8)
ALBUMIN/GLOB SERPL: 1.5 {RATIO} (ref 1–2)
ALP LIVER SERPL-CCNC: 58 U/L
ALT SERPL-CCNC: 20 U/L
ANION GAP SERPL CALC-SCNC: 7 MMOL/L (ref 0–18)
AST SERPL-CCNC: 21 U/L (ref ?–34)
BASOPHILS # BLD AUTO: 0.04 X10(3) UL (ref 0–0.2)
BASOPHILS NFR BLD AUTO: 0.5 %
BILIRUB SERPL-MCNC: 0.5 MG/DL (ref 0.2–1.1)
BUN BLD-MCNC: 14 MG/DL (ref 9–23)
CALCIUM BLD-MCNC: 9.9 MG/DL (ref 8.7–10.4)
CHLORIDE SERPL-SCNC: 105 MMOL/L (ref 98–112)
CHOLEST SERPL-MCNC: 237 MG/DL (ref ?–200)
CO2 SERPL-SCNC: 27 MMOL/L (ref 21–32)
CREAT BLD-MCNC: 0.87 MG/DL
EGFRCR SERPLBLD CKD-EPI 2021: 74 ML/MIN/1.73M2 (ref 60–?)
EOSINOPHIL # BLD AUTO: 0.08 X10(3) UL (ref 0–0.7)
EOSINOPHIL NFR BLD AUTO: 1 %
ERYTHROCYTE [DISTWIDTH] IN BLOOD BY AUTOMATED COUNT: 13.9 %
EST. AVERAGE GLUCOSE BLD GHB EST-MCNC: 120 MG/DL (ref 68–126)
FASTING PATIENT LIPID ANSWER: YES
FASTING STATUS PATIENT QL REPORTED: YES
GLOBULIN PLAS-MCNC: 3 G/DL (ref 2–3.5)
GLUCOSE BLD-MCNC: 89 MG/DL (ref 70–99)
HBA1C MFR BLD: 5.8 % (ref ?–5.7)
HCT VFR BLD AUTO: 43.3 %
HDLC SERPL-MCNC: 58 MG/DL (ref 40–59)
HGB BLD-MCNC: 14.2 G/DL
IMM GRANULOCYTES # BLD AUTO: 0.03 X10(3) UL (ref 0–1)
IMM GRANULOCYTES NFR BLD: 0.4 %
LDLC SERPL CALC-MCNC: 162 MG/DL (ref ?–100)
LYMPHOCYTES # BLD AUTO: 2.32 X10(3) UL (ref 1–4)
LYMPHOCYTES NFR BLD AUTO: 28.7 %
MCH RBC QN AUTO: 29.8 PG (ref 26–34)
MCHC RBC AUTO-ENTMCNC: 32.8 G/DL (ref 31–37)
MCV RBC AUTO: 91 FL
MONOCYTES # BLD AUTO: 0.91 X10(3) UL (ref 0.1–1)
MONOCYTES NFR BLD AUTO: 11.3 %
NEUTROPHILS # BLD AUTO: 4.7 X10 (3) UL (ref 1.5–7.7)
NEUTROPHILS # BLD AUTO: 4.7 X10(3) UL (ref 1.5–7.7)
NEUTROPHILS NFR BLD AUTO: 58.1 %
NONHDLC SERPL-MCNC: 179 MG/DL (ref ?–130)
OSMOLALITY SERPL CALC.SUM OF ELEC: 288 MOSM/KG (ref 275–295)
PLATELET # BLD AUTO: 309 10(3)UL (ref 150–450)
POTASSIUM SERPL-SCNC: 4.4 MMOL/L (ref 3.5–5.1)
PROT SERPL-MCNC: 7.5 G/DL (ref 5.7–8.2)
RBC # BLD AUTO: 4.76 X10(6)UL
SODIUM SERPL-SCNC: 139 MMOL/L (ref 136–145)
TRIGL SERPL-MCNC: 99 MG/DL (ref 30–149)
VIT B12 SERPL-MCNC: 1109 PG/ML (ref 211–911)
VLDLC SERPL CALC-MCNC: 19 MG/DL (ref 0–30)
WBC # BLD AUTO: 8.1 X10(3) UL (ref 4–11)

## 2024-09-17 PROCEDURE — 80061 LIPID PANEL: CPT

## 2024-09-17 PROCEDURE — 83036 HEMOGLOBIN GLYCOSYLATED A1C: CPT

## 2024-09-17 PROCEDURE — 82607 VITAMIN B-12: CPT

## 2024-09-17 PROCEDURE — 85025 COMPLETE CBC W/AUTO DIFF WBC: CPT

## 2024-09-17 PROCEDURE — 80053 COMPREHEN METABOLIC PANEL: CPT

## 2024-09-20 ENCOUNTER — OFFICE VISIT (OUTPATIENT)
Dept: FAMILY MEDICINE CLINIC | Facility: CLINIC | Age: 64
End: 2024-09-20
Payer: COMMERCIAL

## 2024-09-20 VITALS
HEART RATE: 85 BPM | RESPIRATION RATE: 16 BRPM | DIASTOLIC BLOOD PRESSURE: 86 MMHG | HEIGHT: 66 IN | WEIGHT: 168 LBS | BODY MASS INDEX: 27 KG/M2 | OXYGEN SATURATION: 97 % | SYSTOLIC BLOOD PRESSURE: 136 MMHG

## 2024-09-20 DIAGNOSIS — Z00.00 ANNUAL PHYSICAL EXAM: Primary | ICD-10-CM

## 2024-09-20 PROCEDURE — 99397 PER PM REEVAL EST PAT 65+ YR: CPT | Performed by: FAMILY MEDICINE

## 2024-09-20 RX ORDER — LACOSAMIDE 50 MG/1
50 TABLET ORAL 2 TIMES DAILY
COMMUNITY
Start: 2024-09-03

## 2024-09-20 RX ORDER — LEVALBUTEROL TARTRATE 45 UG/1
AEROSOL, METERED ORAL EVERY 4 HOURS PRN
COMMUNITY

## 2024-09-20 NOTE — PROGRESS NOTES
HPI:   Beulah Menard is a 63 year old female who presents for a complete physical exam.     Wt Readings from Last 6 Encounters:   09/20/24 168 lb (76.2 kg)   08/06/24 168 lb (76.2 kg)   05/17/24 168 lb (76.2 kg)   05/02/24 168 lb (76.2 kg)   04/15/24 168 lb (76.2 kg)   09/29/23 168 lb (76.2 kg)     Body mass index is 27.12 kg/m².     Cholesterol, Total (mg/dL)   Date Value   09/17/2024 237 (H)   09/25/2023 229 (H)   09/19/2022 219 (H)   09/15/2014 231 (H)   10/17/2013 216 (H)   10/20/2012 217 (H)     HDL Cholesterol (mg/dL)   Date Value   09/17/2024 58   09/25/2023 58   09/19/2022 56   09/15/2014 59   10/17/2013 62   10/20/2012 57     LDL Cholesterol Calc (mg/dL)   Date Value   09/15/2014 152 (H)   10/17/2013 141 (H)   10/20/2012 126 (H)     LDL Cholesterol (mg/dL)   Date Value   09/17/2024 162 (H)   09/25/2023 149 (H)   09/19/2022 145 (H)     Triglycerides (mg/dL)   Date Value   09/15/2014 99   10/17/2013 65   10/20/2012 170 (H)     AST (SGOT) (IU/L)   Date Value   09/15/2014 21   10/17/2013 15   10/20/2012 23     AST (U/L)   Date Value   09/17/2024 21   05/02/2024 20   09/25/2023 16   03/04/2021 19   03/08/2019 20     ALT (SGPT) (IU/L)   Date Value   09/15/2014 23   10/17/2013 17   10/20/2012 20     ALT (U/L)   Date Value   09/17/2024 20   05/02/2024 35   09/25/2023 31   03/04/2021 20   03/08/2019 33       last pap- sees gyn Schrieber  H/o abn pap - cryotherapy   No vaginal discharge  No bladder dysfunction  Doing well in pelvic floor therapy   Last menses age 48  + performing self breast exams  last mammogram- 2023; h/o abn mammograms   dexa - 2021   + calcium and vit D supplementation  No family history of breast colon or ovarian cancer  c-scope UTD with Dr. Ramirez      Seeing cardiology Dr. Gonzalez   Pt sees psych for ADD   Pt sees  for allergies  Pt sees Dr. Oquendo derm    Pt sees [facility] for meningioma   Pt has had progressive seizures - now on vimpat / surgery will be in January at [facility]   [facility]     Pt uses skelaxin and myofascial release for cervical disc issues C6-7/ L4-S1/ has not needed for a long time       Current Outpatient Medications   Medication Sig Dispense Refill    Levalbuterol Tartrate 45 MCG/ACT Inhalation Aerosol Inhale into the lungs every 4 (four) hours as needed for Wheezing.      lacosamide 50 MG Oral Tab Take 1 tablet (50 mg total) by mouth 2 (two) times daily.      dicyclomine 10 MG Oral Cap Take 1 capsule (10 mg total) by mouth every 6 (six) hours as needed.      SYNTHROID 75 MCG Oral Tab Take 1 tablet (75 mcg total) by mouth before breakfast.      liothyronine 5 MCG Oral Tab Take 1 tablet (5 mcg total) by mouth daily. 90 tablet 1    Coenzyme Q10 (CO Q 10) 100 MG Oral Cap Take 1 Capful by mouth daily.      RESVERATROL OR Take 1 tablet by mouth daily.      AZSTARYS 52.3-10.4 MG Oral Cap       AZSTARYS 26.1-5.2 MG Oral Cap       Lactobacillus (PROBIOTIC ACIDOPHILUS OR) Take by mouth.      estradiol 0.05 MG/24HR Transdermal Patch Biweekly Place 1 patch onto the skin twice a week. 24 patch 4    progesterone 100 MG Oral Cap Take 1 capsule (100 mg total) by mouth nightly. 90 capsule 4    Levalbuterol HCl 0.63 MG/3ML Inhalation Nebu Soln as needed.        Magnesium Gluconate 250 MG Oral Tab Take 750 mg by mouth.      omega-3 fatty acids (FISH OIL) 1000 MG Oral Cap Take 1,000 mg by mouth 2 (two) times daily.      Desloratadine (CLARINEX OR) Take 5 mg by mouth 2 (two) times daily.        PULMICORT FLEXHALER 180 MCG/ACT Inhalation Aerosol Powder, Breath Activated Inhale 2 puffs into the lungs 2 (two) times daily.      aspirin 81 MG Oral Tab Take 1 tablet (81 mg total) by mouth daily.      Azelastine-Fluticasone 137-50 MCG/ACT Nasal Suspension by Nasal route.      VITAMIN D OR Take 5,000 Units by mouth daily.      MSM OR Take 1,000 mg by mouth.        VITAMIN C 500 MG OR TABS 1 TABLET DAILY      SELENIUM 200 MCG OR TABS 2 qd        Past Medical History:    ADHD (attention deficit  hyperactivity disorder)    Anesthesia complication    prolonged awakening from Morphine    AR (allergic rhinitis)    Arrhythmia    PVC's    Asthma (HCC)    BRCA1 negative    BRCA2 negative    Diverticulitis    10/2020, 2021    Diverticulosis    DJD (degenerative joint disease)    Hashimoto's thyroiditis    Hemangioma    brain    Herniated disc, cervical    History of blood transfusion        Lactose intolerance    Meningioma (HCC)    Migraine headache    Primary osteoarthritis of right knee    PVC (premature ventricular contraction)    Seizure disorder (HCC)    Spinal stenosis    CERVICAL MRI    Transfusion    tubal pregnancy      Past Surgical History:   Procedure Laterality Date    Appendectomy        ,    TWO, EACH TIME TWINS.    Cholecystectomy      Colonoscopy      diverticulosis, and internal hemorrhoids    Colonoscopy      Colonoscopy N/A 3/22/2019    Procedure: COLONOSCOPY, POSSIBLE BIOPSY, POSSIBLE POLYPECTOMY 42998;  Surgeon: Clayton Ramirez MD;  Location: [facility]    Colonoscopy N/A 2024    Procedure: COLONOSCOPY;  Surgeon: Clayton Ramirez MD;  Location:  ENDOSCOPY    Hysteroscopy  2018    S&N, R/S of Endometrial Polyp    Other surgical history      BMT    Other surgical history      ECTOPIC  PREGNANCY REMOVAL    Other surgical history      ENDOSCOPIC  SINUS    Other surgical history      L  WRIST  PISIFORM-X  D/T  FX    Tonsillectomy        Family History   Problem Relation Age of Onset    Cancer Mother         NHL and pancreatic cancer    Hypertension Mother     Cancer Other         colon cancer in maternal aunt and maternal 1st cousin    Heart Disorder Father         1)PACER   2)AF  3)CHF    Hypertension Father     Other (Other) Daughter         1)CHURG-STRAUSSE  2)ASTHMA    Other (Other) Son         ASTHMA    Heart Disorder Maternal Grandfather         MI AT 66    Heart Disorder Paternal Grandfather         CHF     Hypertension Sister     Other (SLE) Sister     Other (HYPOTHYROID) Sister     Psychiatric Son         ASPERGER'S    Psychiatric Son         ADD    Hypertension Sister     Other (SJOGERN'S) Sister     Psychiatric Brother         ADHD     Breast Cancer Cousin 35        P 1st cousin- dx age 35    Breast Cancer Paternal Aunt 64        dx age- 64    Ovarian Cancer Paternal Cousin Female     Other (Pancreatic Cancer) Maternal Aunt         stage 4 metastatic      Social History:   Social History     Socioeconomic History    Marital status:      Spouse name: JUAN    Number of children: 4    Years of education: PSY. D.   Occupational History    Occupation: NEUROPSYCHOLOGIST     Comment: SELF   Tobacco Use    Smoking status: Never    Smokeless tobacco: Never   Vaping Use    Vaping status: Never Used   Substance and Sexual Activity    Alcohol use: Yes     Alcohol/week: 0.0 standard drinks of alcohol     Comment: Occ glass of wine 2x/mo    Drug use: No     Comment: MARIJUANA 30 YRS. AGO W/O ABUSE/XS.    Sexual activity: Yes     Partners: Male   Other Topics Concern     Service No    Blood Transfusions Yes     Comment: 1993    Caffeine Concern No     Comment: 0-1 SERVING/D    Weight Concern No    Special Diet No    Exercise Yes     Comment: QD, AEROBIXC    Bike Helmet Yes    Seat Belt Yes   Social History Narrative    LIVES WITH SPOUSE AND TWO CHILDREN IN SINGLE FAMILY HOME.     Social Determinants of Health     Food Insecurity: No Food Insecurity (7/23/2024)    Received from [facility]    Hunger Vital Sign     Worried About Running Out of Food in the Last Year: Never true     Ran Out of Food in the Last Year: Never true   Transportation Needs: No Transportation Needs (7/23/2024)    Received from [facility]    PRAPARE - Transportation     Lack of Transportation (Medical): No     Lack of Transportation (Non-Medical): No   Physical Activity: Sufficiently Active (7/23/2024)    Received from [facility]     Exercise Vital Sign     Days of Exercise per Week: 5 days     Minutes of Exercise per Session: 40 min   Housing Stability: Low Risk  (7/23/2024)    Received from [facility]    Housing Stability     What is your living situation today?: I have a steady place to live     Occ: . : . Children: 4 - 2 sets of twins    Pt works in mental health  Exercise: three times per week.  Diet: watches calories closely     REVIEW OF SYSTEMS:   GENERAL: feels well otherwise  SKIN: denies any unusual skin lesions  EYES:denies blurred vision or double vision  HEENT: denies nasal congestion, sinus pain or ST  LUNGS: denies shortness of breath with exertion  CARDIOVASCULAR: denies chest pain on exertion  GI: denies abdominal pain,denies heartburn  : denies dysuria, vaginal discharge or itching   MUSCULOSKELETAL: denies back pain  NEURO: denies headaches  PSYCHE: denies depression or anxiety  HEMATOLOGIC: denies hx of anemia  ENDOCRINE: thyroid history  ALL/ASTHMA: denies asthma ; severe allergies     EXAM:   /86   Pulse 85   Resp 16   Ht 5' 6\" (1.676 m)   Wt 168 lb (76.2 kg)   LMP 10/13/2007   SpO2 97%   BMI 27.12 kg/m²   Body mass index is 27.12 kg/m².   GENERAL: alert and oriented X 3, well developed, well nourished,in no apparent distress  CARDIO: RRR without murmur  LUNGS: clear to auscultation  NECK: supple,no adenopathy, + thyromegaly  HEENT: atraumatic, normocephalic,ears and throat are clear  EYES:PERRLA, EOMI, normal,conjunctiva are clear  SKIN: norashes,no suspicious lesions  GI: good BS's,, HSM or tenderness  BREAST: no axillary LAD no masses no nipple discharge bilaterally   CHEST: no chest tenderness  EXTREMITIES: no cyanosis, clubbing or edema  NEURO: cranial nerves are intact,motor and sensory are grossly intact    ASSESSMENT AND PLAN:   Beulah Menard is a 63 year old female who presents for annual physical     1. Annual physical exam  Discussed labs and all recent consults        Discussed  diet, exercise,calcium, vitamin D, fish oil and self breast exams.   Questions answered and patient indicates understanding of these issues and agrees to the plan.  Follow up in 1 year or sooner if needed.

## 2024-09-27 ENCOUNTER — LAB ENCOUNTER (OUTPATIENT)
Dept: LAB | Age: 64
End: 2024-09-27
Attending: STUDENT IN AN ORGANIZED HEALTH CARE EDUCATION/TRAINING PROGRAM
Payer: COMMERCIAL

## 2024-09-27 ENCOUNTER — PATIENT MESSAGE (OUTPATIENT)
Facility: CLINIC | Age: 64
End: 2024-09-27

## 2024-09-27 DIAGNOSIS — E06.3 HYPOTHYROIDISM DUE TO HASHIMOTO'S THYROIDITIS: ICD-10-CM

## 2024-09-27 LAB
T3 SERPL-MCNC: 1.23 NG/ML (ref 0.6–1.81)
T4 FREE SERPL-MCNC: 1.3 NG/DL (ref 0.8–1.7)
TSI SER-ACNC: 0.72 MIU/ML (ref 0.55–4.78)

## 2024-09-27 PROCEDURE — 84439 ASSAY OF FREE THYROXINE: CPT

## 2024-09-27 PROCEDURE — 84443 ASSAY THYROID STIM HORMONE: CPT

## 2024-09-27 PROCEDURE — 84480 ASSAY TRIIODOTHYRONINE (T3): CPT

## 2024-09-27 NOTE — TELEPHONE ENCOUNTER
From: Beulah Menard  To: Courtney Oliver  Sent: 9/27/2024 9:59 AM CDT  Subject: Started Vimpat 5 wks ago    Hi. You asked that I do my thyroid labs 4-6 wks after starting Vimpat (lacosimide). I started the med 5 weeks ago, KUMAR.

## 2024-09-27 NOTE — TELEPHONE ENCOUNTER
[employer] message response sent to patient.    Routed to Dr. Oliver as FYI.    Will await lab results.    Closing this encounter.

## 2024-11-18 ENCOUNTER — HOSPITAL ENCOUNTER (OUTPATIENT)
Dept: MRI IMAGING | Facility: HOSPITAL | Age: 64
Discharge: HOME | End: 2024-11-18
Attending: NEUROLOGICAL SURGERY
Payer: MEDICARE

## 2024-11-18 DIAGNOSIS — D32.9 MENINGIOMA (HCC): ICD-10-CM

## 2024-11-18 PROCEDURE — 70553 MRI BRAIN STEM W/O & W/DYE: CPT | Performed by: NEUROLOGICAL SURGERY

## 2024-11-18 PROCEDURE — A9575 INJ GADOTERATE MEGLUMI 0.1ML: HCPCS | Performed by: NEUROLOGICAL SURGERY

## 2024-11-18 RX ORDER — GADOTERATE MEGLUMINE 376.9 MG/ML
15 INJECTION INTRAVENOUS
Status: COMPLETED | OUTPATIENT
Start: 2024-11-18 | End: 2024-11-18

## 2024-11-18 RX ADMIN — GADOTERATE MEGLUMINE 24 ML: 376.9 INJECTION INTRAVENOUS at 18:10:00

## 2024-11-19 ENCOUNTER — TELEPHONE (OUTPATIENT)
Dept: SURGERY | Facility: CLINIC | Age: 64
End: 2024-11-19

## 2024-11-19 NOTE — TELEPHONE ENCOUNTER
Spoke to the patient about the imaging findings.  She plans to have surgery at [facility] where she has transitioned her care.

## 2024-11-26 ENCOUNTER — OFFICE VISIT (OUTPATIENT)
Facility: CLINIC | Age: 64
End: 2024-11-26
Payer: MEDICARE

## 2024-11-26 VITALS
OXYGEN SATURATION: 96 % | HEART RATE: 96 BPM | SYSTOLIC BLOOD PRESSURE: 134 MMHG | HEIGHT: 66 IN | BODY MASS INDEX: 26.36 KG/M2 | WEIGHT: 164 LBS | DIASTOLIC BLOOD PRESSURE: 80 MMHG

## 2024-11-26 DIAGNOSIS — R35.0 INCREASED FREQUENCY OF URINATION: Primary | ICD-10-CM

## 2024-11-26 NOTE — PATIENT INSTRUCTIONS
Visit Summary  Sodium the morning after you have increased urination. I will be in touch with next steps  Please touch base with your other providers regarding your symptoms     General follow up information:  Please let us know if you require any refills at least 1 week prior to your medication running out. If you do run out of medication, please call our office ASAP to request refills (do not wait until your follow up).  Please call us if you experience any problems with insurance coverage of medication, lab work, or imaging.   The on-call pager is for urgent matters only. If you are a type 1 diabetic and run out of insulin after business hours 8AM-4PM, you may call the on-call pager for a refill to a 24 hour pharmacy. If you have adrenal insufficiency and run out of steroids, you may call the on-call pager for a refill to a 24 hours pharmacy. All other refill requests should be requested during business hours.    Return Visit   []  Follow up as scheduled   [] Virtual visit  [] No follow up appointment needed  [] Follow up to be scheduled pending      [x]  Fasting/8AM labs  []  Central scheduling # for ultrasound/nuclear med/CT/MRI/DXA/IR  []  Provide flyer for:  [] ENT   [] Weight Management  [] Infertility/Reproductive Endocrinology  [] Transgender care  []  Directions to 1st floor lab  [] Collect urine collection jug  [] Collect salivary cortisol tubes  []  Dental clearance form  []  Will need authorization for outside records

## 2024-11-26 NOTE — PROGRESS NOTES
ENDOCRINOLOGY, DIABETES, & METABOLISM NOTE     Subjective:   Beulah Menard is a 64 year old female who presents regarding hypothyroidism and osteopenia.     Initial HPI 2/2024  Patient is a former patient of Dr. Tavarez and saw Dr. Patel (Ruth) on 9/2023.   She presents today to discuss her hypothyroidism 2/2 hashimotos and osteopenia.     Hashimotos Hypothyroidism:  Diagnosed with hypothyroidism in 2008 and then started on estrogen/progesterone since 2015 and notes TFTs have remained stable  COVID 1/2023; had post-covid symptoms and asthma/steroids for 2 months (ended around 3/2023)  Was on LT4 75mcg daily and added cytomel 5 mcg 6 days a week around 6/2/2023 with Dr. Tavarez  Around 12/2023 noted hair loss, constipation, mid day fatigue, word finding difficulty, dry skin, palpitations, muscle pain in her legs. Had labs done and FT3 low, recommended to increase Cytomel to 5 mcg 7 days a week  Notes improvement in her symptoms   She is currently on LT4 75 mcg + Cytomel 5 mcg daily without any symptoms     Osteopenia:  -2015 had FOOSH with fracture and surgery   -Takes vitamin d 10,000 and MSM (magnesium, calcium, glucosamine); unsure of dose   -Adequate dietary calcium  -Denies family hx of osteoporosis   -Denies hx of kidney stones, previous hx of calcium problems  -Denies excessive ETOH use  -Drinks tea daily   -Menopause was at age 48; currently on estrogen patch as above    8/2024 8/2024 TSH 0.927, FT4 1.3, total T3 1.07  Currently taking Thryoid Meds: liothyronine Tabs - 5 MCG + Synthroid 75 mcg daily   Has been feeling well from a thyroid standpoint   Underwent MRI 5/2024 and was found to have a 2.5 cm tumor and saw [place] for meningioma, planning on surgery 1/2025  Has a hx of focal motor seizure 8/3, prescribed Vimpat but has not started yet.     11/26/2024  Labs: 9/2024 TSH 0.722, FT4 1.3, TT3 1.23, A1c 5.8, Na 139  Started on Vimpat since LOV  Notes increased urination overnight since 2023  Drinks  24 ounces-48 ounces and notes increased urination overnight; notes episodes are recently more frequent around once a month    History/Other:    Chief Complaint Reviewed and Verified  Nursing Notes Reviewed and   Verified  Tobacco Reviewed  Allergies Reviewed  Medications Reviewed    Problem List Reviewed  Medical History Reviewed  Surgical History   Reviewed  OB Status Reviewed  Family History Reviewed         Tobacco:  She has never smoked tobacco.    Current Outpatient Medications   Medication Sig Dispense Refill    Levalbuterol Tartrate 45 MCG/ACT Inhalation Aerosol Inhale into the lungs every 4 (four) hours as needed for Wheezing.      lacosamide 50 MG Oral Tab Take 1 tablet (50 mg total) by mouth 2 (two) times daily.      SYNTHROID 75 MCG Oral Tab Take 1 tablet (75 mcg total) by mouth before breakfast.      liothyronine 5 MCG Oral Tab Take 1 tablet (5 mcg total) by mouth daily. 90 tablet 1    Coenzyme Q10 (CO Q 10) 100 MG Oral Cap Take 1 Capful by mouth daily.      RESVERATROL OR Take 1 tablet by mouth daily.      AZSTARYS 52.3-10.4 MG Oral Cap       AZSTARYS 26.1-5.2 MG Oral Cap       Lactobacillus (PROBIOTIC ACIDOPHILUS OR) Take by mouth.      estradiol 0.05 MG/24HR Transdermal Patch Biweekly Place 1 patch onto the skin twice a week. 24 patch 4    progesterone 100 MG Oral Cap Take 1 capsule (100 mg total) by mouth nightly. 90 capsule 4    Levalbuterol HCl 0.63 MG/3ML Inhalation Nebu Soln as needed.        Magnesium Gluconate 250 MG Oral Tab Take 750 mg by mouth.      omega-3 fatty acids (FISH OIL) 1000 MG Oral Cap Take 1,000 mg by mouth 2 (two) times daily.      Desloratadine (CLARINEX OR) Take 5 mg by mouth 2 (two) times daily.        PULMICORT FLEXHALER 180 MCG/ACT Inhalation Aerosol Powder, Breath Activated Inhale 2 puffs into the lungs 2 (two) times daily.      aspirin 81 MG Oral Tab Take 1 tablet (81 mg total) by mouth daily.      Azelastine-Fluticasone 137-50 MCG/ACT Nasal Suspension by Nasal  route.      VITAMIN D OR Take 5,000 Units by mouth daily.      MSM OR Take 1,000 mg by mouth.        VITAMIN C 500 MG OR TABS 1 TABLET DAILY      SELENIUM 200 MCG OR TABS 2 qd       Allergies   Allergen Reactions    [place] OTHER (SEE COMMENTS)     All trees    Ciprofloxacin OTHER (SEE COMMENTS)     Cognitive changes with oral use    Mold     Morphine NAUSEA AND VOMITING    Tree, Elm UNKNOWN     All trees     Past Medical History:    ADHD (attention deficit hyperactivity disorder)    Anesthesia complication    prolonged awakening from Morphine    AR (allergic rhinitis)    Arrhythmia    PVC's    Asthma (HCC)    BRCA1 negative    BRCA2 negative    Diverticulitis    10/2020, 2021    Diverticulosis    DJD (degenerative joint disease)    Hashimoto's thyroiditis    Hemangioma    brain    Herniated disc, cervical    History of blood transfusion        Lactose intolerance    Meningioma (HCC)    Migraine headache    Primary osteoarthritis of right knee    PVC (premature ventricular contraction)    Seizure disorder (HCC)    Spinal stenosis    CERVICAL MRI    Transfusion    tubal pregnancy      Past Surgical History:   Procedure Laterality Date    Appendectomy        ,    TWO, EACH TIME TWINS.    Cholecystectomy      Colonoscopy      diverticulosis, and internal hemorrhoids    Colonoscopy      Colonoscopy N/A 3/22/2019    Procedure: COLONOSCOPY, POSSIBLE BIOPSY, POSSIBLE POLYPECTOMY 58236;  Surgeon: Clayton Ramirez MD;  Location: [facility]    Colonoscopy N/A 2024    Procedure: COLONOSCOPY;  Surgeon: Clayton Ramirez MD;  Location:  ENDOSCOPY    Hysteroscopy  2018    S&N, R/S of Endometrial Polyp    Other surgical history      BMT    Other surgical history      ECTOPIC  PREGNANCY REMOVAL    Other surgical history      ENDOSCOPIC  SINUS    Other surgical history      L  WRIST  PISIFORM-X  D/T  FX    Tonsillectomy       Social History      Socioeconomic History    Marital status:      Spouse name: JUAN    Number of children: 4    Years of education: PSY. D.   Occupational History    Occupation: NEUROPSYCHOLOGIST     Comment: SELF   Tobacco Use    Smoking status: Never    Smokeless tobacco: Never   Vaping Use    Vaping status: Never Used   Substance and Sexual Activity    Alcohol use: Yes     Alcohol/week: 0.0 standard drinks of alcohol     Comment: Occ glass of wine 2x/mo    Drug use: No     Comment: MARIJUANA 30 YRS. AGO W/O ABUSE/XS.    Sexual activity: Yes     Partners: Male   Other Topics Concern     Service No    Blood Transfusions Yes     Comment: 1993    Caffeine Concern No     Comment: 0-1 SERVING/D    Weight Concern No    Special Diet No    Exercise Yes     Comment: QD, AEROBIXC    Bike Helmet Yes    Seat Belt Yes   Social History Narrative    LIVES WITH SPOUSE AND TWO CHILDREN IN SINGLE FAMILY HOME.     Social Drivers of Health     Food Insecurity: No Food Insecurity (7/23/2024)    Received from [facility]    Hunger Vital Sign     Worried About Running Out of Food in the Last Year: Never true     Ran Out of Food in the Last Year: Never true   Transportation Needs: No Transportation Needs (7/23/2024)    Received from [facility]    PRAPARE - Transportation     Lack of Transportation (Medical): No     Lack of Transportation (Non-Medical): No   Physical Activity: Sufficiently Active (7/23/2024)    Received from [facility]    Exercise Vital Sign     Days of Exercise per Week: 5 days     Minutes of Exercise per Session: 40 min   Housing Stability: Low Risk  (7/23/2024)    Received from [facility]    Housing Stability     What is your living situation today?: I have a steady place to live     Family History   Problem Relation Age of Onset    Cancer Mother         NHL and pancreatic cancer    Hypertension Mother     Cancer Other         colon cancer in maternal aunt and maternal 1st cousin    Heart Disorder Father          1)PACER   2)AF  3)CHF    Hypertension Father     Other (Other) Daughter         1)CHURG-STRAUSSE  2)ASTHMA    Other (Other) Son         ASTHMA    Heart Disorder Maternal Grandfather         MI AT 66    Heart Disorder Paternal Grandfather         CHF    Hypertension Sister     Other (SLE) Sister     Other (HYPOTHYROID) Sister     Psychiatric Son         ASPERGER'S    Psychiatric Son         ADD    Hypertension Sister     Other (SJOGERN'S) Sister     Psychiatric Brother         ADHD     Breast Cancer Cousin 35        P 1st cousin- dx age 35    Breast Cancer Paternal Aunt 64        dx age- 64    Ovarian Cancer Paternal Cousin Female     Other (Pancreatic Cancer) Maternal Aunt         stage 4 metastatic         Review of Systems:  10 point ROS completed, refer to HPI for pertinent positives    Objective:   /80   Pulse 96   Ht 5' 6\" (1.676 m)   Wt 164 lb (74.4 kg)   LMP 10/13/2007   SpO2 96%   BMI 26.47 kg/m²  Estimated body mass index is 26.47 kg/m² as calculated from the following:    Height as of this encounter: 5' 6\" (1.676 m).    Weight as of this encounter: 164 lb (74.4 kg).  CONSTITUTIONAL:  awake, alert, cooperative, no apparent distress, and appears stated age    PSYCH: normal affect  LUNGS: breathing comfortably  CARDIOVASCULAR:  regular rate   NECK:  no palpable thyroid nodules        Laboratory Data     Recent Labs: Labs reviewed in [facility] under lab tab on 11/26/2024. Interpretation: TFTs stable 10/2023 with low normal TSH, 12/2023 TSH WNL with low ft3, repeat levels 2/2024-present WNL    Thyroid:    Lab Results   Component Value Date    TSH 0.722 09/27/2024    TSH 0.927 08/12/2024    TSH 1.320 05/02/2024    T4F 1.3 09/27/2024    T4F 1.3 08/12/2024    T4F 1.0 05/02/2024     Bones:  Lab Results   Component Value Date    VITD 58.8 08/12/2024    VITD 41.9 12/18/2023    VITD 48.6 09/25/2023        Imaging     Radiology: Personally reviewed pertinent imaging   I reviewed the patient's records from  outside facility: osteopenia of femoral neck 7/2023 11/18/2024 MRI Brain  -2.5x2.1x1.4 cm meningioma overlying superior posterior lateral margin of right frontal lobe    5/15/2024 MRI Brain  -2.5 cm meningioma along high right frontal convexity     7/5/2023 DXA  Bone Density:   -----------------------------------------------------------------   Region                  BMD    T-score  Z-score   Classification   -----------------------------------------------------------------   AP Spine (L1, L2, L3)         1.010   -0.1      1.6     Normal   Femoral Neck (Left)      0.706   -1.3      0.2     Osteopenia   Total Hip (Left)         0.902   -0.3      0.9     Normal   -----------------------------------------------------------------     4/2021 THYROID US  FINDINGS:  Right thyroid lobe:  Measures 4.6 x 1.2 x 1.4 cm (length x transverse x transverse).  Previously described a subcentimeter hyperechoic nodule in the posterior  interpolar region is today only vaguely suggested, poorly delineated from the  surrounding markedly heterogeneous parenchyma, but without significant  alteration in size or appearance when accounting for different equipment.  The thyroid isthmus measures 0.3 cm in thickness.     Left thyroid lobe:  Measures 4.4 x 1.0 x 1.3 cm (length x transverse x transverse).  Interpolar hypoechoic 0.7 cm nodule is also less conspicuous on today's exam.  Diffusely heterogeneous appearance of the parenchyma is unchanged. There are no  new dominant lesions.  There is no regional cervical lymphadenopathy by size criteria, including  incidentally noted level 2B left lymph node and small lymph node inferior to the  left lower thyroid pole both measuring less than a centimeter in short axis.    IMPRESSION:  Overall, there is no significant alteration when compared to prior studies  dating back to 10/14/2015.    ASSESSMENT/PLAN   Assessment & Plan:     Beulah Menard is a 64 year old female who presented to clinic  for:    Increased Urination  Lab Results   Component Value Date     09/17/2024     05/02/2024   Patient has noted episodes of increased nocturia since 2023 that typically present once every 2-3 months. More recently, patient has noted episodes once a month. She denies increased urination throughout the day but notes urinating multiple times throughout the night. She did have a recent urinary infection in October. She has a hx of meningioma, last MRI 11/2024 which has remained stable in size. No comment regarding pituitary.   Reviewed pathophysiology of ADH, initial testing, dynamic testing, and goals of therapy  Discussed behavior changes including reducing caffeine intake and limiting bedtime water intake. Patient to also keep a diary to see if changes have improved her symptoms  She will complete a fasting BMP the morning after her episode of nocturia  She will inform her other providers regarding above symptoms to see if further testing is warranted        Hypothyroidism due to Hashimoto's thyroiditis   Lab Results   Component Value Date    TSH 0.722 09/27/2024    T4F 1.3 09/27/2024   Pathophysiology of condition, goals of therapy,  d/w pt in detail.   Clinical significance of thyroid testing discussed, including goal of biochemical euthyroidism as determined by TSH level  Patient is currently clinically euthyroid   Of note, she is on vimpat  Repeat TFTs prior to follow up  Patient to inform me if stopping estrogen   Continue Synthroid 75 mcg daily + Cytomel 5 mcg daily     Thyroid nodules  Discussed that patient has had stable nodules on US with thyroid US follow up from [id number]  Discussed repeating US since it has been 2 years vs. Obtaining imaging if symptoms arise   3/2024 Thyroid US: 9x7x8 mm RMP TR3 nodule and 7x4x5 mm LLP TR3 nodule, stable in size   Will follow up in 1 year (around 2/2025) to discuss if symptomatic     Osteopenia  FRAX below treatment threshold on DXA 7/2023  Patient to  continue current vitamin D supplementation with goal vitamin D above 30  Patient to continue 1200 mg of calcium intake (supplement + diet) daily  Discussed fall precautions and weight-bearing exercise   Due for repeat DXA 7/2025    RTC 3 months    The above plan was discussed in detail with the patient who verbalized understanding and agreement.      A total of 30 minutes was spent today on obtaining history, reviewing pertinent labs, reviewing relevant pathophysiology with patient, reviewing outside records, evaluating patient, providing multiple treatment options, completing documentation and orders, and communicating with other providers as appropriate.     Courtney Oliver DO  [facility]  11/26/2024     In reviewing this note, please be advised that Dragon Voice Recognition software used to dictate the note may have made errors in recognizing some of the words or phrases.     Note to patient: The 21 Century Cures Act makes medical notes like these available to patients in the interest of transparency. However, be advised this is a medical document. It is intended as peer to peer communication. It is written in medical language and may contain abbreviations or verbiage that are unfamiliar. It may appear blunt or direct. Medical documents are intended to carry relevant information, facts as evident, and the clinical opinion of the practitioner.

## 2024-12-09 ENCOUNTER — LAB ENCOUNTER (OUTPATIENT)
Dept: LAB | Age: 64
End: 2024-12-09
Attending: STUDENT IN AN ORGANIZED HEALTH CARE EDUCATION/TRAINING PROGRAM
Payer: MEDICARE

## 2024-12-09 LAB
ANION GAP SERPL CALC-SCNC: 4 MMOL/L (ref 0–18)
BUN BLD-MCNC: 18 MG/DL (ref 9–23)
CALCIUM BLD-MCNC: 9.8 MG/DL (ref 8.7–10.4)
CHLORIDE SERPL-SCNC: 108 MMOL/L (ref 98–112)
CO2 SERPL-SCNC: 30 MMOL/L (ref 21–32)
CREAT BLD-MCNC: 0.91 MG/DL
EGFRCR SERPLBLD CKD-EPI 2021: 70 ML/MIN/1.73M2 (ref 60–?)
FASTING STATUS PATIENT QL REPORTED: YES
GLUCOSE BLD-MCNC: 84 MG/DL (ref 70–99)
OSMOLALITY SERPL CALC.SUM OF ELEC: 295 MOSM/KG (ref 275–295)
POTASSIUM SERPL-SCNC: 4 MMOL/L (ref 3.5–5.1)
SODIUM SERPL-SCNC: 142 MMOL/L (ref 136–145)

## 2024-12-09 PROCEDURE — 36415 COLL VENOUS BLD VENIPUNCTURE: CPT | Performed by: STUDENT IN AN ORGANIZED HEALTH CARE EDUCATION/TRAINING PROGRAM

## 2024-12-09 PROCEDURE — 80048 BASIC METABOLIC PNL TOTAL CA: CPT | Performed by: STUDENT IN AN ORGANIZED HEALTH CARE EDUCATION/TRAINING PROGRAM

## 2024-12-19 ENCOUNTER — ORDER TRANSCRIPTION (OUTPATIENT)
Dept: ADMINISTRATIVE | Facility: HOSPITAL | Age: 64
End: 2024-12-19

## 2024-12-19 DIAGNOSIS — D32.9 MENINGIOMA (HCC): ICD-10-CM

## 2024-12-19 DIAGNOSIS — R56.9 SEIZURE (HCC): Primary | ICD-10-CM

## 2024-12-19 DIAGNOSIS — R35.81 NOCTURNAL POLYURIA: ICD-10-CM

## 2025-01-08 ENCOUNTER — PATIENT MESSAGE (OUTPATIENT)
Facility: CLINIC | Age: 65
End: 2025-01-08

## 2025-01-08 DIAGNOSIS — R35.0 INCREASED FREQUENCY OF URINATION: Primary | ICD-10-CM

## 2025-01-08 DIAGNOSIS — E06.3 HYPOTHYROIDISM DUE TO HASHIMOTO'S THYROIDITIS: ICD-10-CM

## 2025-01-08 PROBLEM — K64.4 EXTERNAL PROLAPSED HEMORRHOIDS: Status: RESOLVED | Noted: 2023-05-03 | Resolved: 2025-01-08

## 2025-01-08 PROBLEM — M17.11 PRIMARY OSTEOARTHRITIS OF RIGHT KNEE: Status: RESOLVED | Noted: 2018-05-09 | Resolved: 2025-01-08

## 2025-01-08 PROBLEM — R00.0 TACHYCARDIA: Status: RESOLVED | Noted: 2022-03-09 | Resolved: 2025-01-08

## 2025-01-08 PROBLEM — K57.92 DIVERTICULITIS: Status: RESOLVED | Noted: 2023-06-19 | Resolved: 2025-01-08

## 2025-01-08 PROBLEM — I49.3 PVC (PREMATURE VENTRICULAR CONTRACTION): Status: RESOLVED | Noted: 2022-01-07 | Resolved: 2025-01-08

## 2025-01-09 ENCOUNTER — OFFICE VISIT (OUTPATIENT)
Dept: FAMILY MEDICINE CLINIC | Facility: CLINIC | Age: 65
End: 2025-01-09
Payer: MEDICARE

## 2025-01-09 VITALS
OXYGEN SATURATION: 97 % | RESPIRATION RATE: 16 BRPM | WEIGHT: 164 LBS | DIASTOLIC BLOOD PRESSURE: 76 MMHG | HEART RATE: 76 BPM | HEIGHT: 66 IN | BODY MASS INDEX: 26.36 KG/M2 | SYSTOLIC BLOOD PRESSURE: 122 MMHG

## 2025-01-09 DIAGNOSIS — Z00.00 ENCOUNTER FOR ANNUAL HEALTH EXAMINATION: Primary | ICD-10-CM

## 2025-01-09 DIAGNOSIS — M54.16 LUMBAR RADICULOPATHY, CHRONIC: ICD-10-CM

## 2025-01-09 DIAGNOSIS — F90.9 ATTENTION DEFICIT HYPERACTIVITY DISORDER (ADHD), UNSPECIFIED ADHD TYPE: ICD-10-CM

## 2025-01-09 DIAGNOSIS — R35.0 URINARY FREQUENCY: ICD-10-CM

## 2025-01-09 DIAGNOSIS — G43.909 MIGRAINE WITHOUT STATUS MIGRAINOSUS, NOT INTRACTABLE, UNSPECIFIED MIGRAINE TYPE: ICD-10-CM

## 2025-01-09 DIAGNOSIS — R56.9 SEIZURE (HCC): ICD-10-CM

## 2025-01-09 DIAGNOSIS — E06.3 HASHIMOTO'S THYROIDITIS: ICD-10-CM

## 2025-01-09 DIAGNOSIS — K64.2 PROLAPSED INTERNAL HEMORRHOIDS, GRADE 3: ICD-10-CM

## 2025-01-09 DIAGNOSIS — J45.20 MILD INTERMITTENT ASTHMA WITHOUT COMPLICATION (HCC): ICD-10-CM

## 2025-01-09 DIAGNOSIS — M48.00 SPINAL STENOSIS, UNSPECIFIED SPINAL REGION: ICD-10-CM

## 2025-01-09 DIAGNOSIS — R00.2 PALPITATIONS: ICD-10-CM

## 2025-01-09 DIAGNOSIS — N81.6 RECTOCELE: ICD-10-CM

## 2025-01-09 DIAGNOSIS — K57.90 DIVERTICULOSIS: ICD-10-CM

## 2025-01-09 DIAGNOSIS — Z87.19 HISTORY OF COLONIC DIVERTICULITIS: ICD-10-CM

## 2025-01-09 DIAGNOSIS — R73.9 HYPERGLYCEMIA: ICD-10-CM

## 2025-01-09 DIAGNOSIS — D32.0 MENINGIOMA, CEREBRAL (HCC): ICD-10-CM

## 2025-01-09 DIAGNOSIS — I49.3 PVC (PREMATURE VENTRICULAR CONTRACTION): ICD-10-CM

## 2025-01-09 DIAGNOSIS — J30.9 ALLERGIC RHINITIS, UNSPECIFIED SEASONALITY, UNSPECIFIED TRIGGER: ICD-10-CM

## 2025-01-09 RX ORDER — LEVOTHYROXINE SODIUM 75 MCG
75 TABLET ORAL
Qty: 90 TABLET | Refills: 0 | Status: SHIPPED | OUTPATIENT
Start: 2025-01-09

## 2025-01-09 RX ORDER — LACOSAMIDE 100 MG/1
TABLET ORAL
COMMUNITY
Start: 2025-01-01

## 2025-01-09 RX ORDER — DESLORATADINE 5 MG/1
5 TABLET ORAL 2 TIMES DAILY
COMMUNITY
Start: 2024-09-10

## 2025-01-09 RX ORDER — BUDESONIDE 0.5 MG/2ML
INHALANT ORAL
COMMUNITY
Start: 2010-02-10

## 2025-01-09 NOTE — TELEPHONE ENCOUNTER
Endocrine refill protocol for medications for hypothyroidism and hyperthyroidism    Protocol Criteria:  PASSED Reason: N/A    If all below requirements are met, send a 90-day supply with 1 refill per provider protocol.    Verify appointment with Endocrinology completed in the last 12 months or scheduled in the next 6 months.    Normal TSH result in the past 12 months   Review recent telephone encounters and mychart communications with patient to ensure a dose change has not occurred since last office visit that was not updated in the medication history list     Last completed office visit:11/26/2024 Courtney Oliver DO   Next scheduled Follow up: 2/27/2025  Future Appointments   Date Time Provider Department Center   1/9/2025 11:30 AM Maria Luisa Sunshine DO [facility] [address]   1/13/2025  9:00 AM EEGRM1 [facility]   1/14/2025  9:00 AM EEGRM1 [facility]   1/15/2025  9:00 AM [facility]GR[facility]1 [facility]   1/16/2025  9:00 AM [facility]GRM1 [facility]   2/20/2025  8:30 AM WDR LAB [facility]   2/27/2025 11:30 AM Courtney Oliver DO [id number] [facility] [place]      Last TSH result:   TSH   Date Value Ref Range Status   09/27/2024 0.722 0.550 - 4.780 mIU/mL Final   09/22/2014 1.280 0.450 - 4.500 uIU/mL Final   06/05/2010 4.810 (H) 0.450 - 4.500 uIU/mL Final   02/23/2008 3.906 0.350 - 5.500 uIU/mL Final

## 2025-01-09 NOTE — PROGRESS NOTES
Subjective:   Beulah Menard is a 64 year old female who presents for a Medicare Subsequent Annual Wellness visit (Pt already had Initial Annual Wellness) and scheduled follow up of multiple significant but stable problems.   Pt also here with    AR (allergic rhinitis)    ADHD (attention deficit hyperactivity disorder)    Migraine headache    Diverticulosis    Spinal stenosis, CERVICAL AND L/S.    PVC (premature ventricular contraction)    Prolapsed internal hemorrhoids, grade 3    Rectocele    History of colonic diverticulitis    Hashimoto's thyroiditis    Hypothyroidism due to Hashimoto's thyroiditis    Lumbar radiculopathy, chronic    Palpitations    Seizure (HCC)         History/Other:   Fall Risk Assessment:   She has been screened for Falls and is low risk.      Cognitive Assessment:   She had a completely normal cognitive assessment - see flowsheet entries     Functional Ability/Status:   Beulah Menard has a completely normal functional assessment. See flowsheet for details.        Depression Screening (PHQ):  PHQ-2 SCORE: 0  , done 1/2/2025        Advanced Directives: She does NOT have a Living Will. [Do you have a living will?: (Patient-Rptd) Yes]    She does NOT have a Power of  for Health Care. [Do you have a healthcare power of ?: (Patient-Rptd) Yes]    Discussed Advance Care Planning with patient (and family/surrogate if present). Standard forms made available to patient in After Visit Summary.      Patient Active Problem List   Diagnosis    AR (allergic rhinitis)- stable     ADHD (attention deficit hyperactivity disorder)- stable     Migraine headache- stable    Diverticulosis- stable    Spinal stenosis, CERVICAL AND L/S.- stable    PVC (premature ventricular contraction)- stable     Prolapsed internal hemorrhoids, grade 3- stable     Rectocele- stable     History of colonic diverticulitis- stable     Hashimoto's thyroiditis- stable    Hypothyroidism due to Hashimoto's  thyroiditis- stable     Lumbar radiculopathy, chronic- stable     Palpitations- stable     Seizure (HCC)- stable     Meningioma, cerebral (HCC)- s/p surgery     Meningioma (HCC)- s/p surgery     Nocturnal polyuria- stable      Allergies:  She is allergic to american elm; ciprofloxacin; mold; morphine; and tree, elm.    Current Medications:  Outpatient Medications Marked as Taking for the 1/9/25 encounter (Office Visit) with Maria Luisa Sunshine,    Medication Sig    SYNTHROID 75 MCG Oral Tab TAKE 1 TABLET (75 MCG TOTAL) BY MOUTH BEFORE BREAKFAST.    budesonide 0.5 MG/2ML Inhalation Suspension     desloratadine 5 MG Oral Tab Take 1 tablet (5 mg total) by mouth 2 (two) times daily.    lacosamide 100 MG Oral Tab     Levalbuterol Tartrate 45 MCG/ACT Inhalation Aerosol Inhale into the lungs every 4 (four) hours as needed for Wheezing.    liothyronine 5 MCG Oral Tab Take 1 tablet (5 mcg total) by mouth daily.    Coenzyme Q10 (CO Q 10) 100 MG Oral Cap Take 1 Capful by mouth daily.    RESVERATROL OR Take 1 tablet by mouth daily.    AZSTARYS 52.3-10.4 MG Oral Cap     AZSTARYS 26.1-5.2 MG Oral Cap     Lactobacillus (PROBIOTIC ACIDOPHILUS OR) Take by mouth.    estradiol 0.05 MG/24HR Transdermal Patch Biweekly Place 1 patch onto the skin twice a week.    progesterone 100 MG Oral Cap Take 1 capsule (100 mg total) by mouth nightly.    Levalbuterol HCl 0.63 MG/3ML Inhalation Nebu Soln as needed.      Magnesium Gluconate 250 MG Oral Tab Take 750 mg by mouth.    omega-3 fatty acids (FISH OIL) 1000 MG Oral Cap Take 1,000 mg by mouth 2 (two) times daily.    Desloratadine (CLARINEX OR) Take 5 mg by mouth 2 (two) times daily.      PULMICORT FLEXHALER 180 MCG/ACT Inhalation Aerosol Powder, Breath Activated Inhale 2 puffs into the lungs 2 (two) times daily.    aspirin 81 MG Oral Tab Take 1 tablet (81 mg total) by mouth daily.    Azelastine-Fluticasone 137-50 MCG/ACT Nasal Suspension by Nasal route.    VITAMIN D OR Take 5,000 Units by mouth  daily.    MSM OR Take 1,000 mg by mouth.      VITAMIN C 500 MG OR TABS 1 TABLET DAILY    SELENIUM 200 MCG OR TABS 2 qd       Medical History:  She  has a past medical history of ADHD (attention deficit hyperactivity disorder) (08/15/2005), Anesthesia complication, AR (allergic rhinitis) (08/15/2005), Arrhythmia, Asthma (HCC), BRCA1 negative, BRCA2 negative, Diverticulitis, Diverticulosis (08/15/2008), DJD (degenerative joint disease), Hashimoto's thyroiditis, Hemangioma, Herniated disc, cervical, History of blood transfusion, Lactose intolerance (08/15/2008), Meningioma (), Migraine headache (08/15/1973), Primary osteoarthritis of right knee (2018), PVC (premature ventricular contraction) (2021), Seizure disorder (), Spinal stenosis, and Transfusion ().  Surgical History:  She  has a past surgical history that includes cholecystectomy (); colonoscopy (); colonoscopy ();  (,); tonsillectomy; other surgical history (); other surgical history (); other surgical history (); appendectomy (); other surgical history (); hysteroscopy (2018); colonoscopy (N/A, 3/22/2019); and colonoscopy (N/A, 2024).   Family History:  Her family history includes Breast Cancer (age of onset: 35) in her cousin; Breast Cancer (age of onset: 64) in her paternal aunt; Cancer in her mother and another family member; HYPOTHYROID in her sister; Heart Disorder in her father, maternal grandfather, and paternal grandfather; Hypertension in her father, mother, sister, and sister; Other in her daughter and son; Ovarian Cancer in her paternal cousin female; Pancreatic Cancer in her maternal aunt; Psychiatric in her brother, son, and son; SJOGERN'S in her sister; SLE in her sister.  Social History:  She  reports that she has never smoked. She has never used smokeless tobacco. She reports current alcohol use. She reports that she does not use drugs.    Tobacco:  She has never  smoked tobacco.    CAGE Alcohol Screen:   CAGE screening score of 0 on 1/2/2025, showing low risk of alcohol abuse.    Patient Care Team:  Maria Luisa Sunshine DO as PCP - General (Family Practice)  Clayton Ramirez MD (GASTROENTEROLOGY)  Salazar Martinez MD (NEUROSURGERY)    Review of Systems  GENERAL: feels well otherwise  SKIN: denies any unusual skin lesions  EYES: denies blurred vision or double vision  HEENT: denies nasal congestion, sinus pain or ST  LUNGS: denies shortness of breath with exertion  CARDIOVASCULAR: denies chest pain on exertion  GI: denies abdominal pain, denies heartburn  : denies dysuria, vaginal discharge or itching, no complaint of urinary incontinence   MUSCULOSKELETAL: denies back pain  PSYCHE: denies depression or anxiety  HEMATOLOGIC: denies hx of anemia  ENDOCRINE:  thyroid history  ALL/ASTHMA: denies asthma    Objective:   Physical Exam  General Appearance:  Alert, cooperative, no distress, appears stated age   Head:  Normocephalic, without obvious abnormality, atraumatic   Eyes:  PERRL, conjunctiva/corneas clear, EOM's intact both eyes   Ears:  Normal TM's and external ear canals, both ears   Nose: Nares normal, septum midline,mucosa normal, no drainage or sinus tenderness   Throat: Lips, mucosa, and tongue normal; teeth and gums normal   Neck: Supple, symmetrical, trachea midline, no adenopathy;  thyroid: enlarged, symmetric, no tenderness/mass/nodules; no carotid bruit or JVD   Back:   Symmetric, no curvature, ROM normal, no CVA tenderness   Lungs:   Clear to auscultation bilaterally, respirations unlabored   Heart:  Regular rate and rhythm, S1 and S2 normal, no murmur, rub, or gallop   Abdomen:   Soft, non-tender, bowel sounds active all four quadrants,  no masses, no organomegaly   Pelvic: Deferred   Extremities: Extremities normal, atraumatic, no cyanosis or edema   Pulses: 2+ and symmetric   Skin: Skin color, texture, turgor normal, no rashes or lesions   Lymph nodes: Cervical,  supraclavicular, and axillary nodes normal   Neurologic: Normal       /76   Pulse 76   Resp 16   Ht 5' 6\" (1.676 m)   Wt 164 lb (74.4 kg)   LMP 10/13/2007   SpO2 97%   BMI 26.47 kg/m²  Estimated body mass index is 26.47 kg/m² as calculated from the following:    Height as of this encounter: 5' 6\" (1.676 m).    Weight as of this encounter: 164 lb (74.4 kg).    Medicare Hearing Assessment:   Hearing Screening    Screening Method: Whisper Test  Whisper Test Result: Pass         Visual Acuity:   Right Eye Visual Acuity: Uncorrected Right Eye Chart Acuity: 20/30   Left Eye Visual Acuity: Uncorrected Left Eye Chart Acuity: 20/400   Both Eyes Visual Acuity: Uncorrected Both Eyes Chart Acuity: 20/25   Able To Tolerate Visual Acuity: Yes        Assessment & Plan:   Beulah Menard is a 64 year old female who presents for a Medicare Assessment.     1. Encounter for annual health examination (Primary)  2. PVC (premature ventricular contraction)- stable monitor   -     Detailed, Mod Complex (25436)  3. Palpitations- stable monitor   -     Detailed, Mod Complex (32840)  4. Hashimoto's thyroiditis- stable monitor   -     Detailed, Mod Complex (20192)  5. Hyperglycemia- stable monitor   -     Detailed, Mod Complex (63278)  6. Migraine without status migrainosus, not intractable, unspecified migraine type- stable monitor   -     Detailed, Mod Complex (08942)  7. Spinal stenosis, unspecified spinal region- stable monitor   -     Detailed, Mod Complex (15622)  8. Lumbar radiculopathy, chronic- stable monitor   -     Detailed, Mod Complex (44769)  9. Attention deficit hyperactivity disorder (ADHD), unspecified ADHD type- stable cpm  Overview:  BEFORE 2005  [place]  -     Detailed, Mod Complex ([id number])  10. Diverticulosis- stable monitor - stable monitor   -     Detailed, Mod Complex ([id number])  11. Prolapsed internal hemorrhoids, grade 3- stable monitor - stable monitor   -     Detailed, Mod Complex (83771)  12. Rectocele-  stable monitor - stable mnitor   -     Detailed, Mod Complex (04523)  13. History of colonic diverticulitis- stable monitor   -     Detailed, Mod Complex (13966)  14. Allergic rhinitis, unspecified seasonality, unspecified trigger- stable monitor - stable cpm  Overview:  BEFORE 2005  Orders:  -     Detailed, Mod Complex ([id number])  15. Seizure (HCC)- stable monitor - stable monitor   -     Detailed, Mod Complex (21582)  16. Meningioma, cerebral (HCC)- doing well s/p surgery at [facility]   -     Detailed, Mod Complex (92590)  17. Urinary frequency- urine testing   -     Urine Culture, Routine; Future; Expected date: 01/09/2025  -     Urinalysis, Routine; Future; Expected date: 01/09/2025  -     Detailed, Mod Complex ([id number])  18. Mild intermittent asthma without complication (HCC)- stable cpm  Other orders  -     Event Monitor Test    The patient indicates understanding of these issues and agrees to the plan.  Reinforced healthy diet, lifestyle, and exercise.    Return in 3 months (on 4/9/2025).     Maria Luisa Sunshine DO, 1/9/2025     Supplementary Documentation:   General Health:  In the past six months, have you lost more than 10 pounds without trying?: (Patient-Rptd) 2 - No  Has your appetite been poor?: (Patient-Rptd) Yes  Type of Diet: (Patient-Rptd) Balanced  How does the patient maintain a good energy level?: (Patient-Rptd) Appropriate Exercise;Stretching;Other  How would you describe your daily physical activity?: (Patient-Rptd) Moderate  How would you describe your current health state?: (Patient-Rptd) Good  How do you maintain positive mental well-being?: (Patient-Rptd) Social Interaction;Puzzles;Games;Visiting Friends;Visiting Family  On a scale of 0 to 10, with 0 being no pain and 10 being severe pain, what is your pain level?: (Patient-Rptd) 0 - (None)  In the past six months, have you experienced urine leakage?: (Patient-Rptd) 1-Yes  At any time do you feel concerned for the safety/well-being of yourself and/or your  children, in your home or elsewhere?: (Patient-Rptd) No  Have you had any immunizations at another office such as Influenza, Hepatitis B, Tetanus, or Pneumococcal?: (Patient-Rptd) Yes    Health Maintenance   Topic Date Due    DEXA Scan  09/29/2023    Mammogram  03/25/2025    Pneumococcal Vaccine: 50+ Years (3 of 3 - PCV20 or PCV21) 10/16/2025    Annual Physical  09/20/2025    Pap Smear  06/09/2026    Colorectal Cancer Screening  08/06/2034    Influenza Vaccine  Completed    Annual Depression Screening  Completed    Fall Risk Screening (Annual)  Completed    Zoster Vaccines  Completed    COVID-19 Vaccine  Completed    Meningococcal B Vaccine  Aged Out

## 2025-01-13 ENCOUNTER — NURSE ONLY (OUTPATIENT)
Dept: ELECTROPHYSIOLOGY | Facility: HOSPITAL | Age: 65
End: 2025-01-13
Attending: Other
Payer: MEDICARE

## 2025-01-13 DIAGNOSIS — R35.81 NOCTURNAL POLYURIA: ICD-10-CM

## 2025-01-13 DIAGNOSIS — D32.9 MENINGIOMA (HCC): ICD-10-CM

## 2025-01-13 DIAGNOSIS — R56.9 SEIZURE (HCC): ICD-10-CM

## 2025-01-13 PROCEDURE — 95708 EEG WO VID EA 12-26HR UNMNTR: CPT

## 2025-01-13 PROCEDURE — 95700 EEG CONT REC W/VID EEG TECH: CPT

## 2025-01-14 ENCOUNTER — NURSE ONLY (OUTPATIENT)
Dept: ELECTROPHYSIOLOGY | Facility: HOSPITAL | Age: 65
End: 2025-01-14
Attending: Other
Payer: MEDICARE

## 2025-01-14 PROBLEM — R35.81 NOCTURNAL POLYURIA: Status: ACTIVE | Noted: 2025-01-14

## 2025-01-14 PROBLEM — D32.9 MENINGIOMA (HCC): Status: ACTIVE | Noted: 2025-01-14

## 2025-01-14 PROCEDURE — 95708 EEG WO VID EA 12-26HR UNMNTR: CPT

## 2025-01-15 ENCOUNTER — NURSE ONLY (OUTPATIENT)
Dept: ELECTROPHYSIOLOGY | Facility: HOSPITAL | Age: 65
End: 2025-01-15
Attending: Other
Payer: MEDICARE

## 2025-01-15 PROCEDURE — 95708 EEG WO VID EA 12-26HR UNMNTR: CPT

## 2025-02-03 NOTE — PROGRESS NOTES
Pt here for suture removal and HFU   Pt was admitted to [facility] for removal of meningioma ; pt was walking and talking right away   Pt feels she may be slight weaker on the right side and using walking sticks just to be careful   Pt was doing very well postoperatively   Pt was  placed on vanco and cefepime postoperatively and was also on steroids until 2 days ago - noted a diffuse rash   No tongue or lip swelling   Here for suture removal   Placed at [facility]   No redness, warmth or wound discharge.    /74   Pulse 54   Resp 16   Ht 5' 6\" (1.676 m)   Wt 166 lb (75.3 kg)   LMP 10/13/2007   SpO2 98%   BMI 26.79 kg/m²     Gen: Alert, pleasant, NAD  Scalp: all staples removed without difficulty    No redness, warmth or wound discharge.    SKIN: torso diffuse blanching macular red rash     Vicryl suture for drain still intact / will monitor     A/P  Pt here with   1. Rash  Suspect delayed drug rash that was masked by steroids   Abx placed on allergy list     2. Removal of staple  Pt tolerated well     3. Seizure (HCC)  Cpm and monitor     4. Meningioma, cerebral (HCC)  S/p surgery and doing very well     Discussed wound and sun care.  RTC in 1-2 mo or sooner if needed.

## 2025-02-06 ENCOUNTER — OFFICE VISIT (OUTPATIENT)
Dept: FAMILY MEDICINE CLINIC | Facility: CLINIC | Age: 65
End: 2025-02-06
Payer: MEDICARE

## 2025-02-06 VITALS
HEIGHT: 66 IN | SYSTOLIC BLOOD PRESSURE: 120 MMHG | DIASTOLIC BLOOD PRESSURE: 74 MMHG | HEART RATE: 54 BPM | BODY MASS INDEX: 26.68 KG/M2 | WEIGHT: 166 LBS | RESPIRATION RATE: 16 BRPM | OXYGEN SATURATION: 98 %

## 2025-02-06 DIAGNOSIS — R21 RASH: Primary | ICD-10-CM

## 2025-02-06 DIAGNOSIS — R56.9 SEIZURE (HCC): ICD-10-CM

## 2025-02-06 DIAGNOSIS — D32.0 MENINGIOMA, CEREBRAL (HCC): ICD-10-CM

## 2025-02-06 DIAGNOSIS — Z48.02 REMOVAL OF STAPLE: ICD-10-CM

## 2025-02-06 PROCEDURE — 99214 OFFICE O/P EST MOD 30 MIN: CPT | Performed by: FAMILY MEDICINE

## 2025-02-06 RX ORDER — LACOSAMIDE 150 MG/1
TABLET ORAL
COMMUNITY
Start: 2025-01-25

## 2025-02-24 ENCOUNTER — LAB ENCOUNTER (OUTPATIENT)
Dept: LAB | Age: 65
End: 2025-02-24
Attending: FAMILY MEDICINE
Payer: MEDICARE

## 2025-02-24 DIAGNOSIS — E06.3 HYPOTHYROIDISM DUE TO HASHIMOTO'S THYROIDITIS: ICD-10-CM

## 2025-02-24 LAB
T3 SERPL-MCNC: 1.13 NG/ML (ref 0.6–1.81)
T4 FREE SERPL-MCNC: 1 NG/DL (ref 0.8–1.7)
TSI SER-ACNC: 0.98 UIU/ML (ref 0.55–4.78)

## 2025-02-24 PROCEDURE — 84439 ASSAY OF FREE THYROXINE: CPT

## 2025-02-24 PROCEDURE — 84480 ASSAY TRIIODOTHYRONINE (T3): CPT

## 2025-02-24 PROCEDURE — 36415 COLL VENOUS BLD VENIPUNCTURE: CPT

## 2025-02-24 PROCEDURE — 84443 ASSAY THYROID STIM HORMONE: CPT

## 2025-02-27 ENCOUNTER — PATIENT MESSAGE (OUTPATIENT)
Facility: CLINIC | Age: 65
End: 2025-02-27

## 2025-02-27 DIAGNOSIS — E06.3 HYPOTHYROIDISM DUE TO HASHIMOTO'S THYROIDITIS: Primary | ICD-10-CM

## 2025-02-28 NOTE — TELEPHONE ENCOUNTER
Unfortunately it's not coming up as an add-on specimen. Dr. Oliver may not have ordered them as she had them done 6 months ago and they were normal; many endocrinologists do not make a practice of monitoring thyroid antibodies over time due to natural fluctuation, and many endocrinologists do not monitor more than yearly even if they do follow antibodies. I am unfortunately unable to add those antibodies on, if she's coming in Monday and still wants to have them perhaps Dr. GASCA will order them and she can have them done in the lab in the building given her mobility issues. Apologies for any inconvenience.

## 2025-03-03 ENCOUNTER — LAB ENCOUNTER (OUTPATIENT)
Dept: LAB | Age: 65
End: 2025-03-03
Attending: STUDENT IN AN ORGANIZED HEALTH CARE EDUCATION/TRAINING PROGRAM
Payer: MEDICARE

## 2025-03-03 ENCOUNTER — OFFICE VISIT (OUTPATIENT)
Facility: CLINIC | Age: 65
End: 2025-03-03
Payer: MEDICARE

## 2025-03-03 VITALS
SYSTOLIC BLOOD PRESSURE: 124 MMHG | OXYGEN SATURATION: 97 % | HEART RATE: 111 BPM | DIASTOLIC BLOOD PRESSURE: 76 MMHG | HEIGHT: 66 IN | WEIGHT: 168 LBS | BODY MASS INDEX: 27 KG/M2

## 2025-03-03 DIAGNOSIS — E06.3 HYPOTHYROIDISM DUE TO HASHIMOTO'S THYROIDITIS: ICD-10-CM

## 2025-03-03 DIAGNOSIS — E06.3 HYPOTHYROIDISM DUE TO HASHIMOTO'S THYROIDITIS: Primary | ICD-10-CM

## 2025-03-03 DIAGNOSIS — Z78.0 POSTMENOPAUSAL STATE: ICD-10-CM

## 2025-03-03 DIAGNOSIS — M85.859 OSTEOPENIA OF NECK OF FEMUR, UNSPECIFIED LATERALITY: ICD-10-CM

## 2025-03-03 LAB
THYROGLOB SERPL-MCNC: <15 U/ML (ref ?–60)
THYROPEROXIDASE AB SERPL-ACNC: 49 U/ML (ref ?–60)

## 2025-03-03 PROCEDURE — 99214 OFFICE O/P EST MOD 30 MIN: CPT | Performed by: STUDENT IN AN ORGANIZED HEALTH CARE EDUCATION/TRAINING PROGRAM

## 2025-03-03 PROCEDURE — 36415 COLL VENOUS BLD VENIPUNCTURE: CPT

## 2025-03-03 PROCEDURE — 86376 MICROSOMAL ANTIBODY EACH: CPT

## 2025-03-03 PROCEDURE — 86800 THYROGLOBULIN ANTIBODY: CPT

## 2025-03-03 RX ORDER — LIOTHYRONINE SODIUM 5 UG/1
5 TABLET ORAL DAILY
Qty: 90 TABLET | Refills: 1 | Status: SHIPPED | OUTPATIENT
Start: 2025-03-03

## 2025-03-03 RX ORDER — LIOTHYRONINE SODIUM 5 UG/1
5 TABLET ORAL DAILY
COMMUNITY
Start: 2025-01-18 | End: 2025-03-03

## 2025-03-03 RX ORDER — LEVOTHYROXINE SODIUM 75 UG/1
75 TABLET ORAL
Qty: 90 TABLET | Refills: 1 | Status: SHIPPED | OUTPATIENT
Start: 2025-03-03

## 2025-03-03 NOTE — PROGRESS NOTES
ENDOCRINOLOGY, DIABETES, & METABOLISM NOTE     Subjective:   Beulah Menard is a 64 year old female who presents regarding hypothyroidism and osteopenia.     Initial HPI 2/2024  Patient is a former patient of Dr. Tavarez and saw Dr. Patel (Ruth) on 9/2023.   She presents today to discuss her hypothyroidism 2/2 hashimotos and osteopenia.     Hashimotos Hypothyroidism:  Diagnosed with hypothyroidism in 2008 and then started on estrogen/progesterone since 2015 and notes TFTs have remained stable  COVID 1/2023; had post-covid symptoms and asthma/steroids for 2 months (ended around 3/2023)  Was on LT4 75mcg daily and added cytomel 5 mcg 6 days a week around 6/2/2023 with Dr. Tavarez  Around 12/2023 noted hair loss, constipation, mid day fatigue, word finding difficulty, dry skin, palpitations, muscle pain in her legs. Had labs done and FT3 low, recommended to increase Cytomel to 5 mcg 7 days a week  Notes improvement in her symptoms   She is currently on LT4 75 mcg + Cytomel 5 mcg daily without any symptoms     Osteopenia:  -2015 had FOOSH with fracture and surgery   -Takes vitamin d 10,000 and MSM (magnesium, calcium, glucosamine); unsure of dose   -Adequate dietary calcium  -Denies family hx of osteoporosis   -Denies hx of kidney stones, previous hx of calcium problems  -Denies excessive ETOH use  -Drinks tea daily   -Menopause was at age 48; currently on estrogen patch as above    8/2024 8/2024 TSH 0.927, FT4 1.3, total T3 1.07  Currently taking Thryoid Meds: liothyronine Tabs - 5 MCG + Synthroid 75 mcg daily   Has been feeling well from a thyroid standpoint   Underwent MRI 5/2024 and was found to have a 2.5 cm tumor and saw Wisner for meningioma, planning on surgery 1/2025  Has a hx of focal motor seizure 8/3, prescribed Vimpat but has not started yet.     11/26/2024  Labs: 9/2024 TSH 0.722, FT4 1.3, TT3 1.23, A1c 5.8, Na 139  Started on Vimpat since LOV  Notes increased urination overnight since 2023  Drinks  24 ounces-48 ounces and notes increased urination overnight; notes episodes are recently more frequent around once a month    03/03/25  Labs: 2/2025 TSH 0.9, FT4 1.0, TT3 1.13. 1/2025 Na 140    Since LOV, had craniotomy and had increased urination thought to be secondary to autonomic seizure, increased vimpat to 150 mg BID  She continues on Synthroid 75 mcg daily + Cytomel 5 mcg daily  Continues on calcium and vitamin D supplements         History/Other:    Chief Complaint Reviewed and Verified  Nursing Notes Reviewed and   Verified  Tobacco Reviewed  Allergies Reviewed  Medications Reviewed    Medical History Reviewed  Surgical History Reviewed  Family History   Reviewed  Social History Reviewed         Tobacco:  She has never smoked tobacco.    Current Outpatient Medications   Medication Sig Dispense Refill    liothyronine 5 MCG Oral Tab Take 1 tablet (5 mcg total) by mouth daily. 90 tablet 1    levothyroxine (SYNTHROID) 75 MCG Oral Tab Take 1 tablet (75 mcg total) by mouth before breakfast. 90 tablet 1    lacosamide 150 MG Oral Tab       budesonide 0.5 MG/2ML Inhalation Suspension       desloratadine 5 MG Oral Tab Take 1 tablet (5 mg total) by mouth 2 (two) times daily.      lacosamide 100 MG Oral Tab       Levalbuterol Tartrate 45 MCG/ACT Inhalation Aerosol Inhale into the lungs every 4 (four) hours as needed for Wheezing.      Coenzyme Q10 (CO Q 10) 100 MG Oral Cap Take 1 Capful by mouth daily.      RESVERATROL OR Take 1 tablet by mouth daily.      AZSTARYS 52.3-10.4 MG Oral Cap       AZSTARYS 26.1-5.2 MG Oral Cap       Lactobacillus (PROBIOTIC ACIDOPHILUS OR) Take by mouth.      estradiol 0.05 MG/24HR Transdermal Patch Biweekly Place 1 patch onto the skin twice a week. 24 patch 4    progesterone 100 MG Oral Cap Take 1 capsule (100 mg total) by mouth nightly. 90 capsule 4    Levalbuterol HCl 0.63 MG/3ML Inhalation Nebu Soln as needed.        Magnesium Gluconate 250 MG Oral Tab Take 750 mg by mouth.       omega-3 fatty acids (FISH OIL) 1000 MG Oral Cap Take 1,000 mg by mouth 2 (two) times daily.      Desloratadine (CLARINEX OR) Take 5 mg by mouth 2 (two) times daily.        PULMICORT FLEXHALER 180 MCG/ACT Inhalation Aerosol Powder, Breath Activated Inhale 2 puffs into the lungs 2 (two) times daily.      aspirin 81 MG Oral Tab Take 1 tablet (81 mg total) by mouth daily.      Azelastine-Fluticasone 137-50 MCG/ACT Nasal Suspension by Nasal route.      VITAMIN D OR Take 5,000 Units by mouth daily.      MSM OR Take 1,000 mg by mouth.        VITAMIN C 500 MG OR TABS 1 TABLET DAILY      SELENIUM 200 MCG OR TABS 2 qd       Allergies   Allergen Reactions    [place] OTHER (SEE COMMENTS)     All trees    Ciprofloxacin OTHER (SEE COMMENTS)     Cognitive changes with oral use    Cyclobenzaprine OTHER (SEE COMMENTS)     Extremely lethargic.    Mold     Morphine NAUSEA AND VOMITING    Tree, Elm UNKNOWN     All trees    Cefepime RASH    Vancomycin RASH     Past Medical History:    ADHD (attention deficit hyperactivity disorder)    Anesthesia complication    prolonged awakening from Morphine    AR (allergic rhinitis)    Arrhythmia    PVC's    Asthma (HCC)    BRCA1 negative    BRCA2 negative    Diverticulitis    10/2020, 2021    Diverticulosis    DJD (degenerative joint disease)    Hashimoto's thyroiditis    Hemangioma    brain    Herniated disc, cervical    History of blood transfusion        Lactose intolerance    Meningioma (HCC)    Migraine headache    Primary osteoarthritis of right knee    PVC (premature ventricular contraction)    Seizure disorder (HCC)    Spinal stenosis    CERVICAL MRI    Transfusion    tubal pregnancy      Past Surgical History:   Procedure Laterality Date    Appendectomy        ,    TWO, EACH TIME TWINS.    Cholecystectomy      Colonoscopy  2006    diverticulosis, and internal hemorrhoids    Colonoscopy      Colonoscopy N/A 3/22/2019    Procedure: COLONOSCOPY,  POSSIBLE BIOPSY, POSSIBLE POLYPECTOMY 49303;  Surgeon: Clayton Ramirez MD;  [facility] [facility] [place] [facility]    Colonoscopy N/A 8/6/2024    Procedure: COLONOSCOPY;  Surgeon: Clayton Ramirez MD;  Location:  ENDOSCOPY    Hysteroscopy  07/20/2018    S&N, R/S of Endometrial Polyp    Other surgical history  2010    BMT    Other surgical history  1993    ECTOPIC  PREGNANCY REMOVAL    Other surgical history  2010    ENDOSCOPIC  SINUS    Other surgical history  2013    L  WRIST  PISIFORM-X  D/T  FX    Tonsillectomy       Social History     Socioeconomic History    Marital status:      Spouse name: JUAN    Number of children: 4    Years of education: PSY. D.   Occupational History    Occupation: NEUROPSYCHOLOGIST     Comment: SELF   Tobacco Use    Smoking status: Never    Smokeless tobacco: Never   Vaping Use    Vaping status: Never Used   Substance and Sexual Activity    Alcohol use: Yes     Alcohol/week: 0.0 standard drinks of alcohol     Comment: Occ glass of wine 2x/mo    Drug use: No     Comment: MARIJUANA 30 YRS. AGO W/O ABUSE/XS.    Sexual activity: Yes     Partners: Male   Other Topics Concern     Service No    Blood Transfusions Yes     Comment: 1993    Caffeine Concern No     Comment: 0-1 SERVING/D    Weight Concern No    Special Diet No    Exercise Yes     Comment: QD, AEROBIXC    Bike Helmet Yes    Seat Belt Yes   Social History Narrative    LIVES WITH SPOUSE AND TWO CHILDREN IN SINGLE FAMILY HOME.     Social Drivers of Health     Food Insecurity: No Food Insecurity (1/23/2025)    Received from [facility]    Hunger Vital Sign     Worried About Running Out of Food in the Last Year: Never true     Ran Out of Food in the Last Year: Never true   Transportation Needs: No Transportation Needs (1/23/2025)    Received from [facility]    PRAPARE - Transportation     Lack of Transportation (Medical): No     Lack of Transportation (Non-Medical): No   Housing Stability: Low Risk  (1/23/2025)     Received from [facility]    Housing Stability     What is your living situation today?: I have a steady place to live     Family History   Problem Relation Age of Onset    Cancer Mother         NHL and pancreatic cancer    Hypertension Mother     Cancer Other         colon cancer in maternal aunt and maternal 1st cousin    Heart Disorder Father         1)PACER   2)AF  3)CHF    Hypertension Father     Other (Other) Daughter         1)CHURG-STRAUSSE  2)ASTHMA    Other (Other) Son         ASTHMA    Heart Disorder Maternal Grandfather         MI AT 66    Heart Disorder Paternal Grandfather         CHF    Hypertension Sister     Other (SLE) Sister     Other (HYPOTHYROID) Sister     Psychiatric Son         ASPERGER'S    Psychiatric Son         ADD    Hypertension Sister     Other (SJOGERN'S) Sister     Psychiatric Brother         ADHD     Breast Cancer Cousin 35        P 1st cousin- dx age 35    Breast Cancer Paternal Aunt 64        dx age- 64    Ovarian Cancer Paternal Cousin Female     Other (Pancreatic Cancer) Maternal Aunt         stage 4 metastatic         Review of Systems:  10 point ROS completed, refer to HPI for pertinent positives    Objective:   /76   Pulse 111   Ht 5' 6\" (1.676 m)   Wt 168 lb (76.2 kg)   LMP 10/13/2007   SpO2 97%   BMI 27.12 kg/m²  Estimated body mass index is 27.12 kg/m² as calculated from the following:    Height as of this encounter: 5' 6\" (1.676 m).    Weight as of this encounter: 168 lb (76.2 kg).  CONSTITUTIONAL:  awake, alert, cooperative, no apparent distress, and appears stated age    PSYCH: normal affect  LUNGS: breathing comfortably  CARDIOVASCULAR:  regular rate   NECK:  no palpable thyroid nodules        Laboratory Data     Recent Labs: Labs reviewed in [facility] under lab tab on 3/3/2025. Interpretation: TFTs stable 10/2023 with low normal TSH, 12/2023 TSH WNL with low ft3, repeat levels 2/2024-present WNL    Thyroid:    Lab Results   Component Value Date    TSH 0.981  02/24/2025    TSH 0.722 09/27/2024    TSH 0.927 08/12/2024    T4F 1.0 02/24/2025    T4F 1.3 09/27/2024    T4F 1.3 08/12/2024     Bones:  Lab Results   Component Value Date    VITD 58.8 08/12/2024    VITD 41.9 12/18/2023    VITD 48.6 09/25/2023        Imaging     Radiology: Personally reviewed pertinent imaging   I reviewed the patient's records from outside facility: osteopenia of femoral neck 7/2023 11/18/2024 MRI Brain  -2.5x2.1x1.4 cm meningioma overlying superior posterior lateral margin of right frontal lobe    5/15/2024 MRI Brain  -2.5 cm meningioma along high right frontal convexity     7/5/2023 DXA  Bone Density:   -----------------------------------------------------------------   Region                  BMD    T-score  Z-score   Classification   -----------------------------------------------------------------   AP Spine (L1, L2, L3)         1.010   -0.1      1.6     Normal   Femoral Neck (Left)      0.706   -1.3      0.2     Osteopenia   Total Hip (Left)         0.902   -0.3      0.9     Normal   -----------------------------------------------------------------     4/2021 THYROID US  FINDINGS:  Right thyroid lobe:  Measures 4.6 x 1.2 x 1.4 cm (length x transverse x transverse).  Previously described a subcentimeter hyperechoic nodule in the posterior  interpolar region is today only vaguely suggested, poorly delineated from the  surrounding markedly heterogeneous parenchyma, but without significant  alteration in size or appearance when accounting for different equipment.  The thyroid isthmus measures 0.3 cm in thickness.     Left thyroid lobe:  Measures 4.4 x 1.0 x 1.3 cm (length x transverse x transverse).  Interpolar hypoechoic 0.7 cm nodule is also less conspicuous on today's exam.  Diffusely heterogeneous appearance of the parenchyma is unchanged. There are no  new dominant lesions.  There is no regional cervical lymphadenopathy by size criteria, including  incidentally noted level 2B left lymph  node and small lymph node inferior to the  left lower thyroid pole both measuring less than a centimeter in short axis.    IMPRESSION:  Overall, there is no significant alteration when compared to prior studies  dating back to 10/14/2015.    ASSESSMENT/PLAN   Assessment & Plan:     Beulah Menard is a 64 year old female who presented to clinic for:     Hypothyroidism due to Hashimoto's thyroiditis   Lab Results   Component Value Date    TSH 0.981 02/24/2025    T4F 1.0 02/24/2025   Pathophysiology of condition, goals of therapy,  d/w pt in detail.   Clinical significance of thyroid testing discussed, including goal of biochemical euthyroidism as determined by TSH level  Patient is currently clinically euthyroid    Repeat TFTs prior to follow up  Patient to inform me if stopping estrogen or vimpat   Continue Synthroid 75 mcg daily + Cytomel 5 mcg daily     Thyroid nodules  Discussed that patient has had stable nodules on US with thyroid US follow up from [id number]  Discussed repeating US since it has been 2 years vs. Obtaining imaging if symptoms arise   3/2024 Thyroid US: 9x7x8 mm RMP TR3 nodule and 7x4x5 mm LLP TR3 nodule, stable in size   Denies compressive symptoms at this time   Will follow up in 1 year (around 2/2026) to discuss if symptomatic     Osteopenia  FRAX below treatment threshold on DXA 7/2023  Patient to continue current vitamin D supplementation with goal vitamin D above 30  Patient to continue 1200 mg of calcium intake (supplement + diet) daily  Discussed fall precautions and weight-bearing exercise   Due for repeat DXA 7/2025    Return in about 6 months (around 9/3/2025).      The above plan was discussed in detail with the patient who verbalized understanding and agreement.      Courtney Oliver DO  [facility]  3/3/2025     In reviewing this note, please be advised that Dragon Voice Recognition software used to dictate the note may have made errors in recognizing some of the words or  phrases.     Note to patient: The 21 Century Cures Act makes medical notes like these available to patients in the interest of transparency. However, be advised this is a medical document. It is intended as peer to peer communication. It is written in medical language and may contain abbreviations or verbiage that are unfamiliar. It may appear blunt or direct. Medical documents are intended to carry relevant information, facts as evident, and the clinical opinion of the practitioner.

## 2025-03-03 NOTE — PATIENT INSTRUCTIONS
Visit Summary  Complete labs today  Repeat bone density 7/2025 onwards  Repeat thyroid labs + antibodies 8/2025 onwards  Continue Synthroid 75 mcg daily + cytomel 5 mcg daily  Continue vitamin D and calcium     General follow up information:  Please let us know if you require any refills at least 1 week prior to your medication running out. If you do run out of medication, please call our office ASAP to request refills (do not wait until your follow up).  Please call us if you experience any problems with insurance coverage of medication, lab work, or imaging.   Lab results and imaging will typically be reviewed at follow up appointments, or within 3-5 business days of ALL results being in if you do not have an appointment scheduled in the near future. Our office will contact you for any abnormal results requiring more urgent follow up or action.  The on-call pager is for urgent matters only. If you are a type 1 diabetic and run out of insulin after business hours 8AM-4PM, you may call the on-call pager for a refill to a 24 hour pharmacy. If you have adrenal insufficiency and run out of steroids, you may call the on-call pager for a refill to a 24 hours pharmacy. All other refill requests should be requested during business hours.    Return Visit   [x]  Dr. Oliver in 6 months   [] Virtual visit  [] No follow up appointment needed  [] Follow up to be scheduled pending      []  Fasting/8AM labs  []  Central scheduling # for ultrasound/nuclear med/CT/MRI/DXA/IR  []  Provide flyer for:  [] ENT   [] Weight Management  [] Infertility/Reproductive Endocrinology  [] Transgender care  []  Directions to 1st floor lab  [] Collect urine collection jug  [] Collect salivary cortisol tubes  []  Dental clearance form  []  Will need authorization for outside records

## 2025-03-05 ENCOUNTER — PATIENT MESSAGE (OUTPATIENT)
Facility: CLINIC | Age: 65
End: 2025-03-05

## 2025-03-05 DIAGNOSIS — E06.3 HYPOTHYROIDISM DUE TO HASHIMOTO'S THYROIDITIS: Primary | ICD-10-CM

## 2025-03-06 NOTE — TELEPHONE ENCOUNTER
She can complete her US if she is noting symptoms around her thyroid region. Even if I complete an exam, I still may have her complete an ultrasound if she continues to note symptoms. If symptoms are worsening she should present to an urgent care. I will sign the orders. Please let me know if she has any questions, thanks!

## 2025-03-14 ENCOUNTER — PATIENT MESSAGE (OUTPATIENT)
Facility: CLINIC | Age: 65
End: 2025-03-14

## 2025-03-14 DIAGNOSIS — E06.3 HYPOTHYROIDISM DUE TO HASHIMOTO'S THYROIDITIS: ICD-10-CM

## 2025-03-17 RX ORDER — LEVOTHYROXINE SODIUM 75 MCG
75 TABLET ORAL
Qty: 90 TABLET | Refills: 1 | Status: SHIPPED | OUTPATIENT
Start: 2025-03-17

## 2025-03-18 ENCOUNTER — PATIENT MESSAGE (OUTPATIENT)
Facility: CLINIC | Age: 65
End: 2025-03-18

## 2025-03-18 ENCOUNTER — HOSPITAL ENCOUNTER (OUTPATIENT)
Dept: ULTRASOUND IMAGING | Age: 65
Discharge: HOME | End: 2025-03-18
Attending: STUDENT IN AN ORGANIZED HEALTH CARE EDUCATION/TRAINING PROGRAM
Payer: MEDICARE

## 2025-03-18 DIAGNOSIS — E06.3 HYPOTHYROIDISM DUE TO HASHIMOTO'S THYROIDITIS: ICD-10-CM

## 2025-03-18 PROCEDURE — 76536 US EXAM OF HEAD AND NECK: CPT | Performed by: STUDENT IN AN ORGANIZED HEALTH CARE EDUCATION/TRAINING PROGRAM

## 2025-03-19 NOTE — TELEPHONE ENCOUNTER
I have reviewed her ultrasound and her nodule remains stable in size. No further work up at this time. I am glad she is feeling better.

## 2025-03-25 ENCOUNTER — LAB ENCOUNTER (OUTPATIENT)
Dept: LAB | Age: 65
End: 2025-03-25
Attending: FAMILY MEDICINE
Payer: MEDICARE

## 2025-03-25 ENCOUNTER — OFFICE VISIT (OUTPATIENT)
Dept: FAMILY MEDICINE CLINIC | Facility: CLINIC | Age: 65
End: 2025-03-25
Payer: MEDICARE

## 2025-03-25 VITALS
DIASTOLIC BLOOD PRESSURE: 88 MMHG | HEART RATE: 95 BPM | HEIGHT: 66 IN | RESPIRATION RATE: 16 BRPM | OXYGEN SATURATION: 98 % | SYSTOLIC BLOOD PRESSURE: 134 MMHG | BODY MASS INDEX: 27 KG/M2

## 2025-03-25 DIAGNOSIS — R42 DIZZINESS: ICD-10-CM

## 2025-03-25 DIAGNOSIS — E06.3 HASHIMOTO'S THYROIDITIS: ICD-10-CM

## 2025-03-25 DIAGNOSIS — Z12.31 ENCOUNTER FOR SCREENING MAMMOGRAM FOR HIGH-RISK PATIENT: ICD-10-CM

## 2025-03-25 DIAGNOSIS — E04.9 GOITER: Primary | ICD-10-CM

## 2025-03-25 DIAGNOSIS — D32.0 MENINGIOMA, CEREBRAL (HCC): ICD-10-CM

## 2025-03-25 DIAGNOSIS — Z11.59 SCREENING FOR VIRAL DISEASE: ICD-10-CM

## 2025-03-25 DIAGNOSIS — R41.3 MEMORY CHANGES: ICD-10-CM

## 2025-03-25 LAB
RUBV IGG SER QL: POSITIVE
RUBV IGG SER-ACNC: 60 IU/ML (ref 10–?)

## 2025-03-25 PROCEDURE — 86735 MUMPS ANTIBODY: CPT

## 2025-03-25 PROCEDURE — 99214 OFFICE O/P EST MOD 30 MIN: CPT | Performed by: FAMILY MEDICINE

## 2025-03-25 PROCEDURE — 86762 RUBELLA ANTIBODY: CPT

## 2025-03-25 PROCEDURE — 36415 COLL VENOUS BLD VENIPUNCTURE: CPT

## 2025-03-25 PROCEDURE — 86765 RUBEOLA ANTIBODY: CPT

## 2025-03-25 NOTE — PROGRESS NOTES
.     The following individual(s) verbally consented to be recorded using ambient AI listening technology and understand that they can each withdraw their consent to this listening technology at any point by asking the clinician to turn off or pause the recording:    Patient name: Beulah Menard  Additional names:  n/a      History of Present Illness  Radha Menard is a 64 year old female who presents for follow-up after brain surgery.    She is experiencing cognitive difficulties following her recent brain surgery, including memory issues, difficulty with word retrieval and generation, decreased processing speed, and struggles with tasks requiring procedural memory. She is undergoing speech therapy to address these issues. She is currently on a stimulant medication, which has helped with her cognitive function to some extent.    She describes significant vestibular issues, including a lack of balance and difficulty orienting herself in dark environments. She requires a gait belt for support during physical therapy and uses walking sticks for stability. She has been attending occupational and physical therapy to address these balance issues.    She has been experiencing emotional lability and frustration due to her cognitive and physical limitations. She has been off steroids for a few weeks, and since then, she has noticed an exacerbation of her symptoms, including cognitive and vestibular issues. She is currently on Vimpat, which may be contributing to some of her cognitive symptoms.    She reports a history of a dysplastic mole removal, which was benign, but she requires further excision to ensure complete removal.    She has a history of thyroid issues and is currently on Synthroid, with a recent ultrasound showing no significant changes.    She has been experiencing improved hydration status, as she no longer wakes up to urinate multiple times at night.  .    /88   Pulse 95   Resp 16   Ht 5' 6\"  (1.676 m)   LMP 10/13/2007   SpO2 98%   BMI 27.12 kg/m²     Gen: Alert, pleasant, NAD  Scalp: incision healing well CN 2-12 grossly intact     SKIN: torso diffuse blanching macular red rash     Vicryl suture for drain still intact / will monitor     A/P  Pt here with     Assessment & Plan  Cerebral Meningioma Post-Operative Recovery  Experiencing mild neglect and vestibular issues post-surgery for right parietal meningioma, with symptoms including difficulty with left-sided tasks, balance issues, and word retrieval challenges. Symptoms worsened after tapering off steroids, indicating residual inflammation. Undergoing occupational, physical, and speech therapy with expected gradual improvement. Emotional lability and frustration due to deficits. Driving is limited to short distances with precautions. Considering resuming steroids to manage inflammation and improve symptoms.  - Email [facility] regarding the possibility of resuming steroids to manage inflammation.  - Continue occupational, physical, and speech therapy twice a week.  - Monitor symptoms and consider adjusting Vimpat dosage after consultation with neurologist in May.  - Ensure adequate hydration, especially while on stimulants.    Thyroid Nodule  Thyroid nodule with intermittent lymphadenopathy, potentially related to seizure medication. Recent ultrasound showed no significant changes. Dissatisfied with current endocrinologist's management.  - Refer to a new endocrinologist for further evaluation and management of thyroid nodule.    Rash  Rash potentially related to medication. The specific medication causing the rash is unclear. Scheduled to see an allergist for further evaluation.  - Consult with allergist to determine the cause of the rash and potential medication allergies.    Dysplastic Nevus  Dysplastic nevus removed from the back. The lesion was benign but required further excision to ensure complete removal. Similar lesions previously  excised.  - Follow up with dermatologist for further excision of the dysplastic nevus.    General Health Maintenance  Requires routine health maintenance including immunization status and cancer screening. Uncertain about MMR booster need and requires mammogram and ultrasound due to previous issues.  - Order measles titer to assess immunity status.  - Order mammogram and ultrasound for breast cancer screening.    1. Goiter    - Endocrine Referral - In Network    2. Memory changes  Monitor     3. Dizziness  Monitor     4. Meningioma, cerebral (HCC)  Monitor      5. Hashimoto's thyroiditis    - Endocrine Referral - In Network    6. Encounter for screening mammogram for high-risk patient    - [facility] MEL 2D+3D SCREENING BILAT (CPT=77067/23448); Future    7. Screening for viral disease    - MMR Panel([facility] Panel); Future        Discussed wound and sun care.  RTC in 2-3 mo or sooner if needed.

## 2025-03-26 LAB
MEV IGG SER-ACNC: 149 AU/ML (ref 16.5–?)
MUV IGG SER IA-ACNC: 25.9 AU/ML (ref 11–?)

## 2025-04-02 ENCOUNTER — HOSPITAL ENCOUNTER (OUTPATIENT)
Dept: MAMMOGRAPHY | Age: 65
Discharge: HOME | End: 2025-04-02
Attending: FAMILY MEDICINE
Payer: MEDICARE

## 2025-04-02 DIAGNOSIS — Z12.31 ENCOUNTER FOR SCREENING MAMMOGRAM FOR HIGH-RISK PATIENT: ICD-10-CM

## 2025-04-02 PROCEDURE — 77067 SCR MAMMO BI INCL CAD: CPT | Performed by: FAMILY MEDICINE

## 2025-04-02 PROCEDURE — 77063 BREAST TOMOSYNTHESIS BI: CPT | Performed by: FAMILY MEDICINE

## 2025-04-21 ENCOUNTER — TELEPHONE (OUTPATIENT)
Dept: FAMILY MEDICINE CLINIC | Facility: CLINIC | Age: 65
End: 2025-04-21

## 2025-04-21 ENCOUNTER — LAB ENCOUNTER (OUTPATIENT)
Dept: LAB | Age: 65
End: 2025-04-21
Attending: FAMILY MEDICINE
Payer: MEDICARE

## 2025-04-21 DIAGNOSIS — R35.0 URINARY FREQUENCY: ICD-10-CM

## 2025-04-21 LAB
BILIRUB UR QL STRIP.AUTO: NEGATIVE
CLARITY UR REFRACT.AUTO: CLEAR
GLUCOSE UR STRIP.AUTO-MCNC: NORMAL MG/DL
KETONES UR STRIP.AUTO-MCNC: NEGATIVE MG/DL
LEUKOCYTE ESTERASE UR QL STRIP.AUTO: NEGATIVE
NITRITE UR QL STRIP.AUTO: NEGATIVE
PH UR STRIP.AUTO: 7 [PH] (ref 5–8)
PROT UR STRIP.AUTO-MCNC: NEGATIVE MG/DL
RBC UR QL AUTO: NEGATIVE
SP GR UR STRIP.AUTO: 1.01 (ref 1–1.03)
UROBILINOGEN UR STRIP.AUTO-MCNC: NORMAL MG/DL

## 2025-04-21 PROCEDURE — 87086 URINE CULTURE/COLONY COUNT: CPT

## 2025-04-21 PROCEDURE — 81003 URINALYSIS AUTO W/O SCOPE: CPT

## 2025-04-21 NOTE — TELEPHONE ENCOUNTER
Low grade fever, extreme fatigue, lower bowel and bladder pain in the front and the back, frequent urination.      Fever low grade:      Pt does not want to go to , refusing CT due to radiation exposure.  States she had urine containers at home given to her by Dr. Sunshine.  Returned to lab. Awaiting results

## 2025-04-21 NOTE — TELEPHONE ENCOUNTER
Patient called states she Thinks its either uti or diverticulitis.     Symptoms: Low grade fever, extreme fatigue, lower bowel and bladder pain in the front and the back, frequent urination.     Fever low grade:

## 2025-04-22 ENCOUNTER — PATIENT MESSAGE (OUTPATIENT)
Dept: FAMILY MEDICINE CLINIC | Facility: CLINIC | Age: 65
End: 2025-04-22

## 2025-04-24 ENCOUNTER — OFFICE VISIT (OUTPATIENT)
Dept: FAMILY MEDICINE CLINIC | Facility: CLINIC | Age: 65
End: 2025-04-24
Payer: MEDICARE

## 2025-04-24 DIAGNOSIS — K57.92 DIVERTICULITIS: Primary | ICD-10-CM

## 2025-04-24 DIAGNOSIS — J34.89 SINUS PAIN: ICD-10-CM

## 2025-04-24 PROCEDURE — 99214 OFFICE O/P EST MOD 30 MIN: CPT | Performed by: FAMILY MEDICINE

## 2025-04-24 RX ORDER — TRIAMCINOLONE ACETONIDE 1 MG/G
1 OINTMENT TOPICAL 2 TIMES DAILY PRN
COMMUNITY
Start: 2025-04-07

## 2025-04-24 RX ORDER — METRONIDAZOLE 500 MG/1
500 TABLET ORAL 3 TIMES DAILY
Qty: 30 TABLET | Refills: 0 | Status: SHIPPED | OUTPATIENT
Start: 2025-04-24 | End: 2025-05-01

## 2025-04-24 NOTE — PROGRESS NOTES
The following individual(s) verbally consented to be recorded using ambient AI listening technology and understand that they can each withdraw their consent to this listening technology at any point by asking the clinician to turn off or pause the recording:    Patient name: Beulah Menard  Additional names:  n/a    History of Present Illness  Radha Menard is a 64 year old female who presents for follow-up after brain surgery.      Radha Menard is a 64 year old female with diverticulitis who presents with lower quadrant pain and fatigue.    She has been experiencing intermittent lower quadrant pain for a couple of weeks, typically managing it with Advil. The pain intensified on Monday after Easter Sunday, accompanied by unusual fatigue and a low-grade fever of 100°F that night. She has had three diverticular flares in the past, two requiring hospitalization and CT scans. She is currently reluctant to undergo another CT scan or surgery.    On the night of the intensified pain, she experienced frequent urination, initially suspecting bladder pressure. A home UTI test was negative. She has not had a bowel movement since Tuesday despite taking Colace twice daily. Previous treatments for diverticulitis included Flagyl and Levaquin, but she experiences 'foggy brain' with Cipro after nine or ten days.    She is starting to develop a sinus infection, likely due to allergies, which she manages with steroid capsules in her saline solution. She has a history of allergy shots, but treatment for tree allergies was stopped due to potential cardiac effects. She experiences fluid in her ear and drainage, particularly on the right side, which started this morning.    She is undergoing therapy for central vestibular disorder and speech and language issues, expected to continue until August. She is restricted in driving and bike riding due to her condition but is able to use a stationary bike at home. She is preparing  to speak at a convention about her experience with a brain tumor.  .    EXAM:   LMP 10/13/2007   There is no height or weight on file to calculate BMI.   GENERAL: alert and oriented X 3, well developed, well nourished,in no apparent distress  CARDIO: RRR without murmur  LUNGS: clear to auscultation  NECK: supple,no adenopathy,no thyromegaly  HEENT: atraumatic, normocephalic,ears and throat are clear,+sinus pain  EYES:PERRLA, EOMI, normal,conjunctiva are clear  SKIN: norashes,no suspicious lesions  GI: good BS's,no masses, HSM + LLQ tenderness,  no RRG  MUSCULOSKELETAL: back is not tender,FROM of the back  EXTREMITIES: no cyanosis, clubbing or edema  NEURO: cranial nerves are intact,motor and sensory are grossly intact    ASSESSMENT AND PLAN:     Assessment & Plan      Sinusitis  Sinusitis likely secondary to allergies, with symptoms of sinus congestion and drainage exacerbated by outdoor allergens. Augmentin is considered safe despite previous antibiotic reactions.  - Prescribe Augmentin for sinusitis and potential bacterial infection.  - Continue using steroid capsules in saline solution for sinus relief.    Allergic rhinitis  Allergic rhinitis exacerbated by outdoor exposure during a severe allergy season. She manages symptoms with saline solution and steroid capsules. Allergy shots were discontinued due to potential cardiac effects.  - Continue management with saline solution and steroid capsules.  - Consider future allergy shots, currently not feasible due to cardiac concerns.    Diverticulitis  Recurrent diverticulitis with lower quadrant pain, low-grade fever, and constipation. Three previous flares, two requiring hospitalization. Current episode ongoing for weeks with intermittent pain and fever since Sunday. Prefers to avoid CT scans and surgery.  - Prescribe Augmentin and Flagyl for diverticulitis.  - Continue daily probiotics to mitigate antibiotic-induced diarrhea.  - Increase Colace to twice daily for  constipation.    Central vestibular disorder  Ongoing central vestibular disorder with therapy continuing until August. She is progressing with therapy and learning to pace activities to manage symptoms.  - Continue therapy for central vestibular disorder until August.    Speech and language disorder  Speech and language disorder related to central vestibular disorder. She is actively working on speech exercises and managing symptoms with pacing strategies.  - Continue speech therapy exercises as part of ongoing management.      1. Diverticulitis    - metroNIDAZOLE 500 MG Oral Tab; Take 1 tablet (500 mg total) by mouth 3 (three) times daily for 10 days.  Dispense: 30 tablet; Refill: 0  - amoxicillin clavulanate 875-125 MG Oral Tab; Take 1 tablet by mouth 2 (two) times daily for 10 days.  Dispense: 20 tablet; Refill: 0    2. Sinus pain    - amoxicillin clavulanate 875-125 MG Oral Tab; Take 1 tablet by mouth 2 (two) times daily for 10 days.  Dispense: 20 tablet; Refill: 0      RTC in 2-3 mo or sooner if needed.

## 2025-04-27 ENCOUNTER — PATIENT MESSAGE (OUTPATIENT)
Dept: FAMILY MEDICINE CLINIC | Facility: CLINIC | Age: 65
End: 2025-04-27

## 2025-04-30 NOTE — TELEPHONE ENCOUNTER
Patient states she is feeling better,Symptoms have improved,Sinuses and cough are clearing.Dverticulitis symptoms are now gone.  Should we add to allergy list? Please advise.

## 2025-07-07 ENCOUNTER — HOSPITAL ENCOUNTER (OUTPATIENT)
Dept: BONE DENSITY | Age: 65
Discharge: HOME | End: 2025-07-07
Attending: STUDENT IN AN ORGANIZED HEALTH CARE EDUCATION/TRAINING PROGRAM
Payer: MEDICARE

## 2025-07-07 DIAGNOSIS — Z78.0 POSTMENOPAUSAL STATE: ICD-10-CM

## 2025-07-07 DIAGNOSIS — M85.859 OSTEOPENIA OF NECK OF FEMUR, UNSPECIFIED LATERALITY: ICD-10-CM

## 2025-07-07 PROCEDURE — 77080 DXA BONE DENSITY AXIAL: CPT | Performed by: STUDENT IN AN ORGANIZED HEALTH CARE EDUCATION/TRAINING PROGRAM

## 2025-07-23 ENCOUNTER — OFFICE VISIT (OUTPATIENT)
Dept: FAMILY MEDICINE CLINIC | Facility: CLINIC | Age: 65
End: 2025-07-23
Payer: MEDICARE

## 2025-07-23 VITALS
WEIGHT: 168 LBS | HEIGHT: 66 IN | RESPIRATION RATE: 18 BRPM | SYSTOLIC BLOOD PRESSURE: 140 MMHG | BODY MASS INDEX: 27 KG/M2 | DIASTOLIC BLOOD PRESSURE: 72 MMHG | OXYGEN SATURATION: 97 % | HEART RATE: 76 BPM

## 2025-07-23 DIAGNOSIS — D32.0 MENINGIOMA, CEREBRAL (HCC): ICD-10-CM

## 2025-07-23 DIAGNOSIS — R20.2 PARESTHESIA: Primary | ICD-10-CM

## 2025-07-23 DIAGNOSIS — R53.83 OTHER FATIGUE: ICD-10-CM

## 2025-07-23 DIAGNOSIS — R56.9 SEIZURE (HCC): ICD-10-CM

## 2025-07-23 DIAGNOSIS — R06.83 SNORING: ICD-10-CM

## 2025-07-23 DIAGNOSIS — D64.9 ANEMIA, UNSPECIFIED TYPE: ICD-10-CM

## 2025-07-23 DIAGNOSIS — R47.89 WORD FINDING DIFFICULTY: ICD-10-CM

## 2025-07-23 PROCEDURE — 99214 OFFICE O/P EST MOD 30 MIN: CPT | Performed by: FAMILY MEDICINE

## 2025-07-23 NOTE — PROGRESS NOTES
The following individual(s) verbally consented to be recorded using ambient AI listening technology and understand that they can each withdraw their consent to this listening technology at any point by asking the clinician to turn off or pause the recording:    Patient name: Beulah Menard  Additional names:   present     History of Present Illness      Radha Menard is a 64 year old female with a history of craniotomy who presents with ongoing cognitive and physical deficits post-surgery.    She experiences significant fatigue, particularly after therapies, which affects her ability to drive safely. Word-finding difficulties and mixing up words occur, especially when tired or in social situations. She continues with speech therapy and has completed physical therapy, although balance issues persist, particularly when standing and looking up.    Persistent sensory deficits in her left leg include loss of sensation in her big toe, heel, and parts of her foot and shin. She has experienced falls, attributed more to vestibular and balance issues rather than the sensory deficits in her feet. She wears supportive shoes to mitigate these issues.    She is on Vimpat, taking 150 mg at night and 100 mg during the day. A recent attempt to lower the daytime dose resulted in increased visual disturbances and nocturia. She also experiences a 'wormy feeling' in her legs, particularly two hours before her next dose. Despite these symptoms, she prefers to maintain the current dosage to avoid brain fog.    She describes an event where she felt like she was falling while asleep, which she suspects might have been a partial seizure. No tonic-clonic or grand mal seizures have occurred. She is cautious about driving, preferring to drive only under optimal conditions.    Engaging in activities like gardening and exercise helps with her physical recovery and mood. No anxiety or nervousness related to her condition is  reported, although she experiences some social nervousness due to her speech difficulties. She is planning a trip to [facility] and is preparing for potential challenges related to her condition during travel.  .    EXAM:   /76   Pulse 76   Resp 18   Ht 5' 6\" (1.676 m)   Wt 168 lb (76.2 kg)   LMP 10/13/2007   SpO2 97%   BMI 27.12 kg/m²   Body mass index is 27.12 kg/m².   GENERAL: alert and oriented X 3, well developed, well nourished,in no apparent distress  NEURO: cranial nerves are intact,motor and sensory are grossly intact    ASSESSMENT AND PLAN:     Assessment & Plan      Post-craniotomy cognitive and physical deficits  She experiences ongoing cognitive and physical deficits post-craniotomy, including word-finding difficulties, sequencing issues, and balance problems. The neurologist confirmed these are typical post-surgical deficits that may take months to resolve. Speech and language therapy is ongoing, and occupational therapy is reinstated for sequencing issues. Physical therapy has been completed but may resume if balance issues persist. She reports improvement in balance and vestibular function but still has difficulty standing and looking up.  - Continue speech and language therapy  - Reinstate occupational therapy for sequencing issues  - Consider resuming physical therapy if balance issues persist    Left leg sensory deficits  She has persistent sensory deficits in the left leg, including loss of sensation in the big toe, heel, and parts of the foot, anticipated as a potential surgical outcome. She reports occasional knee instability, possibly related to fatigue rather than sensory deficits. The neurologist suggested referral to a physiatrist if symptoms persist.  - Monitor sensory deficits and knee stability  - Consult with physiatrist at [facility]    Seizure disorder  She is on Vimpat for seizure management. A recent attempt to reduce the daytime dose led to increased  symptoms, including visual disturbances and nocturia. She prefers managing mild symptoms over increasing the dose due to improved cognitive clarity. The neurologist suggested a potential switch to lamotrigine if symptoms worsen. She experienced a possible focal aware seizure during sleep, described as a falling sensation.  - Continue Vimpat 100 mg in the morning and 150 mg at night  - Monitor for worsening symptoms  - Consider switch to lamotrigine if symptoms become onerous  - Repeat EEG if symptoms persist  - Consider referral to seizure disorder clinic at [facility] if needed    Suspected sleep apnea  She experiences nocturia and occasional waking events, possibly related to sleep apnea. Untreated sleep apnea may contribute to cognitive and physical symptoms. A sleep study is recommended.  - Order in-lab sleep study to evaluate for sleep apnea    General Health Maintenance  She engages in regular physical activity, including walking and cycling, and plans a trip to [facility]. The importance of maintaining enjoyable activities and managing fatigue was discussed. Blood pressure was slightly elevated; a repeat measurement is planned.  - Encourage regular physical activity  - Support engagement in enjoyable activities  - Monitor blood pressure and repeat measurement    Follow-up  She has a follow-up appointment with a psychiatrist in August and plans to send therapy notes. She is planning a trip to [facility] with a trial run to [place].  - Follow up with psychiatrist in August  - Send therapy notes  - Plan trial trip to [place] before [place] trip    1. Paresthesia  monitor    2. Word finding difficulty  Monitor     Continue all therapist     3. Snoring    - Diagnostic Sleep Study-split night PAP implemented if criteria met  - General sleep study; Future    4. Meningioma, cerebral (HCC)  S/p surgery     5. Seizure (HCC)  Seeing neuro at [place]    6. Other fatigue    - Assay, Thyroid Stim Hormone; Future  - Free  T4, (Free Thyroxine); Future  - CBC With Differential With Platelet; Future  - Vitamin B12; Future  - Ferritin [E]; Future  - Folic Acid Serum [E]; Future    7. Anemia, unspecified type    - CBC With Differential With Platelet; Future  - Ferritin [E]; Future      RTC in 2-3 mo or sooner if needed.

## 2025-07-29 ENCOUNTER — PATIENT MESSAGE (OUTPATIENT)
Dept: FAMILY MEDICINE CLINIC | Facility: CLINIC | Age: 65
End: 2025-07-29

## 2025-08-04 ENCOUNTER — LAB ENCOUNTER (OUTPATIENT)
Dept: LAB | Age: 65
End: 2025-08-04
Attending: FAMILY MEDICINE

## 2025-08-04 DIAGNOSIS — D64.9 ANEMIA, UNSPECIFIED TYPE: ICD-10-CM

## 2025-08-04 DIAGNOSIS — R53.83 OTHER FATIGUE: ICD-10-CM

## 2025-08-04 LAB
BASOPHILS # BLD AUTO: 0.04 X10(3) UL (ref 0–0.2)
BASOPHILS NFR BLD AUTO: 0.6 %
DEPRECATED HBV CORE AB SER IA-ACNC: 52 NG/ML (ref 50–306)
EOSINOPHIL # BLD AUTO: 0.09 X10(3) UL (ref 0–0.7)
EOSINOPHIL NFR BLD AUTO: 1.4 %
ERYTHROCYTE [DISTWIDTH] IN BLOOD BY AUTOMATED COUNT: 13.8 %
FOLATE SERPL-MCNC: 20.6 NG/ML (ref 5.4–?)
HCT VFR BLD AUTO: 43.8 % (ref 35–48)
HGB BLD-MCNC: 14.5 G/DL (ref 12–16)
IMM GRANULOCYTES # BLD AUTO: 0.02 X10(3) UL (ref 0–1)
IMM GRANULOCYTES NFR BLD: 0.3 %
LYMPHOCYTES # BLD AUTO: 2.62 X10(3) UL (ref 1–4)
LYMPHOCYTES NFR BLD AUTO: 39.7 %
MCH RBC QN AUTO: 30.1 PG (ref 26–34)
MCHC RBC AUTO-ENTMCNC: 33.1 G/DL (ref 31–37)
MCV RBC AUTO: 90.9 FL (ref 80–100)
MONOCYTES # BLD AUTO: 0.72 X10(3) UL (ref 0.1–1)
MONOCYTES NFR BLD AUTO: 10.9 %
NEUTROPHILS # BLD AUTO: 3.11 X10 (3) UL (ref 1.5–7.7)
NEUTROPHILS # BLD AUTO: 3.11 X10(3) UL (ref 1.5–7.7)
NEUTROPHILS NFR BLD AUTO: 47.1 %
PLATELET # BLD AUTO: 312 10(3)UL (ref 150–450)
RBC # BLD AUTO: 4.82 X10(6)UL (ref 3.8–5.3)
T4 FREE SERPL-MCNC: 1.3 NG/DL (ref 0.8–1.7)
TSI SER-ACNC: 0.8 UIU/ML (ref 0.55–4.78)
VIT B12 SERPL-MCNC: 999 PG/ML (ref 211–911)
WBC # BLD AUTO: 6.6 X10(3) UL (ref 4–11)

## 2025-08-04 PROCEDURE — 85025 COMPLETE CBC W/AUTO DIFF WBC: CPT

## 2025-08-04 PROCEDURE — 36415 COLL VENOUS BLD VENIPUNCTURE: CPT

## 2025-08-04 PROCEDURE — 82607 VITAMIN B-12: CPT

## 2025-08-04 PROCEDURE — 82728 ASSAY OF FERRITIN: CPT

## 2025-08-04 PROCEDURE — 84443 ASSAY THYROID STIM HORMONE: CPT

## 2025-08-04 PROCEDURE — 84439 ASSAY OF FREE THYROXINE: CPT

## 2025-08-04 PROCEDURE — 82746 ASSAY OF FOLIC ACID SERUM: CPT

## (undated) DEVICE — KIT CUSTOM ENDOPROCEDURE STERIS

## (undated) DEVICE — KIT MFLD FOR SPEC COLL

## (undated) DEVICE — 10FT COMBINED O2 DELIVERY/CO2 MONITORING. FILTER WITH MICROSTREAM TYPE LUER: Brand: DUAL ADULT NASAL CANNULA

## (undated) DEVICE — 1200CC GUARDIAN II: Brand: GUARDIAN

## (undated) DEVICE — KIT VLV 5 PC AIR H2O SUCT BX ENDOGATOR CONN

## (undated) DEVICE — 3M™ RED DOT™ MONITORING ELECTRODE WITH FOAM TAPE AND STICKY GEL, 50/BAG, 20/CASE, 72/PLT 2570: Brand: RED DOT™

## (undated) NOTE — LETTER
5/13/2019  [address]  [facility] [phone]    Dear Tyrese Lacey,       Here are the results from your recent testing : The upper GI xray was normal other than a small hiatal hernia.     If you need any further assistance, please f

## (undated) NOTE — LETTER
11/24/20        [phone]      Dear Kofi Lew,    [facility] indicate that you have outstanding lab work and or testing that was ordered for you and has not yet been completed:  Orders Placed This Encou

## (undated) NOTE — LETTER
23    Patient: Nestor Reina  : 10/13/1960 Visit date: 2023    Dear  Earle Casiano DO    Thank you for referring Nestor Reina to my practice. Please find my assessment and plan below. Assessment   Grade 3 internal hemorrhoids  External prolapsed hemorrhoids      Plan   The patient presents in consultation of Dr. Aba Pearl and Dr. Adrien Ochoa for evaluation of her hemorrhoids. The patient states she has had hemorrhoids for many years. She has had intermittent symptoms over the last 13 years, but her symptoms have progressively worsened. She states her primary symptom is \"uncontrollable\" rectal bleeding. She states currently approximately 4 times per week, after a bowel movement, she has significant amounts of bright red blood per rectum. She states the bleeding soaks through a feminine pad and then through her close. She states she attempts to combat the bleeding with pressure, but experiences no relief. The patient has lesser amounts of bleeding on the remainder of the days of the week. The patient states she thinks she has both internal and external hemorrhoids. They prolapse with bowel movements and reduce spontaneously. She has had rubber band treatments for her hemorrhoids in the past.  She has seen both Dr. Timmy Muller and Dr. Kathie Resendiz for rubber band ligation of her hemorrhoids. She states she thinks she has had a total of 15 banding's in the past.    The patient denies any rectal pain. She does have intermittent diarrhea and constipation. She feels obstructed when defecating. She does have some narrowing of her stool. She denies black/tarry stools or mucus in her stool. Preparation H wipes provide some relief of her symptoms, but do not entirely resolve her symptoms. Additionally, the patient has a history of diverticulitis. She states she has had 3 lifetime attacks.   She states her pain starts as left lower quadrant abdominal pain that progresses to generalized pain.  Her first attack was in 2020, followed by an attack in 2021, with her most recent attack being approximately 1 year ago. She knows that surgery is recommended, but prefers to postpone having surgery for her diverticulitis at this time. The patient follows with Dr. Anna Talbot for her colonoscopies. She states her most recent colonoscopy was in . She is unsure whether she has had polyps in the past.    The patient has a past medical history significant for adult asthma. She denies a personal history of IBS, Crohn's disease or ulcerative colitis. She does not take any blood thinners. The patient had 2 sets of twins. She carried both pregnancies to term, but had  sections. She has no other surgical history. The patient denies any first or second-degree relatives with history of colon cancer/colon polyps. The patient denies any first or second-degree relatives with a history of uterine cancer. Clinical exam of the rectum including anoscopy reveals no fissures, fistulas or abscesses. She does have external prolapse, internal grade 3 hemorrhoids in the right anterior, right posterior and left lateral locations. Anoscopy reveals no evidence of proctitis or Crohn's disease. Digital rectal exam reveals a large rectocele. There are no palpable masses. Basal tone 3/4 and maximum squeeze pressure 3/4. I recommend the patient undergo rubber band ligation of her internal hemorrhoids. I explained to the patient that she will require 4 rubber band treatments at least 2 weeks apart each. All risks, benefits, complications and alternatives to the proposed procedure(s) were fully discussed with the patient. All questions from the patient were answered in detail. A description of the procedure(s) possible outcomes was fully discussed. The patient seemed to understand the conversation and its details.     Consent for the procedure(s) was confirmed with the patient.       Sincerely,       Abhilash Mcrae MD   CC:   No Recipients

## (undated) NOTE — LETTER
Date: 10/11/2019    Patient Name: Heather Núñez          To Whom it may concern:     This letter has been written at the patient's request. The above patient was seen at the [facility] for treatment of a medical condition.        (M50.20

## (undated) NOTE — LETTER
Date: 3/6/2020    Patient Name: Mary Peterson          To Whom it may concern: This letter has been written at the patient's request. The above patient was seen at the [facility] for treatment of a medical condition.     Ariadne Stokes has

## (undated) NOTE — LETTER
05/15/23    Patient: China Hyatt  : 10/13/1960 Visit date: 5/15/2023    Dear  Jevon Raymundo DO    Thank you for referring China Hyatt to my practice. Please find my assessment and plan below. Assessment   Prolapsed internal hemorrhoids, grade 3  (primary encounter diagnosis)    Plan   At today's office visit we treated right he-lateral internal hemorrhoid. At today's visit, the patient underwent an uncomplicated application of a rubber band and injection of a 5% phenol solution into the base of the rubberbanded hemorrhoid. The patient tolerated the procedure well. The patient remained stable throughout the entire procedure within my office. The patient was asked to recover in my office for a few minutes prior to leaving for home. The patient should refrain from extreme sports or athletic activity, including heavy lifting. We will see this patient again in 2-3 weeks for further care and treatment.          Sincerely,       Michael Villalobos MD   CC:   No Recipients

## (undated) NOTE — LETTER
01/28/22        [place] Marcano  [facility] [phone]      Dear Jammie Celestin,    [facility] records indicate that you have outstanding lab work and or testing that was ordered for you and has not yet been completed:  No orders of the defined

## (undated) NOTE — LETTER
10/27/18        [facility] Luisa Mary  [facility] [phone]      Dear Kenyatta Mistry,    [facility] records indicate that you have outstanding lab work and or testing that was ordered for you and has not yet been completed:  Orders Placed This Encou

## (undated) NOTE — LETTER
23    Patient: Sonja Castro  : 10/13/1960 Visit date: 2023    Dear  Yamil Casey,     Thank you for referring Sonja Castro to my practice. Please find my assessment and plan below. Assessment   Diverticulitis  (primary encounter diagnosis)  Prolapsed internal hemorrhoids, grade 3    Plan   This patient had significant medical issues after her last rubber band application. Her last rubber band was a month ago. She states that few weeks after the rubber band she began with obstructive defecation and dysuria. She states that she called her gastroenterologist office and was told she had \"diverticulitis\" which is common for her. She has slight fever to 100.0. She felt constipated. She had trouble urinating. She had some anal and rectal pain. She is now normal.  She states that just a few days ago she got back to normal bowel habits. She at one point had the fever and diarrhea, then went to constipation. She did see intermittent bright red blood per rectum, but does not describe any beyond the normal amount after rubber band therapy. She does desire rubber band treatment today. Clinical exam including anoscopy reveals her to have no complications at the first rubber band site. That rubber band is gone. The mucosa is well-healed. There is no evidence of ruptured of an abscess or current fistula tract. Is not indurated. Is completely smooth. There is no external thrombosed hemorrhoid or remnant. The remaining hemorrhoids are in the left lateral right posterolateral positions. There is still grade 3. There is no proctitis or Crohn's disease. There is no fissure or fistula. I took the opportunity today to rubber band and inject the left lateral hemorrhoid. The injection was a 5% phenol solution. Patient tolerated procedure well. I counseled her that rubber banding should not precipitate diverticulitis.   Rubber banding should not precipitate a fever unless there is an accompanying mass, infection, abscess, or cellulitis. Normally that would not resolve without antibiotics. Her gastroenterologist treated this \"diverticulitis\" with dicyclomine and Advil. She states she got good relief with that after a few days. She did not get any CAT scan or other testing during her treatment of the diverticulitis. This would be her third episode of diverticulitis. She has a confusing pattern of possibly 2 different illnesses coincidentally occurring at the same time. At this point I told her I will be in town. She should report to me urgently for any problems or complications. If she gets trouble on the weekend I want her to proceed to the [facility] emergency department and have an anal exam.    If she feels she has diverticulitis again, she should undergo CT scan of the abdomen pelvis if the diverticulitis is acute enough to warrant therapy and treatment. We will proceed to complete her rubber band therapy as she has quite large hemorrhoids that are symptomatic and internal grade 3 in size.        Sincerely,       Geetha Hernandez MD   CC:   No Recipients

## (undated) NOTE — LETTER
ASTHMA ACTION PLAN for Ayaan Daniels     : 10/13/1960     Date: 2017  Provider:  Bennett Garces DO  Phone for doctor or clinic: [facility], [address], [address] [place] [address]  [phone]

## (undated) NOTE — LETTER
23    Patient: Dagmar Steele  : 10/13/1960 Visit date: 2023    Dear  Swapna Campuzano,     Thank you for referring Dagmar Steele to my practice. Please find my assessment and plan below. Assessment   Prolapsed internal hemorrhoids, grade 3  (primary encounter diagnosis)  External prolapsed hemorrhoids    Plan   At today's office visit we treated a right postero-lateral internal hemorrhoid. At today's visit, the patient underwent an uncomplicated application of a rubber band and injection of a 5% phenol solution into the base of the rubberbanded hemorrhoid. The patient tolerated the procedure well. The patient remained stable throughout the entire procedure within my office. The patient was asked to recover in my office for a few minutes prior to leaving for home. The patient should refrain from extreme sports or athletic activity, including heavy lifting. We will see this patient again in 2-3 weeks for further care and treatment.          Sincerely,       Jamilah Tan MD   CC:   No Recipients